# Patient Record
Sex: MALE | Race: BLACK OR AFRICAN AMERICAN | NOT HISPANIC OR LATINO
[De-identification: names, ages, dates, MRNs, and addresses within clinical notes are randomized per-mention and may not be internally consistent; named-entity substitution may affect disease eponyms.]

---

## 2017-04-06 ENCOUNTER — APPOINTMENT (OUTPATIENT)
Dept: ELECTROPHYSIOLOGY | Facility: CLINIC | Age: 47
End: 2017-04-06

## 2017-04-06 VITALS
WEIGHT: 315 LBS | DIASTOLIC BLOOD PRESSURE: 109 MMHG | HEIGHT: 67 IN | BODY MASS INDEX: 49.44 KG/M2 | HEART RATE: 62 BPM | SYSTOLIC BLOOD PRESSURE: 146 MMHG

## 2017-04-06 DIAGNOSIS — G45.9 TRANSIENT CEREBRAL ISCHEMIC ATTACK, UNSPECIFIED: ICD-10-CM

## 2017-04-06 DIAGNOSIS — I10 ESSENTIAL (PRIMARY) HYPERTENSION: ICD-10-CM

## 2017-04-06 DIAGNOSIS — G47.33 OBSTRUCTIVE SLEEP APNEA (ADULT) (PEDIATRIC): ICD-10-CM

## 2017-07-07 ENCOUNTER — APPOINTMENT (OUTPATIENT)
Dept: ELECTROPHYSIOLOGY | Facility: CLINIC | Age: 47
End: 2017-07-07

## 2017-07-07 VITALS
HEIGHT: 67 IN | BODY MASS INDEX: 49.44 KG/M2 | WEIGHT: 315 LBS | SYSTOLIC BLOOD PRESSURE: 153 MMHG | DIASTOLIC BLOOD PRESSURE: 112 MMHG | HEART RATE: 66 BPM

## 2017-07-07 DIAGNOSIS — Z86.79 PERSONAL HISTORY OF OTHER DISEASES OF THE CIRCULATORY SYSTEM: ICD-10-CM

## 2017-11-03 ENCOUNTER — APPOINTMENT (OUTPATIENT)
Dept: ELECTROPHYSIOLOGY | Facility: CLINIC | Age: 47
End: 2017-11-03
Payer: MEDICARE

## 2017-11-03 PROCEDURE — 93283 PRGRMG EVAL IMPLANTABLE DFB: CPT

## 2018-02-15 ENCOUNTER — APPOINTMENT (OUTPATIENT)
Dept: ELECTROPHYSIOLOGY | Facility: CLINIC | Age: 48
End: 2018-02-15
Payer: MEDICARE

## 2018-02-15 VITALS
BODY MASS INDEX: 49.44 KG/M2 | HEART RATE: 64 BPM | WEIGHT: 315 LBS | SYSTOLIC BLOOD PRESSURE: 112 MMHG | HEIGHT: 67 IN | DIASTOLIC BLOOD PRESSURE: 82 MMHG | OXYGEN SATURATION: 99 %

## 2018-02-15 PROCEDURE — 93283 PRGRMG EVAL IMPLANTABLE DFB: CPT

## 2018-05-22 ENCOUNTER — APPOINTMENT (OUTPATIENT)
Dept: ELECTROPHYSIOLOGY | Facility: CLINIC | Age: 48
End: 2018-05-22
Payer: MEDICARE

## 2018-05-22 PROCEDURE — 93283 PRGRMG EVAL IMPLANTABLE DFB: CPT

## 2018-07-05 ENCOUNTER — APPOINTMENT (OUTPATIENT)
Dept: ELECTROPHYSIOLOGY | Facility: CLINIC | Age: 48
End: 2018-07-05
Payer: MEDICARE

## 2018-07-05 VITALS
HEIGHT: 67 IN | HEART RATE: 70 BPM | BODY MASS INDEX: 49.44 KG/M2 | DIASTOLIC BLOOD PRESSURE: 85 MMHG | WEIGHT: 315 LBS | SYSTOLIC BLOOD PRESSURE: 125 MMHG

## 2018-07-05 PROCEDURE — 93283 PRGRMG EVAL IMPLANTABLE DFB: CPT

## 2018-07-05 PROCEDURE — 93290 INTERROG DEV EVAL ICPMS IP: CPT | Mod: 26

## 2018-07-05 RX ORDER — LOSARTAN POTASSIUM 100 MG/1
100 TABLET, FILM COATED ORAL
Refills: 0 | Status: DISCONTINUED | COMMUNITY
End: 2018-07-05

## 2018-08-16 ENCOUNTER — APPOINTMENT (OUTPATIENT)
Dept: ELECTROPHYSIOLOGY | Facility: CLINIC | Age: 48
End: 2018-08-16
Payer: MEDICARE

## 2018-08-16 VITALS
WEIGHT: 315 LBS | SYSTOLIC BLOOD PRESSURE: 124 MMHG | DIASTOLIC BLOOD PRESSURE: 78 MMHG | HEIGHT: 67 IN | BODY MASS INDEX: 49.44 KG/M2 | HEART RATE: 73 BPM

## 2018-08-16 PROCEDURE — 93290 INTERROG DEV EVAL ICPMS IP: CPT | Mod: 26

## 2018-08-16 PROCEDURE — 93283 PRGRMG EVAL IMPLANTABLE DFB: CPT

## 2018-08-20 PROBLEM — G45.9 TRANSIENT ISCHEMIC ATTACK: Status: ACTIVE | Noted: 2017-04-06

## 2018-09-27 ENCOUNTER — APPOINTMENT (OUTPATIENT)
Dept: ELECTROPHYSIOLOGY | Facility: CLINIC | Age: 48
End: 2018-09-27
Payer: MEDICARE

## 2018-09-27 VITALS — DIASTOLIC BLOOD PRESSURE: 101 MMHG | HEIGHT: 67 IN | HEART RATE: 63 BPM | SYSTOLIC BLOOD PRESSURE: 136 MMHG

## 2018-09-27 PROCEDURE — 93283 PRGRMG EVAL IMPLANTABLE DFB: CPT

## 2018-10-31 ENCOUNTER — APPOINTMENT (OUTPATIENT)
Dept: ELECTROPHYSIOLOGY | Facility: CLINIC | Age: 48
End: 2018-10-31
Payer: MEDICARE

## 2018-10-31 VITALS
WEIGHT: 315 LBS | BODY MASS INDEX: 49.44 KG/M2 | DIASTOLIC BLOOD PRESSURE: 83 MMHG | HEIGHT: 67 IN | SYSTOLIC BLOOD PRESSURE: 123 MMHG | HEART RATE: 76 BPM

## 2018-10-31 PROCEDURE — 93283 PRGRMG EVAL IMPLANTABLE DFB: CPT

## 2018-11-19 ENCOUNTER — OUTPATIENT (OUTPATIENT)
Dept: OUTPATIENT SERVICES | Facility: HOSPITAL | Age: 48
LOS: 1 days | Discharge: HOME | End: 2018-11-19
Payer: MEDICARE

## 2018-11-19 VITALS
SYSTOLIC BLOOD PRESSURE: 125 MMHG | TEMPERATURE: 98 F | HEART RATE: 57 BPM | DIASTOLIC BLOOD PRESSURE: 86 MMHG | OXYGEN SATURATION: 96 % | HEIGHT: 67 IN | WEIGHT: 315 LBS | RESPIRATION RATE: 18 BRPM

## 2018-11-19 DIAGNOSIS — Z98.890 OTHER SPECIFIED POSTPROCEDURAL STATES: Chronic | ICD-10-CM

## 2018-11-19 DIAGNOSIS — Z01.818 ENCOUNTER FOR OTHER PREPROCEDURAL EXAMINATION: ICD-10-CM

## 2018-11-19 DIAGNOSIS — I47.2 VENTRICULAR TACHYCARDIA: ICD-10-CM

## 2018-11-19 DIAGNOSIS — Z95.810 PRESENCE OF AUTOMATIC (IMPLANTABLE) CARDIAC DEFIBRILLATOR: Chronic | ICD-10-CM

## 2018-11-19 LAB
ANION GAP SERPL CALC-SCNC: 9 MMOL/L — SIGNIFICANT CHANGE UP (ref 5–17)
BASOPHILS # BLD AUTO: 0.04 K/UL — SIGNIFICANT CHANGE UP (ref 0–0.2)
BASOPHILS NFR BLD AUTO: 0.5 % — SIGNIFICANT CHANGE UP (ref 0–2)
BLD GP AB SCN SERPL QL: SIGNIFICANT CHANGE UP
BUN SERPL-MCNC: 16 MG/DL — SIGNIFICANT CHANGE UP (ref 7–23)
CALCIUM SERPL-MCNC: 8.4 MG/DL — LOW (ref 8.5–10.1)
CHLORIDE SERPL-SCNC: 106 MMOL/L — SIGNIFICANT CHANGE UP (ref 96–108)
CO2 SERPL-SCNC: 25 MMOL/L — SIGNIFICANT CHANGE UP (ref 22–31)
CREAT SERPL-MCNC: 1.09 MG/DL — SIGNIFICANT CHANGE UP (ref 0.5–1.3)
EOSINOPHIL # BLD AUTO: 0.13 K/UL — SIGNIFICANT CHANGE UP (ref 0–0.5)
EOSINOPHIL NFR BLD AUTO: 1.5 % — SIGNIFICANT CHANGE UP (ref 0–6)
GLUCOSE SERPL-MCNC: 148 MG/DL — HIGH (ref 70–99)
HCT VFR BLD CALC: 44.6 % — SIGNIFICANT CHANGE UP (ref 39–50)
HGB BLD-MCNC: 14.6 G/DL — SIGNIFICANT CHANGE UP (ref 13–17)
IMM GRANULOCYTES NFR BLD AUTO: 0.4 % — SIGNIFICANT CHANGE UP (ref 0–1.5)
LYMPHOCYTES # BLD AUTO: 3.46 K/UL — HIGH (ref 1–3.3)
LYMPHOCYTES # BLD AUTO: 40.4 % — SIGNIFICANT CHANGE UP (ref 13–44)
MCHC RBC-ENTMCNC: 27.9 PG — SIGNIFICANT CHANGE UP (ref 27–34)
MCHC RBC-ENTMCNC: 32.7 GM/DL — SIGNIFICANT CHANGE UP (ref 32–36)
MCV RBC AUTO: 85.1 FL — SIGNIFICANT CHANGE UP (ref 80–100)
MONOCYTES # BLD AUTO: 0.76 K/UL — SIGNIFICANT CHANGE UP (ref 0–0.9)
MONOCYTES NFR BLD AUTO: 8.9 % — SIGNIFICANT CHANGE UP (ref 2–14)
MRSA PCR RESULT.: SIGNIFICANT CHANGE UP
NEUTROPHILS # BLD AUTO: 4.14 K/UL — SIGNIFICANT CHANGE UP (ref 1.8–7.4)
NEUTROPHILS NFR BLD AUTO: 48.3 % — SIGNIFICANT CHANGE UP (ref 43–77)
NRBC # BLD: 0 /100 WBCS — SIGNIFICANT CHANGE UP (ref 0–0)
PLATELET # BLD AUTO: 247 K/UL — SIGNIFICANT CHANGE UP (ref 150–400)
POTASSIUM SERPL-MCNC: 3.3 MMOL/L — LOW (ref 3.5–5.3)
POTASSIUM SERPL-SCNC: 3.3 MMOL/L — LOW (ref 3.5–5.3)
RBC # BLD: 5.24 M/UL — SIGNIFICANT CHANGE UP (ref 4.2–5.8)
RBC # FLD: 14.9 % — HIGH (ref 10.3–14.5)
S AUREUS DNA NOSE QL NAA+PROBE: SIGNIFICANT CHANGE UP
SODIUM SERPL-SCNC: 140 MMOL/L — SIGNIFICANT CHANGE UP (ref 135–145)
TYPE + AB SCN PNL BLD: SIGNIFICANT CHANGE UP
WBC # BLD: 8.56 K/UL — SIGNIFICANT CHANGE UP (ref 3.8–10.5)
WBC # FLD AUTO: 8.56 K/UL — SIGNIFICANT CHANGE UP (ref 3.8–10.5)

## 2018-11-19 PROCEDURE — 93010 ELECTROCARDIOGRAM REPORT: CPT

## 2018-11-19 PROCEDURE — 71046 X-RAY EXAM CHEST 2 VIEWS: CPT | Mod: 26

## 2018-11-19 NOTE — H&P PST ADULT - PMH
AICD (automatic cardioverter/defibrillator) present    ASD (atrial septal defect)  history of . repaired as a child  Chronic systolic congestive heart failure    Hyperlipidemia, unspecified hyperlipidemia type    Hypertension, unspecified type    Morbid obesity    Obstructive sleep apnea syndrome  No sleep device  Paroxysmal atrial fibrillation    Type 2 diabetes mellitus without complication, without long-term current use of insulin    Ventricular tachycardia

## 2018-11-19 NOTE — H&P PST ADULT - ASSESSMENT
48 years old male present to PST prior to ICD generator change with Dr. Connors.  Plan   - Instructions as per procedural doctor and cardiac nurse practitioner.  - Mupirocin as directed.  - Chlorhexidine as directed.

## 2018-11-19 NOTE — H&P PST ADULT - TEACHING/LEARNING LEARNING PREFERENCES
video/written material/pictorial/skill demonstration/audio/computer/internet/group instruction/individual instruction/verbal instruction

## 2018-11-19 NOTE — H&P PST ADULT - HISTORY OF PRESENT ILLNESS
48 years old male with a history of ventricular tachycardia. He had a defibrillator placed to left anterior chest wall 5/2011. Device interrogated every 3 months. Recently interrogated every 6 weeks as "battery life ending." Devices alarms daily at 9:50 am. Planned ICD generator change.

## 2018-11-19 NOTE — H&P PST ADULT - PSH
AICD (automatic cardioverter/defibrillator) present  5/2011  H/O heart surgery  ASD repair at 3 years old

## 2018-11-21 NOTE — ASU PATIENT PROFILE, ADULT - MEDICATIONS TO HOLD
As per NP instructions, pt holding xarelto for 2 days prior to procedure and holding furosemide a.m. of procedure. As per NP instructions, pt holding xarelto for 2 days prior to procedure and holding furosemide and metformin in a.m. of procedure.

## 2018-11-27 ENCOUNTER — OUTPATIENT (OUTPATIENT)
Dept: OUTPATIENT SERVICES | Facility: HOSPITAL | Age: 48
LOS: 1 days | Discharge: HOME | End: 2018-11-27
Payer: MEDICARE

## 2018-11-27 VITALS
RESPIRATION RATE: 20 BRPM | HEART RATE: 70 BPM | DIASTOLIC BLOOD PRESSURE: 68 MMHG | SYSTOLIC BLOOD PRESSURE: 118 MMHG | OXYGEN SATURATION: 100 %

## 2018-11-27 VITALS
WEIGHT: 315 LBS | RESPIRATION RATE: 20 BRPM | SYSTOLIC BLOOD PRESSURE: 118 MMHG | DIASTOLIC BLOOD PRESSURE: 89 MMHG | OXYGEN SATURATION: 100 % | HEIGHT: 67 IN | HEART RATE: 68 BPM | TEMPERATURE: 97 F

## 2018-11-27 DIAGNOSIS — Z98.890 OTHER SPECIFIED POSTPROCEDURAL STATES: Chronic | ICD-10-CM

## 2018-11-27 DIAGNOSIS — Z95.810 PRESENCE OF AUTOMATIC (IMPLANTABLE) CARDIAC DEFIBRILLATOR: Chronic | ICD-10-CM

## 2018-11-27 PROBLEM — G47.33 OBSTRUCTIVE SLEEP APNEA (ADULT) (PEDIATRIC): Chronic | Status: ACTIVE | Noted: 2018-11-19

## 2018-11-27 PROBLEM — Q21.1 ATRIAL SEPTAL DEFECT: Chronic | Status: ACTIVE | Noted: 2018-11-19

## 2018-11-27 PROBLEM — E66.01 MORBID (SEVERE) OBESITY DUE TO EXCESS CALORIES: Chronic | Status: ACTIVE | Noted: 2018-11-19

## 2018-11-27 PROBLEM — I47.2 VENTRICULAR TACHYCARDIA: Chronic | Status: ACTIVE | Noted: 2018-11-19

## 2018-11-27 PROBLEM — I10 ESSENTIAL (PRIMARY) HYPERTENSION: Chronic | Status: ACTIVE | Noted: 2018-11-19

## 2018-11-27 PROBLEM — I48.0 PAROXYSMAL ATRIAL FIBRILLATION: Chronic | Status: ACTIVE | Noted: 2018-11-19

## 2018-11-27 PROBLEM — E78.5 HYPERLIPIDEMIA, UNSPECIFIED: Chronic | Status: ACTIVE | Noted: 2018-11-19

## 2018-11-27 PROBLEM — E11.9 TYPE 2 DIABETES MELLITUS WITHOUT COMPLICATIONS: Chronic | Status: ACTIVE | Noted: 2018-11-19

## 2018-11-27 PROBLEM — I50.22 CHRONIC SYSTOLIC (CONGESTIVE) HEART FAILURE: Chronic | Status: ACTIVE | Noted: 2018-11-19

## 2018-11-27 LAB
ANION GAP SERPL CALC-SCNC: 7 MMOL/L — SIGNIFICANT CHANGE UP (ref 5–17)
BUN SERPL-MCNC: 15 MG/DL — SIGNIFICANT CHANGE UP (ref 7–23)
CALCIUM SERPL-MCNC: 8.3 MG/DL — LOW (ref 8.5–10.1)
CHLORIDE SERPL-SCNC: 108 MMOL/L — SIGNIFICANT CHANGE UP (ref 96–108)
CO2 SERPL-SCNC: 27 MMOL/L — SIGNIFICANT CHANGE UP (ref 22–31)
CREAT SERPL-MCNC: 1.13 MG/DL — SIGNIFICANT CHANGE UP (ref 0.5–1.3)
GLUCOSE SERPL-MCNC: 139 MG/DL — HIGH (ref 70–99)
POTASSIUM SERPL-MCNC: 3.3 MMOL/L — LOW (ref 3.5–5.3)
POTASSIUM SERPL-SCNC: 3.3 MMOL/L — LOW (ref 3.5–5.3)
SODIUM SERPL-SCNC: 142 MMOL/L — SIGNIFICANT CHANGE UP (ref 135–145)

## 2018-11-27 PROCEDURE — 33263 RMVL & RPLCMT DFB GEN 2 LEAD: CPT

## 2018-11-27 PROCEDURE — 93010 ELECTROCARDIOGRAM REPORT: CPT

## 2018-11-27 RX ORDER — METFORMIN HYDROCHLORIDE 500 MG/1
500 TABLET, COATED ORAL
Refills: 0 | Status: ACTIVE | COMMUNITY

## 2018-11-27 RX ORDER — CEPHALEXIN 500 MG
500 CAPSULE ORAL
Qty: 0 | Refills: 0 | Status: DISCONTINUED | OUTPATIENT
Start: 2018-11-27 | End: 2018-12-12

## 2018-11-27 RX ORDER — CEFAZOLIN SODIUM 1 G
2000 VIAL (EA) INJECTION ONCE
Qty: 0 | Refills: 0 | Status: COMPLETED | OUTPATIENT
Start: 2018-11-27 | End: 2018-11-27

## 2018-11-27 RX ORDER — SACUBITRIL AND VALSARTAN 49; 51 MG/1; MG/1
49-51 TABLET, FILM COATED ORAL
Refills: 0 | Status: ACTIVE | COMMUNITY

## 2018-11-27 RX ADMIN — Medication 100 MILLIGRAM(S): at 11:35

## 2018-11-27 NOTE — PACU DISCHARGE NOTE - COMMENTS
verbal and written discharge instructions given to pt with pt verbalizing understanding; home monitor given to pt with instructions given to pt and device paired with monitor by [employer] rep.

## 2018-11-28 ENCOUNTER — APPOINTMENT (OUTPATIENT)
Dept: ELECTROPHYSIOLOGY | Facility: CLINIC | Age: 48
End: 2018-11-28
Payer: MEDICARE

## 2018-11-28 VITALS — BODY MASS INDEX: 49.44 KG/M2 | WEIGHT: 315 LBS | HEART RATE: 71 BPM | HEIGHT: 67 IN

## 2018-11-28 VITALS — DIASTOLIC BLOOD PRESSURE: 75 MMHG | SYSTOLIC BLOOD PRESSURE: 122 MMHG

## 2018-11-28 PROCEDURE — 99024 POSTOP FOLLOW-UP VISIT: CPT

## 2018-12-01 DIAGNOSIS — I49.5 SICK SINUS SYNDROME: ICD-10-CM

## 2018-12-01 DIAGNOSIS — Z45.02 ENCOUNTER FOR ADJUSTMENT AND MANAGEMENT OF AUTOMATIC IMPLANTABLE CARDIAC DEFIBRILLATOR: ICD-10-CM

## 2018-12-01 DIAGNOSIS — I50.22 CHRONIC SYSTOLIC (CONGESTIVE) HEART FAILURE: ICD-10-CM

## 2018-12-01 DIAGNOSIS — I47.2 VENTRICULAR TACHYCARDIA: ICD-10-CM

## 2018-12-01 DIAGNOSIS — I11.0 HYPERTENSIVE HEART DISEASE WITH HEART FAILURE: ICD-10-CM

## 2018-12-01 DIAGNOSIS — E78.5 HYPERLIPIDEMIA, UNSPECIFIED: ICD-10-CM

## 2018-12-01 DIAGNOSIS — Z87.74 PERSONAL HISTORY OF (CORRECTED) CONGENITAL MALFORMATIONS OF HEART AND CIRCULATORY SYSTEM: ICD-10-CM

## 2018-12-01 DIAGNOSIS — Z79.01 LONG TERM (CURRENT) USE OF ANTICOAGULANTS: ICD-10-CM

## 2018-12-01 DIAGNOSIS — G47.33 OBSTRUCTIVE SLEEP APNEA (ADULT) (PEDIATRIC): ICD-10-CM

## 2018-12-01 DIAGNOSIS — E11.9 TYPE 2 DIABETES MELLITUS WITHOUT COMPLICATIONS: ICD-10-CM

## 2018-12-01 DIAGNOSIS — E66.01 MORBID (SEVERE) OBESITY DUE TO EXCESS CALORIES: ICD-10-CM

## 2018-12-01 DIAGNOSIS — Z79.84 LONG TERM (CURRENT) USE OF ORAL HYPOGLYCEMIC DRUGS: ICD-10-CM

## 2018-12-01 DIAGNOSIS — I48.0 PAROXYSMAL ATRIAL FIBRILLATION: ICD-10-CM

## 2018-12-10 ENCOUNTER — APPOINTMENT (OUTPATIENT)
Dept: ELECTROPHYSIOLOGY | Facility: CLINIC | Age: 48
End: 2018-12-10
Payer: MEDICARE

## 2018-12-10 VITALS
HEART RATE: 69 BPM | HEIGHT: 67 IN | DIASTOLIC BLOOD PRESSURE: 76 MMHG | SYSTOLIC BLOOD PRESSURE: 110 MMHG | BODY MASS INDEX: 49.44 KG/M2 | WEIGHT: 315 LBS

## 2018-12-10 PROCEDURE — 99024 POSTOP FOLLOW-UP VISIT: CPT

## 2019-01-11 ENCOUNTER — APPOINTMENT (OUTPATIENT)
Dept: ELECTROPHYSIOLOGY | Facility: CLINIC | Age: 49
End: 2019-01-11
Payer: MEDICARE

## 2019-01-11 PROCEDURE — 93283 PRGRMG EVAL IMPLANTABLE DFB: CPT

## 2019-01-11 PROCEDURE — 93290 INTERROG DEV EVAL ICPMS IP: CPT | Mod: 26

## 2019-03-12 ENCOUNTER — APPOINTMENT (OUTPATIENT)
Dept: ELECTROPHYSIOLOGY | Facility: CLINIC | Age: 49
End: 2019-03-12
Payer: MEDICARE

## 2019-03-12 VITALS
HEIGHT: 67 IN | WEIGHT: 315 LBS | SYSTOLIC BLOOD PRESSURE: 128 MMHG | DIASTOLIC BLOOD PRESSURE: 87 MMHG | BODY MASS INDEX: 49.44 KG/M2 | HEART RATE: 42 BPM

## 2019-03-12 PROCEDURE — 93290 INTERROG DEV EVAL ICPMS IP: CPT | Mod: 26

## 2019-03-12 PROCEDURE — 93283 PRGRMG EVAL IMPLANTABLE DFB: CPT

## 2019-03-27 ENCOUNTER — INPATIENT (INPATIENT)
Facility: HOSPITAL | Age: 49
LOS: 1 days | Discharge: HOME | End: 2019-03-29
Attending: INTERNAL MEDICINE | Admitting: INTERNAL MEDICINE
Payer: MEDICARE

## 2019-03-27 VITALS — OXYGEN SATURATION: 87 % | HEART RATE: 66 BPM | WEIGHT: 315 LBS | HEIGHT: 67 IN | RESPIRATION RATE: 20 BRPM

## 2019-03-27 DIAGNOSIS — Z98.890 OTHER SPECIFIED POSTPROCEDURAL STATES: Chronic | ICD-10-CM

## 2019-03-27 DIAGNOSIS — Z95.810 PRESENCE OF AUTOMATIC (IMPLANTABLE) CARDIAC DEFIBRILLATOR: Chronic | ICD-10-CM

## 2019-03-27 LAB
ALBUMIN SERPL ELPH-MCNC: 3.5 G/DL — SIGNIFICANT CHANGE UP (ref 3.3–5)
ALP SERPL-CCNC: 49 U/L — SIGNIFICANT CHANGE UP (ref 40–120)
ALT FLD-CCNC: 47 U/L — SIGNIFICANT CHANGE UP (ref 12–78)
ANION GAP SERPL CALC-SCNC: 7 MMOL/L — SIGNIFICANT CHANGE UP (ref 5–17)
APPEARANCE UR: CLEAR — SIGNIFICANT CHANGE UP
APTT BLD: 44.2 SEC — HIGH (ref 27.5–36.3)
AST SERPL-CCNC: 22 U/L — SIGNIFICANT CHANGE UP (ref 15–37)
BACTERIA # UR AUTO: NEGATIVE — SIGNIFICANT CHANGE UP
BASOPHILS # BLD AUTO: 0.04 K/UL — SIGNIFICANT CHANGE UP (ref 0–0.2)
BASOPHILS NFR BLD AUTO: 0.3 % — SIGNIFICANT CHANGE UP (ref 0–2)
BILIRUB SERPL-MCNC: 0.9 MG/DL — SIGNIFICANT CHANGE UP (ref 0.2–1.2)
BILIRUB UR-MCNC: NEGATIVE — SIGNIFICANT CHANGE UP
BUN SERPL-MCNC: 17 MG/DL — SIGNIFICANT CHANGE UP (ref 7–23)
CALCIUM SERPL-MCNC: 8.2 MG/DL — LOW (ref 8.5–10.1)
CHLORIDE SERPL-SCNC: 106 MMOL/L — SIGNIFICANT CHANGE UP (ref 96–108)
CO2 SERPL-SCNC: 24 MMOL/L — SIGNIFICANT CHANGE UP (ref 22–31)
COLOR SPEC: YELLOW — SIGNIFICANT CHANGE UP
CREAT SERPL-MCNC: 1.2 MG/DL — SIGNIFICANT CHANGE UP (ref 0.5–1.3)
DIFF PNL FLD: ABNORMAL
EOSINOPHIL # BLD AUTO: 0.06 K/UL — SIGNIFICANT CHANGE UP (ref 0–0.5)
EOSINOPHIL NFR BLD AUTO: 0.5 % — SIGNIFICANT CHANGE UP (ref 0–6)
EPI CELLS # UR: NEGATIVE — SIGNIFICANT CHANGE UP
GLUCOSE BLDC GLUCOMTR-MCNC: 124 MG/DL — HIGH (ref 70–99)
GLUCOSE BLDC GLUCOMTR-MCNC: 155 MG/DL — HIGH (ref 70–99)
GLUCOSE BLDC GLUCOMTR-MCNC: 162 MG/DL — HIGH (ref 70–99)
GLUCOSE SERPL-MCNC: 153 MG/DL — HIGH (ref 70–99)
GLUCOSE UR QL: 50 MG/DL
HCT VFR BLD CALC: 41.4 % — SIGNIFICANT CHANGE UP (ref 39–50)
HGB BLD-MCNC: 13.2 G/DL — SIGNIFICANT CHANGE UP (ref 13–17)
IMM GRANULOCYTES NFR BLD AUTO: 0.3 % — SIGNIFICANT CHANGE UP (ref 0–1.5)
INR BLD: 2.39 RATIO — HIGH (ref 0.88–1.16)
KETONES UR-MCNC: NEGATIVE — SIGNIFICANT CHANGE UP
LEUKOCYTE ESTERASE UR-ACNC: ABNORMAL
LYMPHOCYTES # BLD AUTO: 2.91 K/UL — SIGNIFICANT CHANGE UP (ref 1–3.3)
LYMPHOCYTES # BLD AUTO: 24.4 % — SIGNIFICANT CHANGE UP (ref 13–44)
MAGNESIUM SERPL-MCNC: 1.8 MG/DL — SIGNIFICANT CHANGE UP (ref 1.6–2.6)
MCHC RBC-ENTMCNC: 27.6 PG — SIGNIFICANT CHANGE UP (ref 27–34)
MCHC RBC-ENTMCNC: 31.9 GM/DL — LOW (ref 32–36)
MCV RBC AUTO: 86.6 FL — SIGNIFICANT CHANGE UP (ref 80–100)
MONOCYTES # BLD AUTO: 1.12 K/UL — HIGH (ref 0–0.9)
MONOCYTES NFR BLD AUTO: 9.4 % — SIGNIFICANT CHANGE UP (ref 2–14)
NEUTROPHILS # BLD AUTO: 7.75 K/UL — HIGH (ref 1.8–7.4)
NEUTROPHILS NFR BLD AUTO: 65.1 % — SIGNIFICANT CHANGE UP (ref 43–77)
NITRITE UR-MCNC: NEGATIVE — SIGNIFICANT CHANGE UP
NRBC # BLD: 0 /100 WBCS — SIGNIFICANT CHANGE UP (ref 0–0)
NT-PROBNP SERPL-SCNC: 3787 PG/ML — HIGH (ref 0–125)
PH UR: 6 — SIGNIFICANT CHANGE UP (ref 5–8)
PLATELET # BLD AUTO: 249 K/UL — SIGNIFICANT CHANGE UP (ref 150–400)
POTASSIUM SERPL-MCNC: 3.5 MMOL/L — SIGNIFICANT CHANGE UP (ref 3.5–5.3)
POTASSIUM SERPL-SCNC: 3.5 MMOL/L — SIGNIFICANT CHANGE UP (ref 3.5–5.3)
PROT SERPL-MCNC: 7.8 GM/DL — SIGNIFICANT CHANGE UP (ref 6–8.3)
PROT UR-MCNC: 100 MG/DL
PROTHROM AB SERPL-ACNC: 27.3 SEC — HIGH (ref 10–12.9)
RAPID RVP RESULT: SIGNIFICANT CHANGE UP
RBC # BLD: 4.78 M/UL — SIGNIFICANT CHANGE UP (ref 4.2–5.8)
RBC # FLD: 15 % — HIGH (ref 10.3–14.5)
RBC CASTS # UR COMP ASSIST: NEGATIVE /HPF — SIGNIFICANT CHANGE UP (ref 0–4)
SODIUM SERPL-SCNC: 137 MMOL/L — SIGNIFICANT CHANGE UP (ref 135–145)
SP GR SPEC: 1.01 — SIGNIFICANT CHANGE UP (ref 1.01–1.02)
TROPONIN I SERPL-MCNC: 0.56 NG/ML — HIGH (ref 0.01–0.04)
TROPONIN I SERPL-MCNC: 0.57 NG/ML — HIGH (ref 0.01–0.04)
UROBILINOGEN FLD QL: 1 MG/DL
WBC # BLD: 11.91 K/UL — HIGH (ref 3.8–10.5)
WBC # FLD AUTO: 11.91 K/UL — HIGH (ref 3.8–10.5)
WBC UR QL: NEGATIVE — SIGNIFICANT CHANGE UP

## 2019-03-27 PROCEDURE — 71045 X-RAY EXAM CHEST 1 VIEW: CPT | Mod: 26

## 2019-03-27 PROCEDURE — 93010 ELECTROCARDIOGRAM REPORT: CPT

## 2019-03-27 PROCEDURE — 99285 EMERGENCY DEPT VISIT HI MDM: CPT | Mod: 25

## 2019-03-27 RX ORDER — DOCUSATE SODIUM 100 MG
100 CAPSULE ORAL THREE TIMES A DAY
Qty: 0 | Refills: 0 | Status: DISCONTINUED | OUTPATIENT
Start: 2019-03-27 | End: 2019-03-29

## 2019-03-27 RX ORDER — INSULIN LISPRO 100/ML
VIAL (ML) SUBCUTANEOUS
Qty: 0 | Refills: 0 | Status: DISCONTINUED | OUTPATIENT
Start: 2019-03-27 | End: 2019-03-29

## 2019-03-27 RX ORDER — IPRATROPIUM/ALBUTEROL SULFATE 18-103MCG
3 AEROSOL WITH ADAPTER (GRAM) INHALATION ONCE
Qty: 0 | Refills: 0 | Status: COMPLETED | OUTPATIENT
Start: 2019-03-27 | End: 2019-03-27

## 2019-03-27 RX ORDER — ONDANSETRON 8 MG/1
4 TABLET, FILM COATED ORAL EVERY 6 HOURS
Qty: 0 | Refills: 0 | Status: DISCONTINUED | OUTPATIENT
Start: 2019-03-27 | End: 2019-03-29

## 2019-03-27 RX ORDER — METOPROLOL TARTRATE 50 MG
50 TABLET ORAL
Qty: 0 | Refills: 0 | Status: DISCONTINUED | OUTPATIENT
Start: 2019-03-27 | End: 2019-03-29

## 2019-03-27 RX ORDER — DEXTROSE 50 % IN WATER 50 %
12.5 SYRINGE (ML) INTRAVENOUS ONCE
Qty: 0 | Refills: 0 | Status: DISCONTINUED | OUTPATIENT
Start: 2019-03-27 | End: 2019-03-29

## 2019-03-27 RX ORDER — SACUBITRIL AND VALSARTAN 24; 26 MG/1; MG/1
1 TABLET, FILM COATED ORAL
Qty: 0 | Refills: 0 | Status: DISCONTINUED | OUTPATIENT
Start: 2019-03-27 | End: 2019-03-29

## 2019-03-27 RX ORDER — ASPIRIN/CALCIUM CARB/MAGNESIUM 324 MG
81 TABLET ORAL DAILY
Qty: 0 | Refills: 0 | Status: DISCONTINUED | OUTPATIENT
Start: 2019-03-28 | End: 2019-03-28

## 2019-03-27 RX ORDER — ASPIRIN/CALCIUM CARB/MAGNESIUM 324 MG
325 TABLET ORAL ONCE
Qty: 0 | Refills: 0 | Status: COMPLETED | OUTPATIENT
Start: 2019-03-27 | End: 2019-03-27

## 2019-03-27 RX ORDER — ACETAMINOPHEN 500 MG
650 TABLET ORAL EVERY 6 HOURS
Qty: 0 | Refills: 0 | Status: DISCONTINUED | OUTPATIENT
Start: 2019-03-27 | End: 2019-03-29

## 2019-03-27 RX ORDER — RIVAROXABAN 15 MG-20MG
20 KIT ORAL EVERY 24 HOURS
Qty: 0 | Refills: 0 | Status: DISCONTINUED | OUTPATIENT
Start: 2019-03-27 | End: 2019-03-29

## 2019-03-27 RX ORDER — AMLODIPINE BESYLATE 2.5 MG/1
5 TABLET ORAL DAILY
Qty: 0 | Refills: 0 | Status: DISCONTINUED | OUTPATIENT
Start: 2019-03-27 | End: 2019-03-29

## 2019-03-27 RX ORDER — FUROSEMIDE 40 MG
40 TABLET ORAL
Qty: 0 | Refills: 0 | Status: DISCONTINUED | OUTPATIENT
Start: 2019-03-27 | End: 2019-03-29

## 2019-03-27 RX ORDER — INSULIN LISPRO 100/ML
VIAL (ML) SUBCUTANEOUS AT BEDTIME
Qty: 0 | Refills: 0 | Status: DISCONTINUED | OUTPATIENT
Start: 2019-03-27 | End: 2019-03-29

## 2019-03-27 RX ORDER — SPIRONOLACTONE 25 MG/1
25 TABLET, FILM COATED ORAL DAILY
Qty: 0 | Refills: 0 | Status: DISCONTINUED | OUTPATIENT
Start: 2019-03-27 | End: 2019-03-29

## 2019-03-27 RX ORDER — DEXTROSE 50 % IN WATER 50 %
25 SYRINGE (ML) INTRAVENOUS ONCE
Qty: 0 | Refills: 0 | Status: DISCONTINUED | OUTPATIENT
Start: 2019-03-27 | End: 2019-03-29

## 2019-03-27 RX ORDER — GLUCAGON INJECTION, SOLUTION 0.5 MG/.1ML
1 INJECTION, SOLUTION SUBCUTANEOUS ONCE
Qty: 0 | Refills: 0 | Status: DISCONTINUED | OUTPATIENT
Start: 2019-03-27 | End: 2019-03-29

## 2019-03-27 RX ORDER — SODIUM CHLORIDE 9 MG/ML
1000 INJECTION, SOLUTION INTRAVENOUS
Qty: 0 | Refills: 0 | Status: DISCONTINUED | OUTPATIENT
Start: 2019-03-27 | End: 2019-03-29

## 2019-03-27 RX ORDER — ATORVASTATIN CALCIUM 80 MG/1
10 TABLET, FILM COATED ORAL AT BEDTIME
Qty: 0 | Refills: 0 | Status: DISCONTINUED | OUTPATIENT
Start: 2019-03-27 | End: 2019-03-29

## 2019-03-27 RX ORDER — DEXTROSE 50 % IN WATER 50 %
15 SYRINGE (ML) INTRAVENOUS ONCE
Qty: 0 | Refills: 0 | Status: DISCONTINUED | OUTPATIENT
Start: 2019-03-27 | End: 2019-03-29

## 2019-03-27 RX ORDER — FUROSEMIDE 40 MG
40 TABLET ORAL ONCE
Qty: 0 | Refills: 0 | Status: COMPLETED | OUTPATIENT
Start: 2019-03-27 | End: 2019-03-27

## 2019-03-27 RX ADMIN — Medication 100 MILLIGRAM(S): at 21:19

## 2019-03-27 RX ADMIN — Medication 3 MILLILITER(S): at 04:00

## 2019-03-27 RX ADMIN — Medication 650 MILLIGRAM(S): at 08:45

## 2019-03-27 RX ADMIN — RIVAROXABAN 20 MILLIGRAM(S): KIT at 18:24

## 2019-03-27 RX ADMIN — Medication 650 MILLIGRAM(S): at 11:46

## 2019-03-27 RX ADMIN — Medication 40 MILLIGRAM(S): at 04:35

## 2019-03-27 RX ADMIN — Medication 40 MILLIGRAM(S): at 18:24

## 2019-03-27 RX ADMIN — Medication 325 MILLIGRAM(S): at 04:39

## 2019-03-27 RX ADMIN — SACUBITRIL AND VALSARTAN 1 TABLET(S): 24; 26 TABLET, FILM COATED ORAL at 19:57

## 2019-03-27 RX ADMIN — Medication 100 MILLIGRAM(S): at 15:32

## 2019-03-27 RX ADMIN — ATORVASTATIN CALCIUM 10 MILLIGRAM(S): 80 TABLET, FILM COATED ORAL at 21:19

## 2019-03-27 RX ADMIN — Medication 50 MILLIGRAM(S): at 18:24

## 2019-03-27 RX ADMIN — Medication 2: at 11:45

## 2019-03-27 NOTE — H&P ADULT - NSICDXFAMILYHX_GEN_ALL_CORE_FT
FAMILY HISTORY:  Father  Still living? No  Family history of heart disease, Age at diagnosis: Age Unknown

## 2019-03-27 NOTE — H&P ADULT - HISTORY OF PRESENT ILLNESS
47yo/M with PMH chr systolic CHF s/p AICD, HTN, hyperlipidemia, Afib on xarelto, morbid obesity/TRIP presented for evaluation of worsening SOB for the last few days. Patient states that now he has SOB even at rest. No chest pain. Has been having non-productive cough and fever in ED.

## 2019-03-27 NOTE — ED PROVIDER NOTE - OBJECTIVE STATEMENT
49 yo male with h/o HTN, HLD, PAF, DM, AICD c/o sob.  Patient c/o cough and mildly increasing sob x 1 week with significantly worsening SOB in the past 2 days, much worse tonight.  Patient denies chest pain, back pain or fever.  No URI symptoms.  +compliant with medications.  No recent travel.  nonsmoker, nondrinker.  PMD Edgard Sanchez Dr.

## 2019-03-27 NOTE — ED ADULT NURSE REASSESSMENT NOTE - NS ED NURSE REASSESS COMMENT FT1
pt received at 0700. pt aaox3, denies pain or discomfort. in NAD. pt voided 600cc clear urine.  plan of care reviewed with pt and wife at bedside. all agreeable to plan at this time.  all questions answered to pt satisfaction. all needs addressed. all additional needs at this time.  fall/harm risk identified and explanied, and fall prevention implemented. bed low and locked.   pt given call bell and instructed to call for assistance with needs. pt agreeable and teaches back.   will continue hourly rounding.

## 2019-03-27 NOTE — H&P ADULT - NSHPLABSRESULTS_GEN_ALL_CORE
13.2   11.91 )-----------( 249      ( 27 Mar 2019 03:08 )             41.4     27 Mar 2019 03:08    137    |  106    |  17     ----------------------------<  153    3.5     |  24     |  1.20     Ca    8.2        27 Mar 2019 03:08  Mg     1.8       27 Mar 2019 03:08    TPro  7.8    /  Alb  3.5    /  TBili  0.9    /  DBili  x      /  AST  22     /  ALT  47     /  AlkPhos  49     27 Mar 2019 03:08    LIVER FUNCTIONS - ( 27 Mar 2019 03:08 )  Alb: 3.5 g/dL / Pro: 7.8 gm/dL / ALK PHOS: 49 U/L / ALT: 47 U/L / AST: 22 U/L / GGT: x           PT/INR - ( 27 Mar 2019 03:08 )   PT: 27.3 sec;   INR: 2.39 ratio       PTT - ( 27 Mar 2019 03:08 )  PTT:44.2 sec    CARDIAC MARKERS ( 27 Mar 2019 07:01 )  0.555 ng/mL / x     / x     / x     / x      CARDIAC MARKERS ( 27 Mar 2019 03:08 )  0.571 ng/mL / x     / x     / x     / x        Urinalysis Basic - ( 27 Mar 2019 04:30 )  Color: Yellow / Appearance: Clear / S.015 / pH: x  Gluc: x / Ketone: Negative  / Bili: Negative / Urobili: 1 mg/dL   Blood: x / Protein: 100 mg/dL / Nitrite: Negative   Leuk Esterase: Trace / RBC: Negative /HPF / WBC Negative   Sq Epi: x / Non Sq Epi: Negative / Bacteria: Negative

## 2019-03-27 NOTE — ED ADULT NURSE NOTE - CHIEF COMPLAINT QUOTE
pt c/o difficulty breathing, cough for past couple of days, pain in chest when coughing. 02 87%, HR 66

## 2019-03-27 NOTE — H&P ADULT - ASSESSMENT
#Acute on chronic systolic CHF  #Borderline trops likely 2 to demand ischemia  Admit to tele  Cardio eval DR Huerta  Aspirin  IV Lasix, monitor lytes while on diuresis  ECHO if not done recently in the office    #Fever, cough likely 2 to viral illness  No evidence of pneumonia on CXR  RVP stat  Will hold off antibiotics for now    #Afib chronic on xarelto  Cont AC by xarelto    #Diabetes- BGM    #HTN/hyperlipidemia- cont home meds    #Dispo- inpatient admit. Full code. D/w pt

## 2019-03-27 NOTE — ED ADULT NURSE NOTE - NSIMPLEMENTINTERV_GEN_ALL_ED
Implemented All Universal Safety Interventions:  [place] to call system. Call bell, personal items and telephone within reach. Instruct patient to call for assistance. Room bathroom lighting operational. Non-slip footwear when patient is off stretcher. Physically safe environment: no spills, clutter or unnecessary equipment. Stretcher in lowest position, wheels locked, appropriate side rails in place.

## 2019-03-27 NOTE — CONSULT NOTE ADULT - SUBJECTIVE AND OBJECTIVE BOX
Patient is a 48y old  Male who presents with a chief complaint of Worsening SOB, cough, fever      ________________________________  MCarla ROWELL is a 48y year old Male with a past medical history of nonischemic cardiomyopathy, chronic systolic heart,, status post coronary angiogram several years ago, history of ICD implantation, with recent generator change, hypertension, history of paroxysmal atrial flutter and atrial fibrillation on anticoagulation, diabetes, morbid obesity and hyperlipidemia.    Patient presents for evaluation of worsening shortness of breath, even at rest. He denies any chest pain. No palpitations. No dizziness or lightheadedness. Denies any syncope. No ICD discharge.    He does admit to a minimally productive cough. No fevers.  ________________________________  Review of systems: A 10 point review of system has been performed, and is negative except for what has been mentioned in the above history of present illness.     PAST MEDICAL & SURGICAL HISTORY:  Chronic systolic heart failure  History of congestive heart failure and his family  AICD   Nonischemic cardiomyopathy  ASD (atrial septal defect): spontaneously closes a child  Type 2 diabetes mellitus without complication, without long-term current use of insulin  Morbid obesity  Obstructive sleep apnea syndrome  Paroxysmal atrial fibrillation/flutter on anticoagulation  Ventricular tachycardia  Hyperlipidemia  Hypertension    FAMILY HISTORY:  Family history of heart failure (Father)   The patient denies any history of premature CAD or sudden cardiac death.    SOCIAL HISTORY: The patient denies any history of tobacco abuse, alcohol abuse or illicit drug use.    MEDICATIONS  (STANDING):  amLODIPine   Tablet 5 milliGRAM(s) Oral daily  atorvastatin 10 milliGRAM(s) Oral at bedtime  dextrose 5%. 1000 milliLiter(s) (50 mL/Hr) IV Continuous <Continuous>  dextrose 50% Injectable 12.5 Gram(s) IV Push once  dextrose 50% Injectable 25 Gram(s) IV Push once  dextrose 50% Injectable 25 Gram(s) IV Push once  docusate sodium 100 milliGRAM(s) Oral three times a day  furosemide   Injectable 40 milliGRAM(s) IV Push two times a day  insulin lispro (HumaLOG) corrective regimen sliding scale   SubCutaneous three times a day before meals  insulin lispro (HumaLOG) corrective regimen sliding scale   SubCutaneous at bedtime  metoprolol tartrate 50 milliGRAM(s) Oral two times a day  rivaroxaban 20 milliGRAM(s) Oral every 24 hours  sacubitril 49 mG/valsartan 51 mG 1 Tablet(s) Oral two times a day  spironolactone 25 milliGRAM(s) Oral daily    MEDICATIONS  (PRN):  acetaminophen   Tablet .. 650 milliGRAM(s) Oral every 6 hours PRN Temp greater or equal to 38C (100.4F), Mild Pain (1 - 3)  aluminum hydroxide/magnesium hydroxide/simethicone Suspension 30 milliLiter(s) Oral every 4 hours PRN Dyspepsia  dextrose 40% Gel 15 Gram(s) Oral once PRN Blood Glucose LESS THAN 70 milliGRAM(s)/deciliter  glucagon  Injectable 1 milliGRAM(s) IntraMuscular once PRN Glucose LESS THAN 70 milligrams/deciliter  ondansetron Injectable 4 milliGRAM(s) IV Push every 6 hours PRN Nausea      Vital Signs Last 24 Hrs  T(C): 37.1 (27 Mar 2019 11:44), Max: 38.1 (27 Mar 2019 07:26)  T(F): 98.8 (27 Mar 2019 11:44), Max: 100.6 (27 Mar 2019 07:26)  HR: 71 (27 Mar 2019 11:44) (66 - 84)  BP: 101/48 (27 Mar 2019 11:44) (101/48 - 144/72)  BP(mean): --  RR: 16 (27 Mar 2019 11:44) (16 - 22)  SpO2: 98% (27 Mar 2019 11:44) (87% - 100%)  I&O's Summary    26 Mar 2019 07:01  -  27 Mar 2019 07:00  --------------------------------------------------------  IN: 0 mL / OUT: 400 mL / NET: -400 mL      ________________________________  PHYSICAL EXAM:  GENERAL APPEARANCE:  No acute distress, obese  HEAD: normocephalic, atraumatic  NECK: supple, no jugular venous distention, no carotid bruit but obese  HEART: regular rate and rhythm, S1, S2 normal, no significant murmur  CHEST:  No anterior chest wall tenderness  LUNGS:  crackles  ABDOMEN soft, nontender, nondistended, with positive bowel sounds appreciated  EXTREMITIES: no clubbing, cyanosis, with mild edema.   NEURO:  Alert and oriented x3  PSYC:  Normal affect  SKIN:  Dry     Vital Signs Last 24 Hrs  T(C): 37.1 (27 Mar 2019 11:44), Max: 38.1 (27 Mar 2019 07:26)  T(F): 98.8 (27 Mar 2019 11:44), Max: 100.6 (27 Mar 2019 07:26)  HR: 71 (27 Mar 2019 11:44) (66 - 84)  BP: 101/48 (27 Mar 2019 11:44) (101/48 - 144/72)  BP(mean): --  RR: 16 (27 Mar 2019 11:44) (16 - 22)  SpO2: 98% (27 Mar 2019 11:44) (87% - 100%)  I&O's Summary    26 Mar 2019 07:01  -  27 Mar 2019 07:00  --------------------------------------------------------  IN: 0 mL / OUT: 400 mL / NET: -400 mL      ________________________________  TELEMETRY: Aflutter/Fib    ECG: A flutter with demand V paced and LBBB    LABS:                        13.2   11.91 )-----------( 249      ( 27 Mar 2019 03:08 )             41.4             -    137  |  106  |  17  ----------------------------<  153<H>  3.5   |  24  |  1.20    Ca    8.2<L>      27 Mar 2019 03:08  Mg     1.8         TPro  7.8  /  Alb  3.5  /  TBili  0.9  /  DBili  x   /  AST  22  /  ALT  47  /  AlkPhos  49   @ 07:01  Trop-I  0.555  CK      --  CK-MB   --     @ 03:08  Trop-I  0.571  CK      --  CK-MB   --    Pro BNP  3787  @ 03:08  D Dimer  --  @ 03:08    PT/INR - ( 27 Mar 2019 03:08 )   PT: 27.3 sec;   INR: 2.39 ratio         PTT - ( 27 Mar 2019 03:08 )  PTT:44.2 sec  Urinalysis Basic - ( 27 Mar 2019 04:30 )    Color: Yellow / Appearance: Clear / S.015 / pH: x  Gluc: x / Ketone: Negative  / Bili: Negative / Urobili: 1 mg/dL   Blood: x / Protein: 100 mg/dL / Nitrite: Negative   Leuk Esterase: Trace / RBC: Negative /HPF / WBC Negative   Sq Epi: x / Non Sq Epi: Negative / Bacteria: Negative        ________________________________    RADIOLOGY & ADDITIONAL STUDIES: ICD, with pulmonary vascular congestion and cardiomegaly  ________________________________

## 2019-03-27 NOTE — H&P ADULT - NSICDXPASTSURGICALHX_GEN_ALL_CORE_FT
PAST SURGICAL HISTORY:  AICD (automatic cardioverter/defibrillator) present 5/2011    H/O heart surgery ASD repair at 3 years old

## 2019-03-27 NOTE — ED PROVIDER NOTE - CONSTITUTIONAL, MLM
normal... Well appearing, obese male, awake, alert, oriented to person, place, time/situation and in no apparent distress.

## 2019-03-27 NOTE — PATIENT PROFILE ADULT - RESOURCE/ENVIRONMENTAL CONCERNS
NEPHROLOGY/RENAL follow up PROGRESS NOTE     Patient: Nohemi Nunes MRN # [id number]   : 1946 Attending: Rain Rosales MD   70 year old female Date of admission 2017    Chief complaint: ESRD on HD (TRS)  Subjective/Interval History: Laying in bed, no new complaints.  Denies CP, SOB, fever, chills.      Previous Paracentesis  -6.1L,  -3.2L    Review of Systems: As above  Reviewed: Allergies, Medical History and Surgical History & all data below with other service notes.     Vital Last Value 24Hour Range   Temperature 97.5 °F (36.4 °C) Temp  Min: 96.8 °F (36 °C)  Max: 98.2 °F (36.8 °C)   Pulse 60 Pulse  Min: 54  Max: 94   Respiratory 18 Resp  Min: 18  Max: 18   Blood Pressure 102/60 BP  Min: 96/45  Max: 150/75   ArtBP   No Data Recorded   Pulse Oximetry 98 % SpO2  Min: 98 %  Max: 100 %     Vital Today Admitted   Weight 63.3 kg Weight: 68 kg   Height N/A Height: 5' 3\" (160 cm)   BMI N/A BMI (Calculated): 26.63   Weight over the past 48 Hours:  Intake/Output: No intake/output data recorded.   I/O last 3 completed shifts:  In: 1565 [P.O.:1320; I.V.:45; IV Piggyback:200]  Out: 1000 [Other:1000]    Intake/Output Summary (Last 24 hours) at 17 0952  Last data filed at 17 0600   Gross per 24 hour   Intake             1565 ml   Output             1000 ml   Net              565 ml        Temp  Min: 96.8 °F (36 °C)  Max: 98.2 °F (36.8 °C)  Patient Vitals for the past 48 hrs:   Weight   17 0500 62.4 kg   17 1117 60 kg   17 0500 63.3 kg     Visit Vitals  /60 (BP Location: [facility] [place], Patient Position: [facility])   Pulse 60   Temp 97.5 °F (36.4 °C) (Oral)   Resp 18   Ht 5' 2\" (1.575 m)   Wt 63.3 kg   SpO2 98%   BMI 25.51 kg/m²     Medications/Infusions:   Scheduled  • aztreonam  1,000 mg Intravenous 2 times per day   • epoetin roxy  20,000 Units Subcutaneous Once per day on    • metoPROLOL  12.5 mg Oral BID   • lactulose  20 g Oral TID   • mirtazapine  7.5 mg Oral Nightly    • venlafaxine  37.5 mg Oral Daily   • sodium chloride (PF)  2 mL Injection 2 times per day   • aspirin  81 mg Oral Daily   • cholecalciferol  2,000 Units Oral Daily   • gabapentin  200 mg Oral 2 times per day   • pantoprazole  40 mg Oral QAM AC   • zinc sulfate  220 mg Oral Daily     Continuous Infusions:       Physical Exam:  Visit Vitals  /60 ([place] [facility] [place]: [facility]   Pulse 60   Temp 97.5 °F (36.4 °C) (Oral)   Resp 18   Ht 5' 2\" (1.575 m)   Wt 63.3 kg   SpO2 98%   BMI 25.51 kg/m²     GENERAL: NAD, at rest, working with therapy  HEENT: Mucosa moist  NECK: No JVD, R IJ PC-CDI  PULM: CTA, RA  CVS: RRR  GI: Abdomen soft, ND, NT  EXT: no lower extremity edema.   SKIN: Turgor good  NEURO: A & O x3, moves all 4 extremities   HD access:  Right chest dialysis catheter in situ, site clean    Laboratory Results:    Recent Labs  Lab 06/25/17  0636 06/24/17  1743 06/24/17  0700 06/23/17  0655 06/22/17  0551 06/21/17  0648 06/20/17  0744 06/19/17  1255 06/19/17  0615 06/18/17  1647   GLUCOSE 96 65 91 102* 83 80 78  --  101*  --    SODIUM 138 138 136 139 134* 138 137  --  139  --    POTASSIUM 4.2 3.4 4.4 4.7 4.3 4.2 3.3*  --  3.7 3.0*   CHLORIDE 100 101 100 101 99 101 100  --  102  --    CO2 27 24 23 28 22 27 22  --  22  --    BUN 18 11 27* 16 29* 19 30*  --  24*  --    CREATININE 3.09* 2.49* 4.13* 2.98* 4.44* 3.40* 4.86*  --  3.88*  --    GFRA 17 22 12 17 11 15 10  --  13  --    GFRNA 14 19 10 15 9 13 8  --  11  --    ANIONGAP 15 16 17 15 17 14 18  --  19  --    BILIRUBIN 1.3* 1.7* 1.5* 1.4* 1.3* 1.7* 2.0*  --  2.4*  --    AST 68* 65* 73* 89* 61* 57* 66*  --  41*  --    GPT 41 36 41 42 32 29 35  --  25  --    ALKPT 395* 359* 379* 382* 300* 290* 288*  --  233*  --    ALBUMIN 2.9* 3.1* 3.1* 3.4* 3.1* 3.0* 3.4*  --  3.5*  --    MG  --  1.8  --   --   --   --   --   --   --   --    CALCIUM 9.1 8.7 9.2 9.3 8.9 9.0 9.5  --  9.3  --    PHOS  --   --   --   --   --   --   --  3.8  --   --          Recent Labs  Lab 06/25/17  0636 06/24/17  1743 06/24/17  0700 06/23/17  0655 06/22/17  0551 06/21/17  0648 06/20/17  0744 06/19/17  1255 06/19/17  0615   CALCIUM 9.1 8.7 9.2 9.3 8.9 9.0 9.5  --  9.3   INTAC  --   --   --   --   --   --   --  305*  --        Recent Labs  Lab 06/25/17  0636 06/24/17  0700 06/23/17  0655 06/22/17  0551 06/21/17  0648 06/20/17  0744 06/19/17  0615   INR 1.2 1.2 1.3 1.3 1.3 1.3 1.4   WBC 15.8* 16.4* 15.4* 13.0* 13.2* 12.5* 12.9*   HGB 10.7* 10.3* 10.7* 10.2* 10.1* 10.4* 10.1*   HCT 34.6* 32.7* 34.4* 32.3* 31.4* 32.6* 33.5*   * 134* 126* 95* 95* 116* 108*     Microbiology:  Results for orders placed or performed during the hospital encounter of 06/12/17   Blood Culture   Result Value Ref Range    Specimen Description BLOOD, PERIPHERAL     CULTURE NO GROWTH 4 DAYS.     REPORT STATUS PENDING        Radiology imaging  IR Paracentesis   Final Result   IMPRESSION:   Uncomplicated ultrasound-guided paracentesis, resulting in 3200 milliliters   of fluid evacuation.      DISPOSITION: The patient was transferred to their hospital room in stable   condition.      I have reviewed the images and agree with the resident's interpretation.         XR CHEST PA AND LATERAL   Final Result   Impression:      Multiple minor vascular congestion with no focal consolidation.         CT Head Brain   Final Result   IMPRESSION:    No acute findings. Small vessel ischemic change.                  IR Paracentesis   Final Result   IMPRESSION:       Paracentesis under ultrasound guidance with removal of 6100 mL of abdominal   fluid for diagnostic and therapeutic purposes.       I have reviewed the images and agree with the Resident's interpretation. I   have personally provided oversight/supervision of the resident during the   key components of the above procedure as appropriate and agree with the   Resident's dictation.         CT Head Brain   Final Result   IMPRESSION:     1.  No acute intracranial  abnormalities.   2.  Generalized volume loss and probable chronic microvascular ischemic   changes.        I have reviewed the images and agree with the resident's interpretation.         XR Chest AP or PA   Final Result   IMPRESSION:        Stable mild pulmonary vascular congestion and probable left basilar   atelectasis.      I have reviewed the images and agree with the Resident's interpretation.           Ejection Fraction   Date Value Ref Range Status   05/25/2017 60.0 % Final     Diagnosis/Plan:  # ESRD on HD (TRS), missed HD on 6/10  PTA EDW 73k - needs to be lowered upon d/c     # Volume Status   Near eu volemic on assessment     # H/o Hypertension with intermittent hypotension  Well controlled, on meds as above.      #HypoKalemia (r/t lactulose induced diarrhea); h/o hyperkalemia    Potassium level appears to be stable, likely manage potassium with diet and dialysis     # Anemia, of ESRD  On EPO PTA. Continue as Inpatient.  Hgb stable. Iron replete as of 6/12.    #Secondary Hyperparathyroidism  iPTH 305 and phos 3.8 on 6/19, Calcium and corrected calcium within normal limits  Continue cholecalciferol daily    Recs:  Next HD per [facility] schedule    Dr. Moore to resume care in AM    HARI Wallace  6/25/2017  Nephrology    APNP available by pager 8:00am to 4:00pm at [phone]  Otherwise please call answering service [phone]     none

## 2019-03-27 NOTE — H&P ADULT - NSHPPHYSICALEXAM_GEN_ALL_CORE
Vital Signs Last 24 Hrs  T(C): 38.1 (27 Mar 2019 07:26), Max: 38.1 (27 Mar 2019 07:26)  T(F): 100.6 (27 Mar 2019 07:26), Max: 100.6 (27 Mar 2019 07:26)  HR: 77 (27 Mar 2019 07:26) (66 - 84)  BP: 115/69 (27 Mar 2019 07:26) (115/69 - 144/72)  BP(mean): --  RR: 18 (27 Mar 2019 07:26) (18 - 22)  SpO2: 100% (27 Mar 2019 07:26) (87% - 100%)

## 2019-03-27 NOTE — H&P ADULT - NSICDXPASTMEDICALHX_GEN_ALL_CORE_FT
PAST MEDICAL HISTORY:  AICD (automatic cardioverter/defibrillator) present     ASD (atrial septal defect) history of . repaired as a child    Chronic systolic congestive heart failure     Hyperlipidemia, unspecified hyperlipidemia type     Hypertension, unspecified type     Morbid obesity     Obstructive sleep apnea syndrome No sleep device    Paroxysmal atrial fibrillation     Type 2 diabetes mellitus without complication, without long-term current use of insulin     Ventricular tachycardia

## 2019-03-27 NOTE — CONSULT NOTE ADULT - ASSESSMENT
ASSESSMENT:  Chronic systolic heart failure with acute exacerbation  AICD   Nonischemic cardiomyopathy  ASD (atrial septal defect): spontaneously closes a child  Type 2 diabetes mellitus without complication, without long-term current use of insulin  Morbid obesity  History of congestive heart failure in his family  HTN  HLD  TRIP    PLAN:  Recommended continuation of IV diuretics. Follow-up a.m. chemistries. Add spironolactone.  Continue with Entresto  Continue with beta blocker, may switch to carvedilol as an outpatient  Continue with anticoagulation. Given risk of increased bleeding and no established history of CAD, discontinue aspirin.  DVT and GI prophylaxis  Discuss her primary care physician   Plan of care discussed with patient    __________________________________________________________________________  Thank you for allowing me to participate in the care of your patient. Please contact me should any questions arise.    MELVA Nascimento DO, FACC  [phone]

## 2019-03-27 NOTE — ED ADULT NURSE REASSESSMENT NOTE - NS ED NURSE REASSESS COMMENT FT1
pt received from previous RN. pt sleeping comfortably. vss. no s/s of acute distress noted. pending admission orders. will continue to monitor

## 2019-03-28 LAB
ADD ON TEST-SPECIMEN IN LAB: SIGNIFICANT CHANGE UP
ANION GAP SERPL CALC-SCNC: 6 MMOL/L — SIGNIFICANT CHANGE UP (ref 5–17)
BUN SERPL-MCNC: 21 MG/DL — SIGNIFICANT CHANGE UP (ref 7–23)
CALCIUM SERPL-MCNC: 8.1 MG/DL — LOW (ref 8.5–10.1)
CHLORIDE SERPL-SCNC: 109 MMOL/L — HIGH (ref 96–108)
CO2 SERPL-SCNC: 29 MMOL/L — SIGNIFICANT CHANGE UP (ref 22–31)
CREAT SERPL-MCNC: 1.16 MG/DL — SIGNIFICANT CHANGE UP (ref 0.5–1.3)
GLUCOSE BLDC GLUCOMTR-MCNC: 115 MG/DL — HIGH (ref 70–99)
GLUCOSE BLDC GLUCOMTR-MCNC: 126 MG/DL — HIGH (ref 70–99)
GLUCOSE BLDC GLUCOMTR-MCNC: 130 MG/DL — HIGH (ref 70–99)
GLUCOSE BLDC GLUCOMTR-MCNC: 139 MG/DL — HIGH (ref 70–99)
GLUCOSE SERPL-MCNC: 115 MG/DL — HIGH (ref 70–99)
HBA1C BLD-MCNC: 7 % — HIGH (ref 4–5.6)
HCT VFR BLD CALC: 38.7 % — LOW (ref 39–50)
HGB BLD-MCNC: 12.6 G/DL — LOW (ref 13–17)
MCHC RBC-ENTMCNC: 28.1 PG — SIGNIFICANT CHANGE UP (ref 27–34)
MCHC RBC-ENTMCNC: 32.6 GM/DL — SIGNIFICANT CHANGE UP (ref 32–36)
MCV RBC AUTO: 86.2 FL — SIGNIFICANT CHANGE UP (ref 80–100)
NRBC # BLD: 0 /100 WBCS — SIGNIFICANT CHANGE UP (ref 0–0)
NT-PROBNP SERPL-SCNC: 2248 PG/ML — HIGH (ref 0–125)
PLATELET # BLD AUTO: 211 K/UL — SIGNIFICANT CHANGE UP (ref 150–400)
POTASSIUM SERPL-MCNC: 3.7 MMOL/L — SIGNIFICANT CHANGE UP (ref 3.5–5.3)
POTASSIUM SERPL-SCNC: 3.7 MMOL/L — SIGNIFICANT CHANGE UP (ref 3.5–5.3)
RBC # BLD: 4.49 M/UL — SIGNIFICANT CHANGE UP (ref 4.2–5.8)
RBC # FLD: 15.2 % — HIGH (ref 10.3–14.5)
SODIUM SERPL-SCNC: 144 MMOL/L — SIGNIFICANT CHANGE UP (ref 135–145)
WBC # BLD: 9.55 K/UL — SIGNIFICANT CHANGE UP (ref 3.8–10.5)
WBC # FLD AUTO: 9.55 K/UL — SIGNIFICANT CHANGE UP (ref 3.8–10.5)

## 2019-03-28 RX ADMIN — Medication 50 MILLIGRAM(S): at 17:51

## 2019-03-28 RX ADMIN — SACUBITRIL AND VALSARTAN 1 TABLET(S): 24; 26 TABLET, FILM COATED ORAL at 05:06

## 2019-03-28 RX ADMIN — Medication 100 MILLIGRAM(S): at 05:48

## 2019-03-28 RX ADMIN — SPIRONOLACTONE 25 MILLIGRAM(S): 25 TABLET, FILM COATED ORAL at 05:07

## 2019-03-28 RX ADMIN — Medication 40 MILLIGRAM(S): at 05:07

## 2019-03-28 RX ADMIN — Medication 40 MILLIGRAM(S): at 17:51

## 2019-03-28 RX ADMIN — Medication 50 MILLIGRAM(S): at 05:07

## 2019-03-28 RX ADMIN — RIVAROXABAN 20 MILLIGRAM(S): KIT at 17:51

## 2019-03-28 RX ADMIN — Medication 100 MILLIGRAM(S): at 17:50

## 2019-03-28 RX ADMIN — SACUBITRIL AND VALSARTAN 1 TABLET(S): 24; 26 TABLET, FILM COATED ORAL at 17:51

## 2019-03-28 RX ADMIN — AMLODIPINE BESYLATE 5 MILLIGRAM(S): 2.5 TABLET ORAL at 05:06

## 2019-03-28 RX ADMIN — ATORVASTATIN CALCIUM 10 MILLIGRAM(S): 80 TABLET, FILM COATED ORAL at 21:24

## 2019-03-28 NOTE — PROGRESS NOTE ADULT - ASSESSMENT
ASSESSMENT:  Chronic systolic heart failure with acute exacerbation  AICD   Nonischemic cardiomyopathy  ASD (atrial septal defect): spontaneously closes a child  Type II MI  Type 2 diabetes mellitus without complication, without long-term current use of insulin  Morbid obesity  History of congestive heart failure in his family  HTN  HLD  TRIP    PLAN:  Recommended continuation of IV diuretics and spironolactone.  Continue with Entresto  Continue with beta blocker   Continue with anticoagulation.   Poss DC in AM on oral lasix  DVT and GI prophylaxis  Out pt sleep study  Plan of care discussed with patient    __________________________________________________________________________  Thank you for allowing me to participate in the care of your patient. Please contact me should any questions arise.    MELVA Nascimento DO, FACC

## 2019-03-28 NOTE — PROGRESS NOTE ADULT - SUBJECTIVE AND OBJECTIVE BOX
History of Present Illness:   47yo/M with PMH chr systolic CHF s/p AICD, HTN, hyperlipidemia, Afib on xarelto, morbid obesity/TRIP presented for evaluation of worsening SOB for the last few days. Patient states that now he has SOB even at rest. No chest pain. Has been having non-productive cough and fever in ED.  -improved with IV Lasix    3.28: still has residual coughing         REVIEW OF SYSTEMS:    CONSTITUTIONAL: No weakness, No fevers or chills, obese  ENT: No ear ache, No sorethroat  NECK: No pain, No stiffness  RESPIRATORY: +cough, No wheezing, No hemoptysis; No dyspnea  CARDIOVASCULAR: No chest pain, No palpitations  GASTROINTESTINAL: No abd pain, No nausea, No vomiting, No hematemesis, No diarrhea or constipation. No melena, No hematochezia.  GENITOURINARY: No dysuria, No  hematuria  NEUROLOGICAL: No diplopia, No paresthesia, No motor dysfunction  MUSCULOSKELETAL: No arthralgia, No myalgia  SKIN: No rashes, or lesions   PSYCH: no anxiety, no suicidal ideation    All other review of systems is negative unless indicated above    Vital Signs Last 24 Hrs  T(C): 36.4 (29 Mar 2019 10:47), Max: 36.8 (28 Mar 2019 17:01)  T(F): 97.6 (29 Mar 2019 10:47), Max: 98.3 (28 Mar 2019 17:01)  HR: 69 (29 Mar 2019 10:47) (67 - 71)  BP: 96/73 (29 Mar 2019 10:47) (96/73 - 113/74)  RR: 18 (29 Mar 2019 10:47) (18 - 18)  SpO2: 94% (29 Mar 2019 10:47) (94% - 96%)    PHYSICAL EXAM:    GENERAL: NAD, Well nourished  HEENT:  NC/AT, EOMI, PERRLA, No scleral icterus, Moist mucous membranes  NECK: Supple, No JVD  CNS:  Alert & Oriented X3, Motor Strength 5/5 B/L upper and lower extremities; DTRs 2+ intact   LUNG: Normal Breath sounds, Clear to auscultation bilaterally, No rales, No rhonchi, No wheezing  HEART: RRR; No murmurs, No rubs  ABDOMEN: +BS, ST/ND/NT  GENITOURINARY: Voiding, Bladder not distended  EXTREMITIES:  2+ Peripheral Pulses, No clubbing, No cyanosis, No tibial edema  MUSCULOSKELTAL: Joints normal ROM, No TTP, No effusion  SKIN: no rashes  RECTAL: deferred, not indicated  BREAST: deferred                   Assessment and Plan:     1. Acute respiratory failure:     2. Acute on chronic systolic CHF  resolving, cw IV Lasix x 24hrs     3. Borderline trops likely 2 to demand ischemia    4. Fever, cough likely 2 to viral illness  No evidence of pneumonia on CXR  mucinex     5. Afib chronic on xarelto  Cont AC by xarelto    6. DM type II:  cw oral hypoglycemiants

## 2019-03-28 NOTE — PROGRESS NOTE ADULT - SUBJECTIVE AND OBJECTIVE BOX
The patient was seen and examined. No acute events overnight.  No chest pain.  SOB improved  No palpitations.    Review of systems: A 10 point review of system has been performed, and is negative except for what has been mentioned in the above history of present illness.     PAST MEDICAL & SURGICAL HISTORY:  Chronic systolic heart failure  History of congestive heart failure and his family  AICD   Nonischemic cardiomyopathy  ASD (atrial septal defect): spontaneously closes a child  Type 2 diabetes mellitus without complication, without long-term current use of insulin  Morbid obesity  Obstructive sleep apnea syndrome  Paroxysmal atrial fibrillation/flutter on anticoagulation  Ventricular tachycardia  Hyperlipidemia  Hypertension    FAMILY HISTORY:  Family history of heart failure (Father)   The patient denies any history of premature CAD or sudden cardiac death.    SOCIAL HISTORY: The patient denies any history of tobacco abuse, alcohol abuse or illicit drug use.  MEDICATIONS  (STANDING):  amLODIPine   Tablet 5 milliGRAM(s) Oral daily  atorvastatin 10 milliGRAM(s) Oral at bedtime  dextrose 5%. 1000 milliLiter(s) (50 mL/Hr) IV Continuous <Continuous>  dextrose 50% Injectable 12.5 Gram(s) IV Push once  dextrose 50% Injectable 25 Gram(s) IV Push once  dextrose 50% Injectable 25 Gram(s) IV Push once  docusate sodium 100 milliGRAM(s) Oral three times a day  furosemide   Injectable 40 milliGRAM(s) IV Push two times a day  insulin lispro (HumaLOG) corrective regimen sliding scale   SubCutaneous three times a day before meals  insulin lispro (HumaLOG) corrective regimen sliding scale   SubCutaneous at bedtime  metoprolol tartrate 50 milliGRAM(s) Oral two times a day  rivaroxaban 20 milliGRAM(s) Oral every 24 hours  sacubitril 49 mG/valsartan 51 mG 1 Tablet(s) Oral two times a day  spironolactone 25 milliGRAM(s) Oral daily    MEDICATIONS  (PRN):  acetaminophen   Tablet .. 650 milliGRAM(s) Oral every 6 hours PRN Temp greater or equal to 38C (100.4F), Mild Pain (1 - 3)  aluminum hydroxide/magnesium hydroxide/simethicone Suspension 30 milliLiter(s) Oral every 4 hours PRN Dyspepsia  dextrose 40% Gel 15 Gram(s) Oral once PRN Blood Glucose LESS THAN 70 milliGRAM(s)/deciliter  glucagon  Injectable 1 milliGRAM(s) IntraMuscular once PRN Glucose LESS THAN 70 milligrams/deciliter  guaiFENesin   Syrup  (Sugar-Free) 100 milliGRAM(s) Oral every 6 hours PRN Cough  ondansetron Injectable 4 milliGRAM(s) IV Push every 6 hours PRN Nausea      Vital Signs Last 24 Hrs  T(C): 37.1 (28 Mar 2019 04:14), Max: 37.1 (27 Mar 2019 11:44)  T(F): 98.8 (28 Mar 2019 04:14), Max: 98.8 (27 Mar 2019 11:44)  HR: 67 (28 Mar 2019 04:14) (67 - 71)  BP: 120/70 (28 Mar 2019 04:14) (101/48 - 120/70)  BP(mean): --  RR: 18 (28 Mar 2019 04:14) (16 - 18)  SpO2: 94% (28 Mar 2019 04:14) (94% - 98%)  I&O's Summary    27 Mar 2019 07:01  -  28 Mar 2019 07:00  --------------------------------------------------------  IN: 0 mL / OUT: 800 mL / NET: -800 mL     ________________________________  PHYSICAL EXAM:  GENERAL APPEARANCE:  No acute distress, obese  HEAD: normocephalic, atraumatic  NECK: supple, no jugular venous distention, no carotid bruit but obese  HEART: regular rate and rhythm, S1, S2 normal, no significant murmur  CHEST:  No anterior chest wall tenderness  LUNGS:  crackles  ABDOMEN soft, nontender, nondistended, with positive bowel sounds appreciated  EXTREMITIES: no clubbing, cyanosis, with mild edema.   NEURO:  Alert and oriented x3  PSYC:  Normal affect  SKIN:  Dry     Vital Signs Last 24 Hrs  T(C): 37.1 (27 Mar 2019 11:44), Max: 38.1 (27 Mar 2019 07:26)  T(F): 98.8 (27 Mar 2019 11:44), Max: 100.6 (27 Mar 2019 07:26)  HR: 71 (27 Mar 2019 11:44) (66 - 84)  BP: 101/48 (27 Mar 2019 11:44) (101/48 - 144/72)  BP(mean): --  RR: 16 (27 Mar 2019 11:44) (16 - 22)  SpO2: 98% (27 Mar 2019 11:44) (87% - 100%)  I&O's Summary    26 Mar 2019 07:01  -  27 Mar 2019 07:00  --------------------------------------------------------  IN: 0 mL / OUT: 400 mL / NET: -400 mL      ________________________________  TELEMETRY: Aflutter/Fib    ECG: A flutter with demand V paced and LBBB    LABS:                                     12.6   9.55  )-----------( 211      ( 28 Mar 2019 05:19 )             38.7          03    144  |  109<H>  |  21  ----------------------------<  115<H>  3.7   |  29  |  1.16    Ca    8.1<L>      28 Mar 2019 05:19  Mg     1.8         TPro  7.8  /  Alb  3.5  /  TBili  0.9  /  DBili  x   /  AST  22  /  ALT  47  /  AlkPhos  49      CARDIAC MARKERS ( 27 Mar 2019 07:01 )  0.555 ng/mL / x     / x     / x     / x      CARDIAC MARKERS ( 27 Mar 2019 03:08 )  0.571 ng/mL / x     / x     / x     / x          PT/INR - ( 27 Mar 2019 03:08 )   PT: 27.3 sec;   INR: 2.39 ratio         PTT - ( 27 Mar 2019 03:08 )  PTT:44.2 sec  Urinalysis Basic - ( 27 Mar 2019 04:30 )    Color: Yellow / Appearance: Clear / S.015 / pH: x  Gluc: x / Ketone: Negative  / Bili: Negative / Urobili: 1 mg/dL   Blood: x / Protein: 100 mg/dL / Nitrite: Negative   Leuk Esterase: Trace / RBC: Negative /HPF / WBC Negative   Sq Epi: x / Non Sq Epi: Negative / Bacteria: Negative

## 2019-03-29 ENCOUNTER — TRANSCRIPTION ENCOUNTER (OUTPATIENT)
Age: 49
End: 2019-03-29

## 2019-03-29 VITALS
DIASTOLIC BLOOD PRESSURE: 73 MMHG | TEMPERATURE: 98 F | SYSTOLIC BLOOD PRESSURE: 96 MMHG | HEART RATE: 69 BPM | RESPIRATION RATE: 18 BRPM | OXYGEN SATURATION: 94 %

## 2019-03-29 LAB
GLUCOSE BLDC GLUCOMTR-MCNC: 128 MG/DL — HIGH (ref 70–99)
GLUCOSE BLDC GLUCOMTR-MCNC: 139 MG/DL — HIGH (ref 70–99)

## 2019-03-29 RX ORDER — SPIRONOLACTONE 25 MG/1
1 TABLET, FILM COATED ORAL
Qty: 30 | Refills: 0
Start: 2019-03-29

## 2019-03-29 RX ORDER — SPIRONOLACTONE 25 MG/1
1 TABLET, FILM COATED ORAL
Qty: 30 | Refills: 0 | DISCHARGE
Start: 2019-03-29

## 2019-03-29 RX ORDER — FUROSEMIDE 40 MG
40 TABLET ORAL DAILY
Qty: 0 | Refills: 0 | Status: DISCONTINUED | OUTPATIENT
Start: 2019-03-29 | End: 2019-03-29

## 2019-03-29 RX ADMIN — Medication 40 MILLIGRAM(S): at 05:19

## 2019-03-29 RX ADMIN — AMLODIPINE BESYLATE 5 MILLIGRAM(S): 2.5 TABLET ORAL at 05:19

## 2019-03-29 RX ADMIN — SPIRONOLACTONE 25 MILLIGRAM(S): 25 TABLET, FILM COATED ORAL at 05:19

## 2019-03-29 RX ADMIN — SACUBITRIL AND VALSARTAN 1 TABLET(S): 24; 26 TABLET, FILM COATED ORAL at 05:19

## 2019-03-29 RX ADMIN — Medication 50 MILLIGRAM(S): at 05:19

## 2019-03-29 NOTE — PROGRESS NOTE ADULT - SUBJECTIVE AND OBJECTIVE BOX
The patient was seen and examined. No acute events overnight.  No chest pain.  SOB improved - seen ambulating  No palpitations.    Review of systems: A 10 point review of system has been performed, and is negative except for what has been mentioned in the above history of present illness.     PAST MEDICAL & SURGICAL HISTORY:  Chronic systolic heart failure  History of congestive heart failure and his family  AICD   Nonischemic cardiomyopathy  ASD (atrial septal defect) s/p surg as a child  Type 2 diabetes mellitus without complication, without long-term current use of insulin  Morbid obesity  Obstructive sleep apnea syndrome  Paroxysmal atrial fibrillation/flutter on anticoagulation  Ventricular tachycardia  Hyperlipidemia  Hypertension    FAMILY HISTORY:  Family history of heart failure (Father)   The patient denies any history of premature CAD or sudden cardiac death.    SOCIAL HISTORY: The patient denies any history of tobacco abuse, alcohol abuse or illicit drug use.      MEDICATIONS  (STANDING):  amLODIPine   Tablet 5 milliGRAM(s) Oral daily  atorvastatin 10 milliGRAM(s) Oral at bedtime  dextrose 5%. 1000 milliLiter(s) (50 mL/Hr) IV Continuous <Continuous>  dextrose 50% Injectable 12.5 Gram(s) IV Push once  dextrose 50% Injectable 25 Gram(s) IV Push once  dextrose 50% Injectable 25 Gram(s) IV Push once  docusate sodium 100 milliGRAM(s) Oral three times a day  furosemide   Injectable 40 milliGRAM(s) IV Push two times a day  insulin lispro (HumaLOG) corrective regimen sliding scale   SubCutaneous three times a day before meals  insulin lispro (HumaLOG) corrective regimen sliding scale   SubCutaneous at bedtime  metoprolol tartrate 50 milliGRAM(s) Oral two times a day  rivaroxaban 20 milliGRAM(s) Oral every 24 hours  sacubitril 49 mG/valsartan 51 mG 1 Tablet(s) Oral two times a day  spironolactone 25 milliGRAM(s) Oral daily    MEDICATIONS  (PRN):  acetaminophen   Tablet .. 650 milliGRAM(s) Oral every 6 hours PRN Temp greater or equal to 38C (100.4F), Mild Pain (1 - 3)  aluminum hydroxide/magnesium hydroxide/simethicone Suspension 30 milliLiter(s) Oral every 4 hours PRN Dyspepsia  dextrose 40% Gel 15 Gram(s) Oral once PRN Blood Glucose LESS THAN 70 milliGRAM(s)/deciliter  glucagon  Injectable 1 milliGRAM(s) IntraMuscular once PRN Glucose LESS THAN 70 milligrams/deciliter  guaiFENesin   Syrup  (Sugar-Free) 100 milliGRAM(s) Oral every 6 hours PRN Cough  ondansetron Injectable 4 milliGRAM(s) IV Push every 6 hours PRN Nausea      Vital Signs Last 24 Hrs  T(C): 36.4 (29 Mar 2019 10:47), Max: 37 (28 Mar 2019 11:00)  T(F): 97.6 (29 Mar 2019 10:47), Max: 98.6 (28 Mar 2019 11:00)  HR: 69 (29 Mar 2019 10:47) (67 - 71)  BP: 96/73 (29 Mar 2019 10:47) (96/73 - 113/74)  BP(mean): --  RR: 18 (29 Mar 2019 10:47) (18 - 18)  SpO2: 94% (29 Mar 2019 10:47) (94% - 96%)  I&O's Summary    ________________________________  PHYSICAL EXAM:  GENERAL APPEARANCE:  No acute distress, obese  HEAD: normocephalic, atraumatic  NECK: supple, no jugular venous distention, no carotid bruit but obese  HEART: regular rate and rhythm, S1, S2 normal, no significant murmur  CHEST:  ant chest wall scar  LUNGS:  crackles  ABDOMEN soft, nontender, nondistended, with positive bowel sounds appreciated  EXTREMITIES: no clubbing, cyanosis, with mild edema.   NEURO:  Alert and oriented x3  PSYC:  Normal affect  SKIN:  Dry     Vital Signs Last 24 Hrs  T(C): 37.1 (27 Mar 2019 11:44), Max: 38.1 (27 Mar 2019 07:26)  T(F): 98.8 (27 Mar 2019 11:44), Max: 100.6 (27 Mar 2019 07:26)  HR: 71 (27 Mar 2019 11:44) (66 - 84)  BP: 101/48 (27 Mar 2019 11:44) (101/48 - 144/72)  BP(mean): --  RR: 16 (27 Mar 2019 11:44) (16 - 22)  SpO2: 98% (27 Mar 2019 11:44) (87% - 100%)  I&O's Summary    26 Mar 2019 07:01  -  27 Mar 2019 07:00  --------------------------------------------------------  IN: 0 mL / OUT: 400 mL / NET: -400 mL      ________________________________  TELEMETRY: Aflutter/Fib    ECG: A flutter with demand V paced and LBBB    LABS:                             12.6   9.55  )-----------( 211      ( 28 Mar 2019 05:19 )             38.7          03-28    144  |  109<H>  |  21  ----------------------------<  115<H>  3.7   |  29  |  1.16    Ca    8.1<L>      28 Mar 2019 05:19

## 2019-03-29 NOTE — DISCHARGE NOTE PROVIDER - NSDCCPCAREPLAN_GEN_ALL_CORE_FT
PRINCIPAL DISCHARGE DIAGNOSIS  Diagnosis: CHF (congestive heart failure)  Assessment and Plan of Treatment: take Lasix and Aldactone; low salt diet

## 2019-03-29 NOTE — DISCHARGE NOTE NURSING/CASE MANAGEMENT/SOCIAL WORK - NSDCDPATPORTLINK_GEN_ALL_CORE
You can access the [facility], offered by [facility], by registering with the following website: [email or web address]

## 2019-03-29 NOTE — CDI QUERY NOTE - NSCDIOTHERTXTBX_GEN_ALL_CORE_HH
Patient admitted with Worsening SOB  Patient states that now he has SOB even at rest.     Noted to be in acute on chronic systolic CHF    In ER o2 sat= 87% room air  O2 2 liters applied- O2 sat 100%  Now O2 sats are 94-96%% on room air    Are the above clinical indicators indicative of a diagnosis  a) Acute hypoxic respiratory failure , now resolved  b) Acute on chronic hypoxic respiratory failure  c) Acute respiratory failure unspecified  d) Acute hypercapnic respiratory failure, now resolved  e) No clinical evidence of respiratory failure  f) Other, please clarify

## 2019-03-29 NOTE — DISCHARGE NOTE PROVIDER - HOSPITAL COURSE
History of Present Illness:     47yo/M with PMH chr systolic CHF s/p AICD, HTN, hyperlipidemia, Afib on xarelto, morbid obesity/TRIP presented for evaluation of worsening SOB for the last few days. Patient states that now he has SOB even at rest. No chest pain. Has been having non-productive cough and fever in ED.    -improved with IV Lasix        3.29: still has residual coughing                 REVIEW OF SYSTEMS:        CONSTITUTIONAL: No weakness, No fevers or chills, obese    ENT: No ear ache, No sorethroat    NECK: No pain, No stiffness    RESPIRATORY: +cough, No wheezing, No hemoptysis; No dyspnea    CARDIOVASCULAR: No chest pain, No palpitations    GASTROINTESTINAL: No abd pain, No nausea, No vomiting, No hematemesis, No diarrhea or constipation. No melena, No hematochezia.    GENITOURINARY: No dysuria, No  hematuria    NEUROLOGICAL: No diplopia, No paresthesia, No motor dysfunction    MUSCULOSKELETAL: No arthralgia, No myalgia    SKIN: No rashes, or lesions     PSYCH: no anxiety, no suicidal ideation        All other review of systems is negative unless indicated above        Vital Signs Last 24 Hrs    T(C): 36.4 (29 Mar 2019 10:47), Max: 36.8 (28 Mar 2019 17:01)    T(F): 97.6 (29 Mar 2019 10:47), Max: 98.3 (28 Mar 2019 17:01)    HR: 69 (29 Mar 2019 10:47) (67 - 71)    BP: 96/73 (29 Mar 2019 10:47) (96/73 - 113/74)    RR: 18 (29 Mar 2019 10:47) (18 - 18)    SpO2: 94% (29 Mar 2019 10:47) (94% - 96%)        PHYSICAL EXAM:        GENERAL: NAD, Well nourished    HEENT:  NC/AT, EOMI, PERRLA, No scleral icterus, Moist mucous membranes    NECK: Supple, No JVD    CNS:  Alert & Oriented X3, Motor Strength 5/5 B/L upper and lower extremities; DTRs 2+ intact     LUNG: Normal Breath sounds, Clear to auscultation bilaterally, No rales, No rhonchi, No wheezing    HEART: RRR; No murmurs, No rubs    ABDOMEN: +BS, ST/ND/NT    GENITOURINARY: Voiding, Bladder not distended    EXTREMITIES:  2+ Peripheral Pulses, No clubbing, No cyanosis, No tibial edema    MUSCULOSKELTAL: Joints normal ROM, No TTP, No effusion    SKIN: no rashes    RECTAL: deferred, not indicated    BREAST: deferred                             Assessment and Plan:         1. Acute respiratory failure:         2. Acute on chronic systolic CHF    resolving        3. Borderline trops likely 2 to demand ischemia        4. Fever, cough likely 2 to viral illness    No evidence of pneumonia on CXR    mucinex         5. Afib chronic on xarelto    Cont AC by xarelto        6. DM type II:    cw oral hypoglycemiants         dc home, time 37min

## 2019-03-29 NOTE — PROGRESS NOTE ADULT - ASSESSMENT
ASSESSMENT:  Chronic systolic heart failure with acute exacerbation  AICD   Nonischemic cardiomyopathy  ASD (atrial septal defect): spontaneously closes a child  Type II MI  Type 2 diabetes mellitus without complication, without long-term current use of insulin  Morbid obesity  History of congestive heart failure in his family  HTN  HLD  TRIP    PLAN:  Switch to oral diurectics  Continue with Entresto  Continue with beta blocker  and aldactone  Continue with anticoagulation.    DVT and GI prophylaxis  Out pt sleep study - pt to follow up with pulm  Plan of care discussed with patient    OK for DC    __________________________________________________________________________  Thank you for allowing me to participate in the care of your patient. Please contact me should any questions arise.    MELVA Nascimento DO, FACC

## 2019-03-29 NOTE — DISCHARGE NOTE PROVIDER - CARE PROVIDER_API CALL
Yonatan Murrieta (MD)  Cardiovascular Disease; Internal Medicine  [address] [place] [address]  Phone: [phone]  Fax: [phone]  Follow Up Time:

## 2019-04-01 ENCOUNTER — TRANSCRIPTION ENCOUNTER (OUTPATIENT)
Age: 49
End: 2019-04-01

## 2019-04-03 DIAGNOSIS — I42.8 OTHER CARDIOMYOPATHIES: ICD-10-CM

## 2019-04-03 DIAGNOSIS — B34.9 VIRAL INFECTION, UNSPECIFIED: ICD-10-CM

## 2019-04-03 DIAGNOSIS — I48.0 PAROXYSMAL ATRIAL FIBRILLATION: ICD-10-CM

## 2019-04-03 DIAGNOSIS — I48.2 CHRONIC ATRIAL FIBRILLATION: ICD-10-CM

## 2019-04-03 DIAGNOSIS — R06.02 SHORTNESS OF BREATH: ICD-10-CM

## 2019-04-03 DIAGNOSIS — I48.92 UNSPECIFIED ATRIAL FLUTTER: ICD-10-CM

## 2019-04-03 DIAGNOSIS — E66.01 MORBID (SEVERE) OBESITY DUE TO EXCESS CALORIES: ICD-10-CM

## 2019-04-03 DIAGNOSIS — Z79.01 LONG TERM (CURRENT) USE OF ANTICOAGULANTS: ICD-10-CM

## 2019-04-03 DIAGNOSIS — I50.23 ACUTE ON CHRONIC SYSTOLIC (CONGESTIVE) HEART FAILURE: ICD-10-CM

## 2019-04-03 DIAGNOSIS — Z95.810 PRESENCE OF AUTOMATIC (IMPLANTABLE) CARDIAC DEFIBRILLATOR: ICD-10-CM

## 2019-04-03 DIAGNOSIS — G47.33 OBSTRUCTIVE SLEEP APNEA (ADULT) (PEDIATRIC): ICD-10-CM

## 2019-04-03 DIAGNOSIS — E78.5 HYPERLIPIDEMIA, UNSPECIFIED: ICD-10-CM

## 2019-04-03 DIAGNOSIS — I44.7 LEFT BUNDLE-BRANCH BLOCK, UNSPECIFIED: ICD-10-CM

## 2019-04-03 DIAGNOSIS — E11.9 TYPE 2 DIABETES MELLITUS WITHOUT COMPLICATIONS: ICD-10-CM

## 2019-04-03 DIAGNOSIS — I24.8 OTHER FORMS OF ACUTE ISCHEMIC HEART DISEASE: ICD-10-CM

## 2019-04-03 DIAGNOSIS — I11.0 HYPERTENSIVE HEART DISEASE WITH HEART FAILURE: ICD-10-CM

## 2019-04-03 DIAGNOSIS — J96.00 ACUTE RESPIRATORY FAILURE, UNSPECIFIED WHETHER WITH HYPOXIA OR HYPERCAPNIA: ICD-10-CM

## 2019-04-03 DIAGNOSIS — Z79.84 LONG TERM (CURRENT) USE OF ORAL HYPOGLYCEMIC DRUGS: ICD-10-CM

## 2019-06-18 ENCOUNTER — APPOINTMENT (OUTPATIENT)
Dept: ELECTROPHYSIOLOGY | Facility: CLINIC | Age: 49
End: 2019-06-18
Payer: MEDICARE

## 2019-06-18 VITALS — WEIGHT: 315 LBS | BODY MASS INDEX: 49.44 KG/M2 | HEIGHT: 67 IN

## 2019-06-18 VITALS — SYSTOLIC BLOOD PRESSURE: 133 MMHG | DIASTOLIC BLOOD PRESSURE: 99 MMHG | HEART RATE: 64 BPM

## 2019-06-18 PROCEDURE — 93283 PRGRMG EVAL IMPLANTABLE DFB: CPT

## 2019-06-18 PROCEDURE — 93290 INTERROG DEV EVAL ICPMS IP: CPT | Mod: 26

## 2019-09-24 ENCOUNTER — APPOINTMENT (OUTPATIENT)
Dept: ELECTROPHYSIOLOGY | Facility: CLINIC | Age: 49
End: 2019-09-24
Payer: MEDICARE

## 2019-09-24 VITALS
TEMPERATURE: 98.6 F | HEART RATE: 61 BPM | BODY MASS INDEX: 49.44 KG/M2 | WEIGHT: 315 LBS | HEIGHT: 67 IN | DIASTOLIC BLOOD PRESSURE: 80 MMHG | SYSTOLIC BLOOD PRESSURE: 122 MMHG | OXYGEN SATURATION: 96 %

## 2019-09-24 PROCEDURE — 93283 PRGRMG EVAL IMPLANTABLE DFB: CPT

## 2019-12-27 RX ORDER — ATORVASTATIN CALCIUM 10 MG/1
10 TABLET, FILM COATED ORAL
Qty: 30 | Refills: 3 | Status: ACTIVE | COMMUNITY

## 2019-12-27 RX ORDER — RIVAROXABAN 20 MG/1
20 TABLET, FILM COATED ORAL
Qty: 14 | Refills: 0 | Status: ACTIVE | COMMUNITY

## 2019-12-27 RX ORDER — FUROSEMIDE 40 MG/1
40 TABLET ORAL DAILY
Qty: 30 | Refills: 0 | Status: ACTIVE | COMMUNITY

## 2019-12-27 RX ORDER — AMLODIPINE BESYLATE 5 MG/1
5 TABLET ORAL DAILY
Qty: 90 | Refills: 1 | Status: ACTIVE | COMMUNITY

## 2019-12-27 RX ORDER — SPIRONOLACTONE 25 MG/1
25 TABLET ORAL DAILY
Qty: 30 | Refills: 0 | Status: ACTIVE | COMMUNITY

## 2020-01-30 ENCOUNTER — APPOINTMENT (OUTPATIENT)
Dept: ELECTROPHYSIOLOGY | Facility: CLINIC | Age: 50
End: 2020-01-30
Payer: MEDICARE

## 2020-01-30 VITALS
DIASTOLIC BLOOD PRESSURE: 73 MMHG | WEIGHT: 315 LBS | BODY MASS INDEX: 49.44 KG/M2 | HEART RATE: 73 BPM | OXYGEN SATURATION: 100 % | SYSTOLIC BLOOD PRESSURE: 128 MMHG | HEIGHT: 67 IN

## 2020-01-30 PROCEDURE — 93283 PRGRMG EVAL IMPLANTABLE DFB: CPT

## 2020-01-30 PROCEDURE — 93290 INTERROG DEV EVAL ICPMS IP: CPT

## 2020-06-16 ENCOUNTER — APPOINTMENT (OUTPATIENT)
Dept: ELECTROPHYSIOLOGY | Facility: CLINIC | Age: 50
End: 2020-06-16
Payer: MEDICARE

## 2020-06-16 VITALS
DIASTOLIC BLOOD PRESSURE: 86 MMHG | OXYGEN SATURATION: 99 % | BODY MASS INDEX: 49.44 KG/M2 | WEIGHT: 315 LBS | TEMPERATURE: 98 F | HEIGHT: 67 IN | SYSTOLIC BLOOD PRESSURE: 133 MMHG | HEART RATE: 66 BPM

## 2020-06-16 PROCEDURE — 93283 PRGRMG EVAL IMPLANTABLE DFB: CPT

## 2020-09-16 ENCOUNTER — APPOINTMENT (OUTPATIENT)
Dept: ELECTROPHYSIOLOGY | Facility: CLINIC | Age: 50
End: 2020-09-16
Payer: MEDICARE

## 2020-09-16 VITALS
OXYGEN SATURATION: 100 % | SYSTOLIC BLOOD PRESSURE: 104 MMHG | DIASTOLIC BLOOD PRESSURE: 69 MMHG | HEIGHT: 67 IN | WEIGHT: 315 LBS | HEART RATE: 66 BPM | BODY MASS INDEX: 49.44 KG/M2

## 2020-09-16 PROCEDURE — 93280 PM DEVICE PROGR EVAL DUAL: CPT

## 2020-11-05 NOTE — ED ADULT TRIAGE NOTE - CHIEF COMPLAINT QUOTE
pt c/o difficulty breathing, cough for past couple of days, pain in chest when coughing. 02 87%, HR 66
General

## 2020-12-16 ENCOUNTER — APPOINTMENT (OUTPATIENT)
Dept: ELECTROPHYSIOLOGY | Facility: CLINIC | Age: 50
End: 2020-12-16
Payer: MEDICARE

## 2020-12-16 VITALS — DIASTOLIC BLOOD PRESSURE: 76 MMHG | SYSTOLIC BLOOD PRESSURE: 109 MMHG | HEART RATE: 67 BPM | OXYGEN SATURATION: 97 %

## 2020-12-16 PROCEDURE — 93290 INTERROG DEV EVAL ICPMS IP: CPT | Mod: 26

## 2020-12-16 PROCEDURE — 93283 PRGRMG EVAL IMPLANTABLE DFB: CPT

## 2021-03-17 ENCOUNTER — NON-APPOINTMENT (OUTPATIENT)
Age: 51
End: 2021-03-17

## 2021-03-17 ENCOUNTER — APPOINTMENT (OUTPATIENT)
Dept: ELECTROPHYSIOLOGY | Facility: CLINIC | Age: 51
End: 2021-03-17
Payer: MEDICARE

## 2021-03-17 VITALS
HEIGHT: 67 IN | SYSTOLIC BLOOD PRESSURE: 110 MMHG | DIASTOLIC BLOOD PRESSURE: 78 MMHG | WEIGHT: 315 LBS | RESPIRATION RATE: 18 BRPM | OXYGEN SATURATION: 100 % | HEART RATE: 70 BPM | BODY MASS INDEX: 49.44 KG/M2

## 2021-03-17 PROCEDURE — 93290 INTERROG DEV EVAL ICPMS IP: CPT

## 2021-03-17 PROCEDURE — 93283 PRGRMG EVAL IMPLANTABLE DFB: CPT

## 2021-04-27 ENCOUNTER — NON-APPOINTMENT (OUTPATIENT)
Age: 51
End: 2021-04-27

## 2021-04-27 ENCOUNTER — APPOINTMENT (OUTPATIENT)
Dept: ELECTROPHYSIOLOGY | Facility: CLINIC | Age: 51
End: 2021-04-27
Payer: MEDICARE

## 2021-04-27 VITALS
OXYGEN SATURATION: 99 % | BODY MASS INDEX: 49.44 KG/M2 | WEIGHT: 315 LBS | DIASTOLIC BLOOD PRESSURE: 58 MMHG | SYSTOLIC BLOOD PRESSURE: 116 MMHG | RESPIRATION RATE: 16 BRPM | HEIGHT: 67 IN | HEART RATE: 61 BPM

## 2021-04-27 PROCEDURE — 93283 PRGRMG EVAL IMPLANTABLE DFB: CPT

## 2021-04-27 PROCEDURE — 93290 INTERROG DEV EVAL ICPMS IP: CPT | Mod: 26

## 2021-07-27 ENCOUNTER — APPOINTMENT (OUTPATIENT)
Dept: ELECTROPHYSIOLOGY | Facility: CLINIC | Age: 51
End: 2021-07-27
Payer: MEDICARE

## 2021-07-27 VITALS
BODY MASS INDEX: 49.44 KG/M2 | HEART RATE: 107 BPM | SYSTOLIC BLOOD PRESSURE: 106 MMHG | DIASTOLIC BLOOD PRESSURE: 74 MMHG | OXYGEN SATURATION: 94 % | WEIGHT: 315 LBS | HEIGHT: 67 IN

## 2021-07-27 DIAGNOSIS — Z78.9 OTHER SPECIFIED HEALTH STATUS: ICD-10-CM

## 2021-07-27 PROCEDURE — 93283 PRGRMG EVAL IMPLANTABLE DFB: CPT

## 2021-07-27 PROCEDURE — 93290 INTERROG DEV EVAL ICPMS IP: CPT | Mod: 26

## 2021-10-26 ENCOUNTER — APPOINTMENT (OUTPATIENT)
Dept: ELECTROPHYSIOLOGY | Facility: CLINIC | Age: 51
End: 2021-10-26
Payer: MEDICARE

## 2021-10-26 VITALS
DIASTOLIC BLOOD PRESSURE: 72 MMHG | SYSTOLIC BLOOD PRESSURE: 110 MMHG | HEIGHT: 67 IN | WEIGHT: 315 LBS | BODY MASS INDEX: 49.44 KG/M2 | OXYGEN SATURATION: 99 % | HEART RATE: 72 BPM | RESPIRATION RATE: 16 BRPM

## 2021-10-26 PROCEDURE — 93283 PRGRMG EVAL IMPLANTABLE DFB: CPT

## 2021-10-26 RX ORDER — METOPROLOL TARTRATE 50 MG/1
50 TABLET, FILM COATED ORAL
Qty: 180 | Refills: 2 | Status: ACTIVE | COMMUNITY
Start: 2021-10-26 | End: 1900-01-01

## 2021-10-26 RX ORDER — METOPROLOL TARTRATE 50 MG/1
50 TABLET, FILM COATED ORAL
Refills: 0 | Status: DISCONTINUED | COMMUNITY
End: 2021-10-26

## 2022-01-27 ENCOUNTER — APPOINTMENT (OUTPATIENT)
Dept: ELECTROPHYSIOLOGY | Facility: CLINIC | Age: 52
End: 2022-01-27
Payer: MEDICARE

## 2022-01-27 ENCOUNTER — NON-APPOINTMENT (OUTPATIENT)
Age: 52
End: 2022-01-27

## 2022-01-27 VITALS
DIASTOLIC BLOOD PRESSURE: 78 MMHG | WEIGHT: 315 LBS | BODY MASS INDEX: 49.44 KG/M2 | HEIGHT: 67 IN | OXYGEN SATURATION: 100 % | SYSTOLIC BLOOD PRESSURE: 120 MMHG | HEART RATE: 70 BPM

## 2022-01-27 PROCEDURE — 93290 INTERROG DEV EVAL ICPMS IP: CPT | Mod: 26

## 2022-01-27 PROCEDURE — 93283 PRGRMG EVAL IMPLANTABLE DFB: CPT

## 2022-04-27 ENCOUNTER — APPOINTMENT (OUTPATIENT)
Dept: ELECTROPHYSIOLOGY | Facility: CLINIC | Age: 52
End: 2022-04-27
Payer: MEDICARE

## 2022-04-27 VITALS
DIASTOLIC BLOOD PRESSURE: 75 MMHG | WEIGHT: 315 LBS | HEART RATE: 69 BPM | RESPIRATION RATE: 16 BRPM | SYSTOLIC BLOOD PRESSURE: 110 MMHG | HEIGHT: 67 IN | OXYGEN SATURATION: 100 % | BODY MASS INDEX: 49.44 KG/M2

## 2022-04-27 PROCEDURE — 93290 INTERROG DEV EVAL ICPMS IP: CPT | Mod: 26

## 2022-04-27 PROCEDURE — 93283 PRGRMG EVAL IMPLANTABLE DFB: CPT

## 2022-07-27 ENCOUNTER — APPOINTMENT (OUTPATIENT)
Dept: ELECTROPHYSIOLOGY | Facility: CLINIC | Age: 52
End: 2022-07-27

## 2022-07-27 ENCOUNTER — NON-APPOINTMENT (OUTPATIENT)
Age: 52
End: 2022-07-27

## 2022-07-27 VITALS
DIASTOLIC BLOOD PRESSURE: 72 MMHG | WEIGHT: 315 LBS | OXYGEN SATURATION: 100 % | BODY MASS INDEX: 49.44 KG/M2 | RESPIRATION RATE: 16 BRPM | HEIGHT: 67 IN | HEART RATE: 65 BPM | SYSTOLIC BLOOD PRESSURE: 101 MMHG

## 2022-07-27 PROCEDURE — 93290 INTERROG DEV EVAL ICPMS IP: CPT | Mod: 26

## 2022-07-27 PROCEDURE — 93283 PRGRMG EVAL IMPLANTABLE DFB: CPT

## 2022-10-27 ENCOUNTER — APPOINTMENT (OUTPATIENT)
Dept: ELECTROPHYSIOLOGY | Facility: CLINIC | Age: 52
End: 2022-10-27
Payer: MEDICARE

## 2022-10-27 ENCOUNTER — NON-APPOINTMENT (OUTPATIENT)
Age: 52
End: 2022-10-27

## 2022-10-27 VITALS
SYSTOLIC BLOOD PRESSURE: 109 MMHG | WEIGHT: 315 LBS | BODY MASS INDEX: 49.44 KG/M2 | OXYGEN SATURATION: 100 % | RESPIRATION RATE: 16 BRPM | HEIGHT: 67 IN | DIASTOLIC BLOOD PRESSURE: 75 MMHG | HEART RATE: 65 BPM

## 2022-10-27 PROCEDURE — 93290 INTERROG DEV EVAL ICPMS IP: CPT | Mod: 26

## 2022-10-27 PROCEDURE — 93283 PRGRMG EVAL IMPLANTABLE DFB: CPT

## 2022-11-15 ENCOUNTER — NON-APPOINTMENT (OUTPATIENT)
Age: 52
End: 2022-11-15

## 2022-11-16 ENCOUNTER — TRANSCRIPTION ENCOUNTER (OUTPATIENT)
Age: 52
End: 2022-11-16

## 2022-11-18 ENCOUNTER — OUTPATIENT (OUTPATIENT)
Dept: OUTPATIENT SERVICES | Facility: HOSPITAL | Age: 52
LOS: 1 days | End: 2022-11-18

## 2022-11-18 ENCOUNTER — APPOINTMENT (OUTPATIENT)
Dept: DISASTER EMERGENCY | Facility: HOSPITAL | Age: 52
End: 2022-11-18

## 2022-11-18 VITALS
TEMPERATURE: 99 F | SYSTOLIC BLOOD PRESSURE: 101 MMHG | RESPIRATION RATE: 28 BRPM | OXYGEN SATURATION: 96 % | DIASTOLIC BLOOD PRESSURE: 63 MMHG | HEART RATE: 53 BPM

## 2022-11-18 VITALS
TEMPERATURE: 99 F | OXYGEN SATURATION: 97 % | HEART RATE: 68 BPM | SYSTOLIC BLOOD PRESSURE: 130 MMHG | RESPIRATION RATE: 18 BRPM | DIASTOLIC BLOOD PRESSURE: 83 MMHG

## 2022-11-18 DIAGNOSIS — Z95.810 PRESENCE OF AUTOMATIC (IMPLANTABLE) CARDIAC DEFIBRILLATOR: Chronic | ICD-10-CM

## 2022-11-18 DIAGNOSIS — U07.1 COVID-19: ICD-10-CM

## 2022-11-18 DIAGNOSIS — Z98.890 OTHER SPECIFIED POSTPROCEDURAL STATES: Chronic | ICD-10-CM

## 2022-11-18 RX ORDER — BEBTELOVIMAB 87.5 MG/ML
175 INJECTION, SOLUTION INTRAVENOUS ONCE
Refills: 0 | Status: COMPLETED | OUTPATIENT
Start: 2022-11-18 | End: 2022-11-18

## 2022-11-18 RX ADMIN — BEBTELOVIMAB 175 MILLIGRAM(S): 87.5 INJECTION, SOLUTION INTRAVENOUS at 07:50

## 2022-11-18 NOTE — MONOCLONAL ANTIBODY INFUSION - ASSESSMENT AND PLAN
CC: Monoclonal Antibody Infusion/COVID 19 Positive  52y Male with PAF, systolic CHF, AICD, HTN, HLD, DM, ASD repair as an infant, and recent dx of COVID 19+ who presents today for elective Bebtelovimab. Patient has been screened and was deemed to be a candidate.    Symptoms/ Criteria  Date of Symptom Onset: 11/15/22  Symptoms: cough, bodyache, sore throat, congestion, fever, diarrhea   Date of Positive COVID PCR: 11/16/22  Risk Profile includes: BMI       Vaccination Status: Moderna x 2    PMHx:  Infection due to severe acute respiratory syndrome coronavirus 2 (SARS-CoV-2)    ASSESSMENT:  Pt is COVID positive and symptomatic who was referred for Bebtelovimab monoclonal antibody treatment.    PLAN:  - MAB treatment explained to patient. I have reviewed the Bebtelovimab Emergency Use Authorization (EUA).   - Consent for MAB obtained.   - Risk & benefits discussed. Patient verbalized understanding of plan and agrees to treatment. All questions answered.  - 175mg of Bebtelovimab administered as a single intravenous injection over at least 30 seconds.   - Observe patient for one hour post medication administration and then if stable, discharge home with outpatient follow up as planned by PCP.    POST ASSESSMENT:   Patient completed MAB, and monitored x 1 hour post-infusion with no adverse reactions noted, remained hemodynamically stable.  - Patient tolerated injection well; denies complaints of chest pain/SOB/dizziness/palpitations.   - VSS for discharge home.  - D/C instructions given/ fact sheet included.  - Patient was instructed to self-isolate and use infection control measures (e.g wear mask, isolate, social distance, avoid sharing personal items, clean and disinfect "high touch" surfaces, and frequent handwashing according to the CDC guidelines.   - The patient was informed on what symptoms to be aware of for the next couple of days, and if there are any issues to call the 24/7 clinical call center. Patient was instructed to follow up with primary care provider as needed.    DISCHARGE at 9AM.

## 2022-11-18 NOTE — MONOCLONAL ANTIBODY INFUSION - HOME MEDICATIONS
Mucinex Max Strength 1200 mg oral tablet, extended release , 1 tab(s) orally 2 times a day   spironolactone 25 mg oral tablet , 1 tab(s) orally once a day  Entresto 49 mg-51 mg oral tablet , 1 tab(s) orally 2 times a day  metoprolol tartrate 50 mg oral tablet , 1 tab(s) orally 2 times a day  metFORMIN 500 mg oral tablet , 1 tab(s) orally 2 times a day  amLODIPine 5 mg oral tablet , 1 tab(s) orally once a day  atorvastatin 10 mg oral tablet , 1 tab(s) orally once a day  furosemide 40 mg oral tablet , 1 tab(s) orally once a day  Xarelto 20 mg oral tablet , 1 tab(s) orally once a day (in the evening)

## 2022-11-18 NOTE — MONOCLONAL ANTIBODY INFUSION - EXAM
Exam/findings:  T(C): 37.2 (11-18-22 @ 07:47), Max: 37.2 (11-18-22 @ 07:47)  HR: 68 (11-18-22 @ 07:47) (68 - 68)  BP: 130/83 (11-18-22 @ 07:47) (130/83 - 130/83)  RR: 18 (11-18-22 @ 07:47) (18 - 18)  SpO2: 97% (11-18-22 @ 07:47) (97% - 97%)    PE:   Appearance: NAD	  HEENT:  NC/AT  Cardiovascular:  No edema  Respiratory: no use of accessory muscles  Gastrointestinal:  non-distended   Skin: warm and dry  Neurologic: Non-focal  Extremities: Normal range of motion

## 2023-01-26 ENCOUNTER — NON-APPOINTMENT (OUTPATIENT)
Age: 53
End: 2023-01-26

## 2023-01-26 ENCOUNTER — APPOINTMENT (OUTPATIENT)
Dept: ELECTROPHYSIOLOGY | Facility: CLINIC | Age: 53
End: 2023-01-26
Payer: MEDICARE

## 2023-01-26 VITALS
BODY MASS INDEX: 49.44 KG/M2 | DIASTOLIC BLOOD PRESSURE: 76 MMHG | HEART RATE: 74 BPM | WEIGHT: 315 LBS | SYSTOLIC BLOOD PRESSURE: 120 MMHG | HEIGHT: 67 IN | OXYGEN SATURATION: 100 %

## 2023-01-26 PROCEDURE — 93283 PRGRMG EVAL IMPLANTABLE DFB: CPT

## 2023-01-26 PROCEDURE — 93290 INTERROG DEV EVAL ICPMS IP: CPT | Mod: 26

## 2023-04-27 ENCOUNTER — NON-APPOINTMENT (OUTPATIENT)
Age: 53
End: 2023-04-27

## 2023-04-27 ENCOUNTER — APPOINTMENT (OUTPATIENT)
Dept: ELECTROPHYSIOLOGY | Facility: CLINIC | Age: 53
End: 2023-04-27
Payer: MEDICARE

## 2023-04-27 VITALS
SYSTOLIC BLOOD PRESSURE: 114 MMHG | HEIGHT: 67 IN | WEIGHT: 315 LBS | DIASTOLIC BLOOD PRESSURE: 70 MMHG | BODY MASS INDEX: 49.44 KG/M2 | HEART RATE: 80 BPM | OXYGEN SATURATION: 100 %

## 2023-04-27 PROCEDURE — 93290 INTERROG DEV EVAL ICPMS IP: CPT | Mod: 26

## 2023-04-27 PROCEDURE — 93283 PRGRMG EVAL IMPLANTABLE DFB: CPT

## 2023-06-07 ENCOUNTER — NON-APPOINTMENT (OUTPATIENT)
Age: 53
End: 2023-06-07

## 2023-06-12 ENCOUNTER — APPOINTMENT (OUTPATIENT)
Dept: ELECTROPHYSIOLOGY | Facility: CLINIC | Age: 53
End: 2023-06-12
Payer: MEDICARE

## 2023-06-12 ENCOUNTER — NON-APPOINTMENT (OUTPATIENT)
Age: 53
End: 2023-06-12

## 2023-06-12 VITALS
OXYGEN SATURATION: 100 % | HEART RATE: 78 BPM | SYSTOLIC BLOOD PRESSURE: 102 MMHG | RESPIRATION RATE: 16 BRPM | DIASTOLIC BLOOD PRESSURE: 73 MMHG | HEIGHT: 67 IN

## 2023-06-12 DIAGNOSIS — I48.0 PAROXYSMAL ATRIAL FIBRILLATION: ICD-10-CM

## 2023-06-12 PROCEDURE — 93283 PRGRMG EVAL IMPLANTABLE DFB: CPT

## 2023-06-27 ENCOUNTER — OUTPATIENT (OUTPATIENT)
Dept: OUTPATIENT SERVICES | Facility: HOSPITAL | Age: 53
LOS: 1 days | End: 2023-06-27
Payer: MEDICARE

## 2023-06-27 ENCOUNTER — NON-APPOINTMENT (OUTPATIENT)
Age: 53
End: 2023-06-27

## 2023-06-27 DIAGNOSIS — Z95.810 PRESENCE OF AUTOMATIC (IMPLANTABLE) CARDIAC DEFIBRILLATOR: Chronic | ICD-10-CM

## 2023-06-27 DIAGNOSIS — Z98.890 OTHER SPECIFIED POSTPROCEDURAL STATES: Chronic | ICD-10-CM

## 2023-06-27 PROCEDURE — 92960 CARDIOVERSION ELECTRIC EXT: CPT

## 2023-06-27 PROCEDURE — 93005 ELECTROCARDIOGRAM TRACING: CPT | Mod: XU

## 2023-06-27 PROCEDURE — 85027 COMPLETE CBC AUTOMATED: CPT

## 2023-06-27 PROCEDURE — 80048 BASIC METABOLIC PNL TOTAL CA: CPT

## 2023-06-27 PROCEDURE — 36415 COLL VENOUS BLD VENIPUNCTURE: CPT

## 2023-06-28 DIAGNOSIS — I48.0 PAROXYSMAL ATRIAL FIBRILLATION: ICD-10-CM

## 2023-06-29 DIAGNOSIS — I48.0 PAROXYSMAL ATRIAL FIBRILLATION: ICD-10-CM

## 2023-07-26 ENCOUNTER — APPOINTMENT (OUTPATIENT)
Dept: ELECTROPHYSIOLOGY | Facility: CLINIC | Age: 53
End: 2023-07-26
Payer: MEDICARE

## 2023-07-26 ENCOUNTER — NON-APPOINTMENT (OUTPATIENT)
Age: 53
End: 2023-07-26

## 2023-07-26 VITALS
WEIGHT: 315 LBS | HEART RATE: 76 BPM | HEIGHT: 67 IN | BODY MASS INDEX: 49.44 KG/M2 | SYSTOLIC BLOOD PRESSURE: 118 MMHG | DIASTOLIC BLOOD PRESSURE: 79 MMHG | OXYGEN SATURATION: 98 %

## 2023-07-26 PROCEDURE — 93283 PRGRMG EVAL IMPLANTABLE DFB: CPT

## 2023-10-24 ENCOUNTER — APPOINTMENT (OUTPATIENT)
Dept: ELECTROPHYSIOLOGY | Facility: CLINIC | Age: 53
End: 2023-10-24
Payer: MEDICARE

## 2023-10-25 ENCOUNTER — NON-APPOINTMENT (OUTPATIENT)
Age: 53
End: 2023-10-25

## 2023-10-25 PROCEDURE — 93295 DEV INTERROG REMOTE 1/2/MLT: CPT

## 2023-10-25 PROCEDURE — 93296 REM INTERROG EVL PM/IDS: CPT

## 2024-01-23 ENCOUNTER — APPOINTMENT (OUTPATIENT)
Dept: ELECTROPHYSIOLOGY | Facility: CLINIC | Age: 54
End: 2024-01-23
Payer: MEDICARE

## 2024-01-24 ENCOUNTER — NON-APPOINTMENT (OUTPATIENT)
Age: 54
End: 2024-01-24

## 2024-01-24 PROCEDURE — 93295 DEV INTERROG REMOTE 1/2/MLT: CPT

## 2024-01-24 PROCEDURE — 93296 REM INTERROG EVL PM/IDS: CPT

## 2024-02-13 ENCOUNTER — NON-APPOINTMENT (OUTPATIENT)
Age: 54
End: 2024-02-13

## 2024-04-29 ENCOUNTER — APPOINTMENT (OUTPATIENT)
Dept: ELECTROPHYSIOLOGY | Facility: CLINIC | Age: 54
End: 2024-04-29
Payer: MEDICARE

## 2024-04-29 ENCOUNTER — NON-APPOINTMENT (OUTPATIENT)
Age: 54
End: 2024-04-29

## 2024-04-29 ENCOUNTER — EMERGENCY (EMERGENCY)
Facility: HOSPITAL | Age: 54
LOS: 0 days | Discharge: HOME | End: 2024-04-29
Attending: EMERGENCY MEDICINE
Payer: MEDICARE

## 2024-04-29 VITALS — TEMPERATURE: 98 F | DIASTOLIC BLOOD PRESSURE: 63 MMHG | HEART RATE: 83 BPM | SYSTOLIC BLOOD PRESSURE: 100 MMHG

## 2024-04-29 VITALS
DIASTOLIC BLOOD PRESSURE: 60 MMHG | HEART RATE: 89 BPM | OXYGEN SATURATION: 96 % | SYSTOLIC BLOOD PRESSURE: 98 MMHG | HEIGHT: 67 IN | WEIGHT: 315 LBS | BODY MASS INDEX: 49.44 KG/M2

## 2024-04-29 VITALS
HEIGHT: 66 IN | OXYGEN SATURATION: 97 % | HEART RATE: 75 BPM | RESPIRATION RATE: 19 BRPM | SYSTOLIC BLOOD PRESSURE: 101 MMHG | DIASTOLIC BLOOD PRESSURE: 66 MMHG | TEMPERATURE: 99 F

## 2024-04-29 DIAGNOSIS — Z98.890 OTHER SPECIFIED POSTPROCEDURAL STATES: Chronic | ICD-10-CM

## 2024-04-29 DIAGNOSIS — Z95.810 PRESENCE OF AUTOMATIC (IMPLANTABLE) CARDIAC DEFIBRILLATOR: Chronic | ICD-10-CM

## 2024-04-29 DIAGNOSIS — Z95.810 PRESENCE OF AUTOMATIC (IMPLANTABLE) CARDIAC DEFIBRILLATOR: ICD-10-CM

## 2024-04-29 DIAGNOSIS — I50.22 CHRONIC SYSTOLIC (CONGESTIVE) HEART FAILURE: ICD-10-CM

## 2024-04-29 LAB
ALBUMIN SERPL ELPH-MCNC: 3.9 G/DL — SIGNIFICANT CHANGE UP (ref 3.3–5)
ALP SERPL-CCNC: 40 U/L — SIGNIFICANT CHANGE UP (ref 40–120)
ALT FLD-CCNC: 14 U/L — SIGNIFICANT CHANGE UP (ref 12–78)
ANION GAP SERPL CALC-SCNC: 4 MMOL/L — LOW (ref 5–17)
APTT BLD: 35.9 SEC — HIGH (ref 24.5–35.6)
AST SERPL-CCNC: 11 U/L — LOW (ref 15–37)
BASOPHILS # BLD AUTO: 0.04 K/UL — SIGNIFICANT CHANGE UP (ref 0–0.2)
BASOPHILS NFR BLD AUTO: 0.5 % — SIGNIFICANT CHANGE UP (ref 0–2)
BILIRUB SERPL-MCNC: 0.8 MG/DL — SIGNIFICANT CHANGE UP (ref 0.2–1.2)
BUN SERPL-MCNC: 22 MG/DL — SIGNIFICANT CHANGE UP (ref 7–23)
CALCIUM SERPL-MCNC: 9.3 MG/DL — SIGNIFICANT CHANGE UP (ref 8.5–10.1)
CHLORIDE SERPL-SCNC: 109 MMOL/L — HIGH (ref 96–108)
CO2 SERPL-SCNC: 27 MMOL/L — SIGNIFICANT CHANGE UP (ref 22–31)
CREAT SERPL-MCNC: 1.47 MG/DL — HIGH (ref 0.5–1.3)
EGFR: 56 ML/MIN/1.73M2 — LOW
EOSINOPHIL # BLD AUTO: 0.07 K/UL — SIGNIFICANT CHANGE UP (ref 0–0.5)
EOSINOPHIL NFR BLD AUTO: 0.9 % — SIGNIFICANT CHANGE UP (ref 0–6)
GLUCOSE SERPL-MCNC: 114 MG/DL — HIGH (ref 70–99)
HCT VFR BLD CALC: 43.1 % — SIGNIFICANT CHANGE UP (ref 39–50)
HGB BLD-MCNC: 13.7 G/DL — SIGNIFICANT CHANGE UP (ref 13–17)
IMM GRANULOCYTES NFR BLD AUTO: 0.3 % — SIGNIFICANT CHANGE UP (ref 0–0.9)
INR BLD: 1.44 RATIO — HIGH (ref 0.85–1.18)
LYMPHOCYTES # BLD AUTO: 2.67 K/UL — SIGNIFICANT CHANGE UP (ref 1–3.3)
LYMPHOCYTES # BLD AUTO: 35.7 % — SIGNIFICANT CHANGE UP (ref 13–44)
MCHC RBC-ENTMCNC: 28.6 PG — SIGNIFICANT CHANGE UP (ref 27–34)
MCHC RBC-ENTMCNC: 31.8 GM/DL — LOW (ref 32–36)
MCV RBC AUTO: 90 FL — SIGNIFICANT CHANGE UP (ref 80–100)
MONOCYTES # BLD AUTO: 0.73 K/UL — SIGNIFICANT CHANGE UP (ref 0–0.9)
MONOCYTES NFR BLD AUTO: 9.8 % — SIGNIFICANT CHANGE UP (ref 2–14)
NEUTROPHILS # BLD AUTO: 3.95 K/UL — SIGNIFICANT CHANGE UP (ref 1.8–7.4)
NEUTROPHILS NFR BLD AUTO: 52.8 % — SIGNIFICANT CHANGE UP (ref 43–77)
PLATELET # BLD AUTO: 238 K/UL — SIGNIFICANT CHANGE UP (ref 150–400)
POTASSIUM SERPL-MCNC: 4.6 MMOL/L — SIGNIFICANT CHANGE UP (ref 3.5–5.3)
POTASSIUM SERPL-SCNC: 4.6 MMOL/L — SIGNIFICANT CHANGE UP (ref 3.5–5.3)
PROT SERPL-MCNC: 7.9 GM/DL — SIGNIFICANT CHANGE UP (ref 6–8.3)
PROTHROM AB SERPL-ACNC: 16.1 SEC — HIGH (ref 9.5–13)
RBC # BLD: 4.79 M/UL — SIGNIFICANT CHANGE UP (ref 4.2–5.8)
RBC # FLD: 14.6 % — HIGH (ref 10.3–14.5)
SODIUM SERPL-SCNC: 140 MMOL/L — SIGNIFICANT CHANGE UP (ref 135–145)
WBC # BLD: 7.48 K/UL — SIGNIFICANT CHANGE UP (ref 3.8–10.5)
WBC # FLD AUTO: 7.48 K/UL — SIGNIFICANT CHANGE UP (ref 3.8–10.5)

## 2024-04-29 PROCEDURE — 70450 CT HEAD/BRAIN W/O DYE: CPT | Mod: 26,MC

## 2024-04-29 PROCEDURE — 85025 COMPLETE CBC W/AUTO DIFF WBC: CPT

## 2024-04-29 PROCEDURE — 93010 ELECTROCARDIOGRAM REPORT: CPT

## 2024-04-29 PROCEDURE — 36415 COLL VENOUS BLD VENIPUNCTURE: CPT

## 2024-04-29 PROCEDURE — 93005 ELECTROCARDIOGRAM TRACING: CPT

## 2024-04-29 PROCEDURE — 85730 THROMBOPLASTIN TIME PARTIAL: CPT

## 2024-04-29 PROCEDURE — 99285 EMERGENCY DEPT VISIT HI MDM: CPT | Mod: GC

## 2024-04-29 PROCEDURE — 80053 COMPREHEN METABOLIC PANEL: CPT

## 2024-04-29 PROCEDURE — 70450 CT HEAD/BRAIN W/O DYE: CPT | Mod: MC

## 2024-04-29 PROCEDURE — 99285 EMERGENCY DEPT VISIT HI MDM: CPT | Mod: 25

## 2024-04-29 PROCEDURE — 93282 PRGRMG EVAL IMPLANTABLE DFB: CPT

## 2024-04-29 PROCEDURE — 85610 PROTHROMBIN TIME: CPT

## 2024-04-29 NOTE — ED ADULT TRIAGE NOTE - CHIEF COMPLAINT QUOTE
Pt coming into the ED with c/o see chief complaint. Pt has hx of HTN, Afib on Xarelto, TIAs. Pt endorses last Wednesday while driving his experience left eye blurriness for a few minutes. Pt reports his last TIA was in 2006. Pt was told by MD he was in AFIB. Pt BEFAST -. Pt denies palpitations and CP. NKDA.

## 2024-04-29 NOTE — ED ADULT NURSE NOTE - SUICIDE SCREENING QUESTION 2
ASSESSMENT:  51M w/ PMHX pre diabetes, not on home medication presented for fever and abdominal pain. U/S revealing of Gallstones, but no wall thickening or pericholecystic fluid. MRCP performed, no filling defect but some gallbladder wall edema now s/p GI ERCP for  sphincterotomy, 5F X 4cm PD stent placement with sludge removal and s/p laparoscopic cholecystectomy    PLAN:  - Pain Management  - Strict Ins and Out  - Continue Current Diet Orders  - K>4, MG>2, Phos>3  - Encourage ambulation  - GI/DVT prophylaxis    Lines/Tubes: PIV    TRAUMA SPECTRA: [id number]   Patient unable to complete

## 2024-04-29 NOTE — ED STATDOCS - OBJECTIVE STATEMENT
54 y/o male with a PMHx of Afib on Xarelto, AICD, ASD, chronic systolic CHF, DM2, HTN, HLD, morbid obesity TRIP, PAF, TIA in 2006, Saritha presents to the ED for evaluation. Pt reports last week he was driving and had an episode of blurry vision lasting a few seconds then self resolved. Denies HA. No other complaints at this time.

## 2024-04-29 NOTE — ED ADULT NURSE NOTE - NSFALLHARMRISKINTERV_ED_ALL_ED

## 2024-04-29 NOTE — ED STATDOCS - PROGRESS NOTE DETAILS
Gorge Galan, DO PGY-3: Patient's labs nonactionable aside from mild elevation of creatinine which patient was made aware of the need to follow-up with PMD and stay well-hydrated.  CT head showing chronic changes.  Will discharge patient to follow-up with neurology to obtain likely outpatient MRI.

## 2024-04-29 NOTE — ED STATDOCS - CLINICAL SUMMARY MEDICAL DECISION MAKING FREE TEXT BOX
In summary this is a 53-year-old male who presents with chief complaint of transient left eye blurriness last week, resolved.  Vital signs within normal limits on arrival.  No focal neurologic findings on exam.  CBC within normal limits, CMP with mild creatinine elevation 1.47, was 1.34 in June 2023.  CT head of the head was negative for acute findings.  Possible TIA.  Patient is already on Xarelto, and symptoms a week out.  Does not require admission at this time, recommend close follow-up with neurology, continue anticoagulation, return to ED for further evaluation management if any worsening..

## 2024-04-29 NOTE — ED STATDOCS - PATIENT PORTAL LINK FT
You can access the FollowMyHealth Patient Portal offered by [facility] by registering at the following website: [email or web address] By joining [employer]’s FollowMyHealth portal, you will also be able to view your health information using other applications (apps) compatible with our system.

## 2024-04-29 NOTE — ED STATDOCS - EYES, MLM
clear bilaterally.  Pupils equal, round, and reactive to light. clear bilaterally.  Pupils equal, round, and reactive to light. EOMI

## 2024-04-29 NOTE — ED STATDOCS - NSFOLLOWUPINSTRUCTIONS_ED_ALL_ED_FT
1.  Please follow-up with neurology, the hospital should be calling you with an appointment   2.  Please follow-up with your primary care doctor with next week regarding your elevation of your kidney function called creatinine.  3.  Please return to the ER if develop any new vision changes, confusion, weakness, numbness or tingling, or anything of concern

## 2024-04-29 NOTE — ASSESSMENT
[FreeTextEntry1] : 53-year-old who presented for ICD check and now in persistent at [facility].  He stated last week while driving he lost vision for a few seconds in his left eye.  He stated that the vision returned to normal.  He has a hx of TIA from over 10 years ago.

## 2024-04-30 DIAGNOSIS — Z79.01 LONG TERM (CURRENT) USE OF ANTICOAGULANTS: ICD-10-CM

## 2024-04-30 DIAGNOSIS — I48.0 PAROXYSMAL ATRIAL FIBRILLATION: ICD-10-CM

## 2024-04-30 DIAGNOSIS — I50.22 CHRONIC SYSTOLIC (CONGESTIVE) HEART FAILURE: ICD-10-CM

## 2024-04-30 DIAGNOSIS — E78.5 HYPERLIPIDEMIA, UNSPECIFIED: ICD-10-CM

## 2024-04-30 DIAGNOSIS — H53.8 OTHER VISUAL DISTURBANCES: ICD-10-CM

## 2024-04-30 DIAGNOSIS — R79.89 OTHER SPECIFIED ABNORMAL FINDINGS OF BLOOD CHEMISTRY: ICD-10-CM

## 2024-04-30 DIAGNOSIS — Z95.810 PRESENCE OF AUTOMATIC (IMPLANTABLE) CARDIAC DEFIBRILLATOR: ICD-10-CM

## 2024-04-30 DIAGNOSIS — G47.33 OBSTRUCTIVE SLEEP APNEA (ADULT) (PEDIATRIC): ICD-10-CM

## 2024-04-30 DIAGNOSIS — I11.0 HYPERTENSIVE HEART DISEASE WITH HEART FAILURE: ICD-10-CM

## 2024-04-30 DIAGNOSIS — Q21.10 ATRIAL SEPTAL DEFECT, UNSPECIFIED: ICD-10-CM

## 2024-04-30 DIAGNOSIS — E66.01 MORBID (SEVERE) OBESITY DUE TO EXCESS CALORIES: ICD-10-CM

## 2024-04-30 DIAGNOSIS — Z86.73 PERSONAL HISTORY OF TRANSIENT ISCHEMIC ATTACK (TIA), AND CEREBRAL INFARCTION WITHOUT RESIDUAL DEFICITS: ICD-10-CM

## 2024-04-30 DIAGNOSIS — E11.9 TYPE 2 DIABETES MELLITUS WITHOUT COMPLICATIONS: ICD-10-CM

## 2024-05-02 ENCOUNTER — INPATIENT (INPATIENT)
Facility: HOSPITAL | Age: 54
LOS: 2 days | Discharge: HOME | DRG: 149 | End: 2024-05-05
Attending: HOSPITALIST | Admitting: INTERNAL MEDICINE
Payer: MEDICARE

## 2024-05-02 VITALS
OXYGEN SATURATION: 99 % | DIASTOLIC BLOOD PRESSURE: 66 MMHG | RESPIRATION RATE: 18 BRPM | HEIGHT: 66 IN | HEART RATE: 79 BPM | SYSTOLIC BLOOD PRESSURE: 103 MMHG | TEMPERATURE: 98 F | WEIGHT: 315 LBS

## 2024-05-02 DIAGNOSIS — Z95.810 PRESENCE OF AUTOMATIC (IMPLANTABLE) CARDIAC DEFIBRILLATOR: Chronic | ICD-10-CM

## 2024-05-02 DIAGNOSIS — Z98.890 OTHER SPECIFIED POSTPROCEDURAL STATES: Chronic | ICD-10-CM

## 2024-05-02 LAB
ALBUMIN SERPL ELPH-MCNC: 3.7 G/DL — SIGNIFICANT CHANGE UP (ref 3.3–5)
ALP SERPL-CCNC: 39 U/L — LOW (ref 40–120)
ALT FLD-CCNC: 15 U/L — SIGNIFICANT CHANGE UP (ref 12–78)
ANION GAP SERPL CALC-SCNC: 4 MMOL/L — LOW (ref 5–17)
APTT BLD: 34.6 SEC — SIGNIFICANT CHANGE UP (ref 24.5–35.6)
AST SERPL-CCNC: 10 U/L — LOW (ref 15–37)
BASOPHILS # BLD AUTO: 0.03 K/UL — SIGNIFICANT CHANGE UP (ref 0–0.2)
BASOPHILS NFR BLD AUTO: 0.4 % — SIGNIFICANT CHANGE UP (ref 0–2)
BILIRUB SERPL-MCNC: 0.7 MG/DL — SIGNIFICANT CHANGE UP (ref 0.2–1.2)
BLD GP AB SCN SERPL QL: SIGNIFICANT CHANGE UP
BUN SERPL-MCNC: 17 MG/DL — SIGNIFICANT CHANGE UP (ref 7–23)
CALCIUM SERPL-MCNC: 8.9 MG/DL — SIGNIFICANT CHANGE UP (ref 8.5–10.1)
CHLORIDE SERPL-SCNC: 108 MMOL/L — SIGNIFICANT CHANGE UP (ref 96–108)
CO2 SERPL-SCNC: 27 MMOL/L — SIGNIFICANT CHANGE UP (ref 22–31)
CREAT SERPL-MCNC: 1.29 MG/DL — SIGNIFICANT CHANGE UP (ref 0.5–1.3)
EGFR: 65 ML/MIN/1.73M2 — SIGNIFICANT CHANGE UP
EOSINOPHIL # BLD AUTO: 0.05 K/UL — SIGNIFICANT CHANGE UP (ref 0–0.5)
EOSINOPHIL NFR BLD AUTO: 0.6 % — SIGNIFICANT CHANGE UP (ref 0–6)
GLUCOSE SERPL-MCNC: 135 MG/DL — HIGH (ref 70–99)
HCT VFR BLD CALC: 43.3 % — SIGNIFICANT CHANGE UP (ref 39–50)
HGB BLD-MCNC: 13.8 G/DL — SIGNIFICANT CHANGE UP (ref 13–17)
IMM GRANULOCYTES NFR BLD AUTO: 0.2 % — SIGNIFICANT CHANGE UP (ref 0–0.9)
INR BLD: 1.44 RATIO — HIGH (ref 0.85–1.18)
LYMPHOCYTES # BLD AUTO: 1.96 K/UL — SIGNIFICANT CHANGE UP (ref 1–3.3)
LYMPHOCYTES # BLD AUTO: 23.5 % — SIGNIFICANT CHANGE UP (ref 13–44)
MAGNESIUM SERPL-MCNC: 2.1 MG/DL — SIGNIFICANT CHANGE UP (ref 1.6–2.6)
MCHC RBC-ENTMCNC: 28.7 PG — SIGNIFICANT CHANGE UP (ref 27–34)
MCHC RBC-ENTMCNC: 31.9 GM/DL — LOW (ref 32–36)
MCV RBC AUTO: 90 FL — SIGNIFICANT CHANGE UP (ref 80–100)
MONOCYTES # BLD AUTO: 0.69 K/UL — SIGNIFICANT CHANGE UP (ref 0–0.9)
MONOCYTES NFR BLD AUTO: 8.3 % — SIGNIFICANT CHANGE UP (ref 2–14)
NEUTROPHILS # BLD AUTO: 5.59 K/UL — SIGNIFICANT CHANGE UP (ref 1.8–7.4)
NEUTROPHILS NFR BLD AUTO: 67 % — SIGNIFICANT CHANGE UP (ref 43–77)
PLATELET # BLD AUTO: 240 K/UL — SIGNIFICANT CHANGE UP (ref 150–400)
POTASSIUM SERPL-MCNC: 4.8 MMOL/L — SIGNIFICANT CHANGE UP (ref 3.5–5.3)
POTASSIUM SERPL-SCNC: 4.8 MMOL/L — SIGNIFICANT CHANGE UP (ref 3.5–5.3)
PROT SERPL-MCNC: 7.6 GM/DL — SIGNIFICANT CHANGE UP (ref 6–8.3)
PROTHROM AB SERPL-ACNC: 16.1 SEC — HIGH (ref 9.5–13)
RBC # BLD: 4.81 M/UL — SIGNIFICANT CHANGE UP (ref 4.2–5.8)
RBC # FLD: 14.7 % — HIGH (ref 10.3–14.5)
SODIUM SERPL-SCNC: 139 MMOL/L — SIGNIFICANT CHANGE UP (ref 135–145)
TROPONIN I, HIGH SENSITIVITY RESULT: 257.61 NG/L — HIGH
TROPONIN I, HIGH SENSITIVITY RESULT: 269.59 NG/L — HIGH
WBC # BLD: 8.34 K/UL — SIGNIFICANT CHANGE UP (ref 3.8–10.5)
WBC # FLD AUTO: 8.34 K/UL — SIGNIFICANT CHANGE UP (ref 3.8–10.5)

## 2024-05-02 PROCEDURE — 99285 EMERGENCY DEPT VISIT HI MDM: CPT | Mod: FS

## 2024-05-02 PROCEDURE — 70496 CT ANGIOGRAPHY HEAD: CPT | Mod: 26,MC

## 2024-05-02 PROCEDURE — 70498 CT ANGIOGRAPHY NECK: CPT | Mod: 26,MC

## 2024-05-02 PROCEDURE — 71045 X-RAY EXAM CHEST 1 VIEW: CPT | Mod: 26

## 2024-05-02 RX ORDER — METOPROLOL TARTRATE 50 MG
1 TABLET ORAL
Qty: 0 | Refills: 0 | DISCHARGE

## 2024-05-02 RX ORDER — FUROSEMIDE 40 MG
1 TABLET ORAL
Refills: 0 | DISCHARGE

## 2024-05-02 RX ORDER — METFORMIN HYDROCHLORIDE 850 MG/1
1 TABLET ORAL
Qty: 0 | Refills: 0 | DISCHARGE

## 2024-05-02 RX ORDER — AMLODIPINE BESYLATE 2.5 MG/1
1 TABLET ORAL
Qty: 0 | Refills: 0 | DISCHARGE

## 2024-05-02 RX ORDER — MECLIZINE HCL 12.5 MG
25 TABLET ORAL ONCE
Refills: 0 | Status: COMPLETED | OUTPATIENT
Start: 2024-05-02 | End: 2024-05-02

## 2024-05-02 RX ORDER — RIVAROXABAN 15 MG-20MG
1 KIT ORAL
Qty: 0 | Refills: 0 | DISCHARGE

## 2024-05-02 RX ORDER — SACUBITRIL AND VALSARTAN 24; 26 MG/1; MG/1
1 TABLET, FILM COATED ORAL
Qty: 0 | Refills: 0 | DISCHARGE

## 2024-05-02 RX ORDER — ONDANSETRON 8 MG/1
4 TABLET, FILM COATED ORAL ONCE
Refills: 0 | Status: COMPLETED | OUTPATIENT
Start: 2024-05-02 | End: 2024-05-02

## 2024-05-02 RX ADMIN — Medication 25 MILLIGRAM(S): at 19:41

## 2024-05-02 RX ADMIN — ONDANSETRON 4 MILLIGRAM(S): 8 TABLET, FILM COATED ORAL at 19:42

## 2024-05-02 NOTE — PHARMACOTHERAPY INTERVENTION NOTE - COMMENTS
Medication reconciliation completed.  Reviewed Medication list and confirmed med allergies with patient; confirmed with Dr. First Medbogdan.

## 2024-05-02 NOTE — ED PROVIDER NOTE - OBJECTIVE STATEMENT
53M was at home today when he developed sudden onset dizziness/spinning and generalized HA at4:30. Pt felt nauseous and sweaty. No CP/SOB/vision changes/AMS. Pt on Xarelto for Afib. PMD Colleen. Edgard Toth. BEBO Lerner 53M was at home today when he developed sudden onset dizziness/spinning and generalized HA at4:30. Pt felt nauseous and sweaty. No CP/SOB/vision changes/AMS. Pt on Xarelto for Afib. PMD Colleen. Edgard Toth. EPS Felecia Donnelly MD, ED Attmasood MD:  Case d/w ED PA, pt seen/evaluated in ED. 53M BIBA from home regarding sudden onset dizziness/spinning and generalized HA at 4:30 pm. Associated nauseous and sweaty. No CP/SOB/vision changes/AMS. Pt on Xarelto for Afib. PMD Strogacrosey. Edgard Toth. EPS Felecia Donnelly MD, ED Attdg MD:  Case d/w ED PA, pt seen/evaluated in ED.  54 yo WM, PMH incl. HTN, HLD, vertigo, chronic CHF, TRIP, PAF on Xarelto, + AICD, s/p ASD repair age 3, BIBA from home c/o'ing acute onset ~ 4:30 PM of dizziness described as spinning sensation, w/ associated N, diaphoresis, & HA.  No cp, SOB, F/C, focal vision/speech/swallow changes, extremity/face weak/numb/tingling, AMS.  Dizziness aggravated by head/neck movement.  Pt initially screened at EMS Triage: BEFAST = 0, symptoms appear peripheral-related & not central, not Code Stroke candidate.  Pt reports symptomatic improvement s/p initial ED meds.

## 2024-05-02 NOTE — ED PROVIDER NOTE - IV ALTEPLASE DOOR HIDDEN
Home Health forms were faxed on 5/10/18 after the patient's OV. Forms were faxed again now to [facility] @ [phone]. Detailed message informing patient of this.    show

## 2024-05-02 NOTE — ED PROVIDER NOTE - CONSTITUTIONAL, MLM
Well appearing, awake, alert, oriented to person, place, time/situation. Appears uncomfortable though not in extremis. normal...

## 2024-05-02 NOTE — ED PROVIDER NOTE - CLINICAL SUMMARY MEDICAL DECISION MAKING FREE TEXT BOX
Plan: labs, CTA head/neck, EKG, CXR, IV Zofran, po Meclizine.    20:15, C MD Andrzej:  CXr wet read personally by me: globular heart, MANISHA.  EKG + vent paced.  labs notable for high Troponin.  CT reports pending.  Pt reports symptomatic improvement of the vertigo s/p meds, aware & agreeable with Tele admit for serial Troponin testing, inpt Cardio consult tomorrow. Plan: labs, CTA head/neck, EKG, CXR, IV Zofran, po Meclizine.    20:15, C MD Andrzej:  CXr wet read personally by me: globular heart, MANISHA.  EKG + vent paced.  labs notable for high Troponin.  CT reports pending.  Pt reports symptomatic improvement of the vertigo s/p meds, aware & agreeable with Tele admit for serial Troponin testing, inpt Cardio consult tomorrow.    20:45, C MD Andrzej:  Admit hospitalist called, phone message left.    22:45, C MD Andrzej:  Case d/w & accepted by Dr. TIMOTHY Garber for Tele Observation. 52 yo WM, PMH incl. HTN, HLD, vertigo, chronic CHF, TRIP, PAF on Xarelto, + AICD, s/p ASD repair age 3, BIBA from home c/o'ing acute onset ~ 4:30 PM of dizziness described as spinning sensation, w/ associated N, diaphoresis, & HA.  Plan: labs, CTA head/neck, EKG, CXR, IV Zofran, po Meclizine.    20:15, KAM Donnelly MD:  CXR wet read personally by me: globular heart, MANISHA.  EKG + ventricular paced.  Labs notable for high Troponin.  CT reports pending.  Pt reports symptomatic improvement of the vertigo s/p meds, aware & agreeable with Tele admit for serial Troponin testing, inpt Cardio consult tomorrow.    20:45, KAM Donnelly MD:  Admit hospitalist called, phone message left.    22:45, KAM Donnelly MD:  Case d/w & accepted by Dr. TIMOTHY Garber for Tele Observation.

## 2024-05-02 NOTE — ED ADULT NURSE NOTE - NSFALLHARMRISKINTERV_ED_ALL_ED

## 2024-05-02 NOTE — ED ADULT NURSE NOTE - OBJECTIVE STATEMENT
pt presents to ED with c/o headache, nausea, sweats, and dizziness that started 30 minutes pta approximtely 16:30 today. Patient was in hospital 3 day ago. Denies viison changes. Pt has hx of T2DM takes metformin and BG was elevated this morning. Pt lying comfortable in bed and no acute distress noted.

## 2024-05-02 NOTE — ED PROVIDER NOTE - NEUROLOGICAL, MLM
GCS 15. normocephalic atraumatic. no spinal tenderness. CN II-XII grossly intact. neg pronator drift pupils PERRL 4mm bilat, EOMI. no gross visual field deficit. no dysmetria, neg finger to nose, heel to shin.

## 2024-05-02 NOTE — ED PROVIDER NOTE - ATTENDING APP SHARED VISIT CONTRIBUTION OF CARE
JOSE DAVID Donnelly MD, ED Attending physician:  This was a shared visit with DARA.  I reviewed and verified the documentation and independently performed the documented history/exam/mdm.

## 2024-05-02 NOTE — ED ADULT TRIAGE NOTE - NS ED NURSE BANDS TYPE
/60   Ht 5' 9.5\" (1.765 m)   Wt 72 kg   LMP 02/06/2019   History reviewed. No pertinent past medical history.  Past Surgical History:   Procedure Laterality Date   • Pap smear,thin prep(inc 00648)  1/16/2009    Negative   • Freeman tooth extraction       Current Outpatient Medications   Medication   • norethindrone-ethinyl estradiol (MICROGESTIN 1/20) 1-20 MG-MCG per tablet   • Biotin 1 MG Cap   • vitamin - therapeutic multivitamins w/minerals (CENTRUM SILVER,THERA-M) TABS   • norgestimate-ethinyl estradiol, triphasic, (TRINESSA, 28,) 0.18/0.215/0.25 MG-35 MCG tablet     No current facility-administered medications for this visit.        Chief Complaint   Patient presents with   • Gyn Exam       History of Present Illness:    The patient presents to the office today for an annual GYN exam.  The patient denies history of abnormal periods, pelvic pain, abnormal vaginal discharge, breast masses or nipple discharge, depression, UTI symptoms, urinary incontinence, back pain, abdominal pain, headaches and bowel problems or fecal incontinence but reports increased stress and occasional chest pressure  The patient does note regular exercise, healthy diet and contraception.  Concerns noted and addressed today include contraception issues and stress and chest pain.  Since her last visit, the patient has not had  new medications, ED visits, hospitalizations, surgery and consults.  Last colonoscopy:  0 years ago.      Review of Systems   The patient denies weight loss, hot flashes, night sweats, difficulty with concentration, insomnia, incontinence, dysuria, hematuria, frequency, nocturia, urgency, vaginal discharge, pruritus, vaginal odor, amenorrhea, menorrhagia, oligomenorrhea, dysmenorrhea, pelvic pain, genital lesion, decreased libido, nausea, vomiting, diarrhea, constipation, change in bowel habits, abdominal pain, left breast lump, right breast lump, nipple discharge, bloody nipple discharge, breast pain,  depression, and anxiety.   Pt and her  both lost their jobs this summer and has been under increased stress.  She has ome chest pressure that lasts for about 5 minutes and starts with feeling anxious.  She denies SOB, diaphoresis, arm pain, nausea or vomiting or headache associated with the pain.        Physical Exam:  General:  Well developed, well nourished, in no acute distress  Head:  Normocephalic and atraumatic   Neck:  No masses, thyromegaly, or abnormal cervical nodes  Breasts:  Symmetric, no masses or nipple discharge bilaterally  Lungs:  Clear bilaterally to A and P  Heart:  Regular rate and rhythm.  S1 and S2 without murmurs, rubs, gallops or clicks  Abdomen:  Bowel sounds positive.  Abdomen soft and nontender without masses, no hepatosplenomegaly or hernias noted  Musculoskeletal:  No deformity or scoliosis noted with normal posture and gait  Pulses:  Pulses normal in all four extremities  Extremities:  No clubbing, cyanosis, edema or deformity noted with normal full range of motion of upper and lower extremities  Neurologic:  No focal deficits  Skin:  Intact without lesions or rashes  Cervical Nodes:  No significant adenopathy  Axillary Nodes:  No significant adenopathy  Inguinal nodes:  No significant adenopathy   Psych:  Alert and cooperative; normal mood and affect; normal attention span and concentration    Pelvic Exam:  External Genitalia:  Normal appearance, no lesions or discharge  Urethra:  No discharge  Bladder:  No cystocele  Vagina:  Normal appearing without lesions or discharge  Cervix:  Normal appearance, no lesions or discharge, no cervical motion tenderness  Uterus:  Mobile, nontender, no masses  Uterine size:  Normal  Adnexa:  No masses, nontender  Rectal:  Non-confirmatory      Impression:    1-normal gyn exam, pap not indicated  2-no contraindication to continued OCP use  3-ocassional chest pressure    Plan:    1-reviewed general health guidelines with patient including diet,  exercise and calcium.  Encouraged monthly SBE's.  2-microgestin 1/20 x 1 year  3-referred to primary care MD for evaluation and behavorial health referral for possible anxiety     Name band;

## 2024-05-02 NOTE — ED ADULT TRIAGE NOTE - CHIEF COMPLAINT QUOTE
pt presents to Ed with complaints of dizziness x approximately 30 minutes PTA. pt enodrses dizziness is sudden onset. endorses hx of TIA with dizziness as a symptom. pt enodrses symptoms are worse than previous TIA. seen in [facility] monday for vision changes. EMS also endorses unsteady gait with them PTA. MD Donnelly in triage to eval pt. BEFAST - in triage. no code stroke activated. pt sent to room for further eval by MD.

## 2024-05-02 NOTE — ED PROVIDER NOTE - NSICDXPASTSURGICALHX_GEN_ALL_CORE_FT
PAST SURGICAL HISTORY:  AICD (automatic cardioverter/defibrillator) present 5/2011    H/O heart surgery ASD repair at 3 years old     negative...

## 2024-05-02 NOTE — ED PROVIDER NOTE - PHYSICAL EXAMINATION
KAM Donnelly MD,. ED Attdg MD: + Acutely ill-appearing upon ED arrival.    Gen'l: obese WM, alert, no respiratory distress, currently no acute distress s/p ED meds administered.  Eyes: PERRL, EOMI, no obvious nystagmus  CV: RRR, normal radial pulse  Lungs: CTA, normal respirations  GI: soft, NT, BS+  MSK: Neck: NT & supple w/o pain.  ROSS x 4, no focal extremity swelling nor tenderness  Skin: no tactile warmth, no rash  Neuro: A+O x 3, CNs 2 - 12 intact, normal speech, no focal motor/sensory deficits, FTN & HTS normal.  NIHSS = 0.

## 2024-05-03 ENCOUNTER — RESULT REVIEW (OUTPATIENT)
Age: 54
End: 2024-05-03

## 2024-05-03 DIAGNOSIS — R42 DIZZINESS AND GIDDINESS: ICD-10-CM

## 2024-05-03 LAB
A1C WITH ESTIMATED AVERAGE GLUCOSE RESULT: 6.6 % — HIGH (ref 4–5.6)
ADD ON TEST-SPECIMEN IN LAB: SIGNIFICANT CHANGE UP
ALBUMIN SERPL ELPH-MCNC: 3.7 G/DL — SIGNIFICANT CHANGE UP (ref 3.3–5)
ALP SERPL-CCNC: 40 U/L — SIGNIFICANT CHANGE UP (ref 40–120)
ALT FLD-CCNC: 17 U/L — SIGNIFICANT CHANGE UP (ref 12–78)
ANION GAP SERPL CALC-SCNC: 6 MMOL/L — SIGNIFICANT CHANGE UP (ref 5–17)
AST SERPL-CCNC: 9 U/L — LOW (ref 15–37)
BASOPHILS # BLD AUTO: 0.03 K/UL — SIGNIFICANT CHANGE UP (ref 0–0.2)
BASOPHILS NFR BLD AUTO: 0.3 % — SIGNIFICANT CHANGE UP (ref 0–2)
BILIRUB SERPL-MCNC: 0.5 MG/DL — SIGNIFICANT CHANGE UP (ref 0.2–1.2)
BUN SERPL-MCNC: 27 MG/DL — HIGH (ref 7–23)
CALCIUM SERPL-MCNC: 9.1 MG/DL — SIGNIFICANT CHANGE UP (ref 8.5–10.1)
CHLORIDE SERPL-SCNC: 107 MMOL/L — SIGNIFICANT CHANGE UP (ref 96–108)
CO2 SERPL-SCNC: 25 MMOL/L — SIGNIFICANT CHANGE UP (ref 22–31)
CREAT SERPL-MCNC: 1.31 MG/DL — HIGH (ref 0.5–1.3)
EGFR: 64 ML/MIN/1.73M2 — SIGNIFICANT CHANGE UP
EOSINOPHIL # BLD AUTO: 0.1 K/UL — SIGNIFICANT CHANGE UP (ref 0–0.5)
EOSINOPHIL NFR BLD AUTO: 1 % — SIGNIFICANT CHANGE UP (ref 0–6)
ESTIMATED AVERAGE GLUCOSE: 143 MG/DL — HIGH (ref 68–114)
GLUCOSE BLDC GLUCOMTR-MCNC: 132 MG/DL — HIGH (ref 70–99)
GLUCOSE BLDC GLUCOMTR-MCNC: 78 MG/DL — SIGNIFICANT CHANGE UP (ref 70–99)
GLUCOSE BLDC GLUCOMTR-MCNC: 89 MG/DL — SIGNIFICANT CHANGE UP (ref 70–99)
GLUCOSE BLDC GLUCOMTR-MCNC: 98 MG/DL — SIGNIFICANT CHANGE UP (ref 70–99)
GLUCOSE BLDC GLUCOMTR-MCNC: 98 MG/DL — SIGNIFICANT CHANGE UP (ref 70–99)
GLUCOSE SERPL-MCNC: 110 MG/DL — HIGH (ref 70–99)
HCT VFR BLD CALC: 43.6 % — SIGNIFICANT CHANGE UP (ref 39–50)
HGB BLD-MCNC: 13.8 G/DL — SIGNIFICANT CHANGE UP (ref 13–17)
IMM GRANULOCYTES NFR BLD AUTO: 0.3 % — SIGNIFICANT CHANGE UP (ref 0–0.9)
LACTATE SERPL-SCNC: 2 MMOL/L — SIGNIFICANT CHANGE UP (ref 0.7–2)
LYMPHOCYTES # BLD AUTO: 3.13 K/UL — SIGNIFICANT CHANGE UP (ref 1–3.3)
LYMPHOCYTES # BLD AUTO: 31.8 % — SIGNIFICANT CHANGE UP (ref 13–44)
MAGNESIUM SERPL-MCNC: 2.2 MG/DL — SIGNIFICANT CHANGE UP (ref 1.6–2.6)
MCHC RBC-ENTMCNC: 28.5 PG — SIGNIFICANT CHANGE UP (ref 27–34)
MCHC RBC-ENTMCNC: 31.7 GM/DL — LOW (ref 32–36)
MCV RBC AUTO: 90.1 FL — SIGNIFICANT CHANGE UP (ref 80–100)
MONOCYTES # BLD AUTO: 0.87 K/UL — SIGNIFICANT CHANGE UP (ref 0–0.9)
MONOCYTES NFR BLD AUTO: 8.9 % — SIGNIFICANT CHANGE UP (ref 2–14)
NEUTROPHILS # BLD AUTO: 5.67 K/UL — SIGNIFICANT CHANGE UP (ref 1.8–7.4)
NEUTROPHILS NFR BLD AUTO: 57.7 % — SIGNIFICANT CHANGE UP (ref 43–77)
NT-PROBNP SERPL-SCNC: 1736 PG/ML — HIGH (ref 0–125)
PHOSPHATE SERPL-MCNC: 3.9 MG/DL — SIGNIFICANT CHANGE UP (ref 2.5–4.5)
PLATELET # BLD AUTO: 225 K/UL — SIGNIFICANT CHANGE UP (ref 150–400)
POTASSIUM SERPL-MCNC: 3.7 MMOL/L — SIGNIFICANT CHANGE UP (ref 3.5–5.3)
POTASSIUM SERPL-SCNC: 3.7 MMOL/L — SIGNIFICANT CHANGE UP (ref 3.5–5.3)
PROT SERPL-MCNC: 7.8 GM/DL — SIGNIFICANT CHANGE UP (ref 6–8.3)
RBC # BLD: 4.84 M/UL — SIGNIFICANT CHANGE UP (ref 4.2–5.8)
RBC # FLD: 14.8 % — HIGH (ref 10.3–14.5)
SODIUM SERPL-SCNC: 138 MMOL/L — SIGNIFICANT CHANGE UP (ref 135–145)
WBC # BLD: 9.83 K/UL — SIGNIFICANT CHANGE UP (ref 3.8–10.5)
WBC # FLD AUTO: 9.83 K/UL — SIGNIFICANT CHANGE UP (ref 3.8–10.5)

## 2024-05-03 PROCEDURE — 83735 ASSAY OF MAGNESIUM: CPT

## 2024-05-03 PROCEDURE — 93306 TTE W/DOPPLER COMPLETE: CPT | Mod: 26

## 2024-05-03 PROCEDURE — 70450 CT HEAD/BRAIN W/O DYE: CPT | Mod: MC

## 2024-05-03 PROCEDURE — 80053 COMPREHEN METABOLIC PANEL: CPT

## 2024-05-03 PROCEDURE — 82962 GLUCOSE BLOOD TEST: CPT

## 2024-05-03 PROCEDURE — 97162 PT EVAL MOD COMPLEX 30 MIN: CPT | Mod: GP

## 2024-05-03 PROCEDURE — 36415 COLL VENOUS BLD VENIPUNCTURE: CPT

## 2024-05-03 PROCEDURE — 93005 ELECTROCARDIOGRAM TRACING: CPT

## 2024-05-03 PROCEDURE — 85027 COMPLETE CBC AUTOMATED: CPT

## 2024-05-03 PROCEDURE — 84100 ASSAY OF PHOSPHORUS: CPT

## 2024-05-03 PROCEDURE — 97116 GAIT TRAINING THERAPY: CPT | Mod: GP

## 2024-05-03 RX ORDER — SPIRONOLACTONE 25 MG/1
25 TABLET, FILM COATED ORAL DAILY
Refills: 0 | Status: DISCONTINUED | OUTPATIENT
Start: 2024-05-03 | End: 2024-05-05

## 2024-05-03 RX ORDER — ATORVASTATIN CALCIUM 80 MG/1
10 TABLET, FILM COATED ORAL AT BEDTIME
Refills: 0 | Status: DISCONTINUED | OUTPATIENT
Start: 2024-05-03 | End: 2024-05-03

## 2024-05-03 RX ORDER — ACETAMINOPHEN 500 MG
650 TABLET ORAL EVERY 6 HOURS
Refills: 0 | Status: DISCONTINUED | OUTPATIENT
Start: 2024-05-03 | End: 2024-05-05

## 2024-05-03 RX ORDER — ATORVASTATIN CALCIUM 80 MG/1
10 TABLET, FILM COATED ORAL AT BEDTIME
Refills: 0 | Status: DISCONTINUED | OUTPATIENT
Start: 2024-05-03 | End: 2024-05-05

## 2024-05-03 RX ORDER — MECLIZINE HCL 12.5 MG
25 TABLET ORAL EVERY 6 HOURS
Refills: 0 | Status: DISCONTINUED | OUTPATIENT
Start: 2024-05-03 | End: 2024-05-05

## 2024-05-03 RX ORDER — INSULIN LISPRO 100/ML
VIAL (ML) SUBCUTANEOUS AT BEDTIME
Refills: 0 | Status: DISCONTINUED | OUTPATIENT
Start: 2024-05-03 | End: 2024-05-05

## 2024-05-03 RX ORDER — ATORVASTATIN CALCIUM 80 MG/1
80 TABLET, FILM COATED ORAL AT BEDTIME
Refills: 0 | Status: DISCONTINUED | OUTPATIENT
Start: 2024-05-03 | End: 2024-05-03

## 2024-05-03 RX ORDER — SODIUM CHLORIDE 9 MG/ML
1000 INJECTION, SOLUTION INTRAVENOUS
Refills: 0 | Status: DISCONTINUED | OUTPATIENT
Start: 2024-05-03 | End: 2024-05-05

## 2024-05-03 RX ORDER — LANOLIN ALCOHOL/MO/W.PET/CERES
3 CREAM (GRAM) TOPICAL AT BEDTIME
Refills: 0 | Status: DISCONTINUED | OUTPATIENT
Start: 2024-05-03 | End: 2024-05-05

## 2024-05-03 RX ORDER — DEXTROSE 50 % IN WATER 50 %
25 SYRINGE (ML) INTRAVENOUS ONCE
Refills: 0 | Status: DISCONTINUED | OUTPATIENT
Start: 2024-05-03 | End: 2024-05-05

## 2024-05-03 RX ORDER — GLUCAGON INJECTION, SOLUTION 0.5 MG/.1ML
1 INJECTION, SOLUTION SUBCUTANEOUS ONCE
Refills: 0 | Status: DISCONTINUED | OUTPATIENT
Start: 2024-05-03 | End: 2024-05-05

## 2024-05-03 RX ORDER — SACUBITRIL AND VALSARTAN 24; 26 MG/1; MG/1
1 TABLET, FILM COATED ORAL
Refills: 0 | Status: DISCONTINUED | OUTPATIENT
Start: 2024-05-03 | End: 2024-05-05

## 2024-05-03 RX ORDER — FUROSEMIDE 40 MG
40 TABLET ORAL AT BEDTIME
Refills: 0 | Status: DISCONTINUED | OUTPATIENT
Start: 2024-05-03 | End: 2024-05-05

## 2024-05-03 RX ORDER — DEXTROSE 50 % IN WATER 50 %
12.5 SYRINGE (ML) INTRAVENOUS ONCE
Refills: 0 | Status: DISCONTINUED | OUTPATIENT
Start: 2024-05-03 | End: 2024-05-05

## 2024-05-03 RX ORDER — SODIUM CHLORIDE 9 MG/ML
250 INJECTION INTRAMUSCULAR; INTRAVENOUS; SUBCUTANEOUS ONCE
Refills: 0 | Status: COMPLETED | OUTPATIENT
Start: 2024-05-03 | End: 2024-05-03

## 2024-05-03 RX ORDER — SODIUM CHLORIDE 9 MG/ML
500 INJECTION INTRAMUSCULAR; INTRAVENOUS; SUBCUTANEOUS ONCE
Refills: 0 | Status: COMPLETED | OUTPATIENT
Start: 2024-05-03 | End: 2024-05-03

## 2024-05-03 RX ORDER — ASPIRIN/CALCIUM CARB/MAGNESIUM 324 MG
81 TABLET ORAL DAILY
Refills: 0 | Status: DISCONTINUED | OUTPATIENT
Start: 2024-05-03 | End: 2024-05-05

## 2024-05-03 RX ORDER — DEXTROSE 50 % IN WATER 50 %
15 SYRINGE (ML) INTRAVENOUS ONCE
Refills: 0 | Status: DISCONTINUED | OUTPATIENT
Start: 2024-05-03 | End: 2024-05-05

## 2024-05-03 RX ORDER — AMLODIPINE BESYLATE 2.5 MG/1
5 TABLET ORAL DAILY
Refills: 0 | Status: DISCONTINUED | OUTPATIENT
Start: 2024-05-03 | End: 2024-05-05

## 2024-05-03 RX ORDER — INSULIN LISPRO 100/ML
VIAL (ML) SUBCUTANEOUS
Refills: 0 | Status: DISCONTINUED | OUTPATIENT
Start: 2024-05-03 | End: 2024-05-05

## 2024-05-03 RX ORDER — ONDANSETRON 8 MG/1
4 TABLET, FILM COATED ORAL EVERY 8 HOURS
Refills: 0 | Status: DISCONTINUED | OUTPATIENT
Start: 2024-05-03 | End: 2024-05-05

## 2024-05-03 RX ORDER — RIVAROXABAN 15 MG-20MG
20 KIT ORAL
Refills: 0 | Status: DISCONTINUED | OUTPATIENT
Start: 2024-05-03 | End: 2024-05-03

## 2024-05-03 RX ORDER — METOPROLOL TARTRATE 50 MG
50 TABLET ORAL
Refills: 0 | Status: DISCONTINUED | OUTPATIENT
Start: 2024-05-03 | End: 2024-05-05

## 2024-05-03 RX ORDER — DEXTROSE 10 % IN WATER 10 %
125 INTRAVENOUS SOLUTION INTRAVENOUS ONCE
Refills: 0 | Status: DISCONTINUED | OUTPATIENT
Start: 2024-05-03 | End: 2024-05-05

## 2024-05-03 RX ORDER — SODIUM CHLORIDE 9 MG/ML
1000 INJECTION INTRAMUSCULAR; INTRAVENOUS; SUBCUTANEOUS
Refills: 0 | Status: DISCONTINUED | OUTPATIENT
Start: 2024-05-03 | End: 2024-05-04

## 2024-05-03 RX ADMIN — Medication 40 MILLIGRAM(S): at 22:52

## 2024-05-03 RX ADMIN — Medication 81 MILLIGRAM(S): at 13:26

## 2024-05-03 RX ADMIN — ATORVASTATIN CALCIUM 10 MILLIGRAM(S): 80 TABLET, FILM COATED ORAL at 22:52

## 2024-05-03 RX ADMIN — Medication 25 MILLIGRAM(S): at 06:48

## 2024-05-03 RX ADMIN — Medication 25 MILLIGRAM(S): at 13:26

## 2024-05-03 RX ADMIN — SODIUM CHLORIDE 250 MILLILITER(S): 9 INJECTION INTRAMUSCULAR; INTRAVENOUS; SUBCUTANEOUS at 11:00

## 2024-05-03 RX ADMIN — Medication 25 MILLIGRAM(S): at 20:25

## 2024-05-03 NOTE — CONSULT NOTE ADULT - SUBJECTIVE AND OBJECTIVE BOX
Patient is a 53y old  Male who presents with a chief complaint of   ________________________________  MCarla ROWELL is a 53y year old Male with a past medical history of nonobstructive coronary disease, nonischemic cardiomyopathy with an ejection fraction of 25%, history of dual-chamber ICD status post generator change in 2011, hypertension, hyperlipidemia, paroxysmal atrial fibrillation on anticoagulation with Xarelto, functional mitral regurgitation, persistent left superior vena cava and obesity.  He states that on Wednesday of this week, he had loss of vision in his left eye which lasted seconds, when any chest discomfort or shortness of breath.  Now he presents for dizziness, described as vertigo, with nausea and diaphoresis.  He denies any chest discomfort, shortness of breath or palpitations.  He denies any syncope.  Cardiac enzymes mildly elevated.    Initial CT showed no acute abnormalities.  CTA neck showed possible fibromuscular dysplasia with mild beading appearance.    PREVIOUS CARDIAC WORKUP:    Echocardiogram 2/28/24  --There is mild left atrial dilatation (LA volume index 37 ml/m²).  --LV global wall motion is severely hypokinetic.  --LV ejection fraction (29 %) is severely decreased.  --The aortic root is mildly dilated (4.20 cm). Ascending aorta is normal in size; its diameter   is 3.70 cm (1.5 cm/m²).  --There is trace aortic regurgitation.  --There is mild to moderate mitral regurgitation.  --There is mild to moderate tricuspid regurgitation.  --There is trace pulmonic regurgitation.  --The right atrial pressure is normal (0 - 5 mm Hg). There is no pulmonary hypertension.    March 2024 Nuclear Stress test findings;  •  Several medium to large moderate to severe/severe perfusion defects noted in multiple areas, mostly worse during rest.  Findings suggestive of dilated cardiomyopathy.  •  Overall impression: Abnormal.  Findings suggestive of dilated cardiomyopathy.  •  Left ventricular perfusion is abnormal.       ________________________________  Review of systems: A 10 point review of system has been performed, and is negative except for what has been mentioned in the above history of present illness.     PAST MEDICAL & SURGICAL HISTORY:  Hypertension, unspecified type      Hyperlipidemia, unspecified hyperlipidemia type      Ventricular tachycardia      Chronic systolic congestive heart failure      Paroxysmal atrial fibrillation      Obstructive sleep apnea syndrome  No sleep device      Morbid obesity      Type 2 diabetes mellitus without complication, without long-term current use of insulin      ASD (atrial septal defect)  history of . repaired as a child      AICD (automatic cardioverter/defibrillator) present      AICD (automatic cardioverter/defibrillator) present  5/2011      H/O heart surgery  ASD repair at 3 years old        FAMILY HISTORY:  Family history of heart disease (Father)      SOCIAL HISTORY: Noncontributory    ALLERGIES:  No Known Allergies    Home Medications:  amLODIPine 5 mg oral tablet: 1 tab(s) orally once a day (02 May 2024 22:54)  atorvastatin 10 mg oral tablet: 1 tab(s) orally once a day before dinner (02 May 2024 22:52)  Entresto 49 mg-51 mg oral tablet: 1 tab(s) orally 2 times a day (02 May 2024 22:54)  furosemide 40 mg oral tablet: 1 tab(s) orally once a day before dinner (02 May 2024 22:53)  metFORMIN 500 mg oral tablet: 1 tab(s) orally 2 times a day (02 May 2024 22:54)  metoprolol tartrate 50 mg oral tablet: 1 tab(s) orally 2 times a day (02 May 2024 22:54)  spironolactone 25 mg oral tablet: 1 tab(s) orally once a day before dinner (02 May 2024 22:53)  Xarelto 20 mg oral tablet: 1 tab(s) orally once a day (in the evening) (02 May 2024 22:54)    MEDICATIONS  (STANDING):  amLODIPine   Tablet 5 milliGRAM(s) Oral daily  aspirin enteric coated 81 milliGRAM(s) Oral daily  atorvastatin 10 milliGRAM(s) Oral at bedtime  dextrose 10% Bolus 125 milliLiter(s) IV Bolus once  dextrose 5%. 1000 milliLiter(s) (50 mL/Hr) IV Continuous <Continuous>  dextrose 5%. 1000 milliLiter(s) (100 mL/Hr) IV Continuous <Continuous>  dextrose 50% Injectable 25 Gram(s) IV Push once  dextrose 50% Injectable 12.5 Gram(s) IV Push once  furosemide    Tablet 40 milliGRAM(s) Oral at bedtime  glucagon  Injectable 1 milliGRAM(s) IntraMuscular once  insulin lispro (ADMELOG) corrective regimen sliding scale   SubCutaneous at bedtime  insulin lispro (ADMELOG) corrective regimen sliding scale   SubCutaneous three times a day before meals  meclizine 25 milliGRAM(s) Oral every 6 hours  metoprolol tartrate 50 milliGRAM(s) Oral two times a day  sacubitril 49 mG/valsartan 51 mG 1 Tablet(s) Oral two times a day  sodium chloride 0.9%. 1000 milliLiter(s) (50 mL/Hr) IV Continuous <Continuous>  spironolactone 25 milliGRAM(s) Oral daily    MEDICATIONS  (PRN):  acetaminophen     Tablet .. 650 milliGRAM(s) Oral every 6 hours PRN Temp greater or equal to 38C (100.4F), Mild Pain (1 - 3)  aluminum hydroxide/magnesium hydroxide/simethicone Suspension 30 milliLiter(s) Oral every 4 hours PRN Dyspepsia  dextrose Oral Gel 15 Gram(s) Oral once PRN Blood Glucose LESS THAN 70 milliGRAM(s)/deciliter  melatonin 3 milliGRAM(s) Oral at bedtime PRN Insomnia  ondansetron Injectable 4 milliGRAM(s) IV Push every 8 hours PRN Nausea and/or Vomiting    Vital Signs Last 24 Hrs  T(C): 37.1 (03 May 2024 14:22), Max: 37.1 (03 May 2024 14:22)  T(F): 98.8 (03 May 2024 14:22), Max: 98.8 (03 May 2024 14:22)  HR: 64 (03 May 2024 14:22) (60 - 87)  BP: 121/78 (03 May 2024 14:22) (84/65 - 121/78)  BP(mean): 87 (03 May 2024 14:22) (66 - 87)  RR: 17 (03 May 2024 14:22) (16 - 18)  SpO2: 99% (03 May 2024 14:22) (95% - 99%)    Parameters below as of 03 May 2024 14:22  Patient On (Oxygen Delivery Method): room air      I&O's Summary    ________________________________  GENERAL APPEARANCE:  No acute distress, obese  HEAD: normocephalic, atraumatic  NECK: supple, no jugular venous distention, no carotid bruit    HEART: Regular rate and rhythm, S1, S2 normal, 1/6 murmur    CHEST:  No anterior chest wall tenderness    LUNGS:  Clear to auscultation, without any wheezing, rhonchi or rales    ABDOMEN: soft, nontender, nondistended, with positive bowel sounds appreciated  EXTREMITIES: no edema.   NEURO: Alert and oriented x3  PSYC:  Normal affect  SKIN:  Dry  ________________________________   TELEMETRY:   ECG:Underlying atrial fibrillation with ventricular pacing at 67 bpm.      LABS:                        13.8   9.83  )-----------( 225      ( 03 May 2024 07:05 )             43.6             05-03    138  |  107  |  27<H>  ----------------------------<  110<H>  3.7   |  25  |  1.31<H>    Ca    9.1      03 May 2024 07:05  Phos  3.9     05-03  Mg     2.2     05-03    TPro  7.8  /  Alb  3.7  /  TBili  0.5  /  DBili  x   /  AST  9<L>  /  ALT  17  /  AlkPhos  40  05-03      LIVER FUNCTIONS - ( 03 May 2024 07:05 )  Alb: 3.7 g/dL / Pro: 7.8 gm/dL / ALK PHOS: 40 U/L / ALT: 17 U/L / AST: 9 U/L / GGT: x         PT/INR - ( 02 May 2024 18:50 )   PT: 16.1 sec;   INR: 1.44 ratio         PTT - ( 02 May 2024 18:50 )  PTT:34.6 sec  Urinalysis Basic - ( 03 May 2024 07:05 )    Color: x / Appearance: x / SG: x / pH: x  Gluc: 110 mg/dL / Ketone: x  / Bili: x / Urobili: x   Blood: x / Protein: x / Nitrite: x   Leuk Esterase: x / RBC: x / WBC x   Sq Epi: x / Non Sq Epi: x / Bacteria: x        PT/INR - ( 02 May 2024 18:50 )   PT: 16.1 sec;   INR: 1.44 ratio         PTT - ( 02 May 2024 18:50 )  PTT:34.6 sec  Urinalysis Basic - ( 03 May 2024 07:05 )    Color: x / Appearance: x / SG: x / pH: x  Gluc: 110 mg/dL / Ketone: x  / Bili: x / Urobili: x   Blood: x / Protein: x / Nitrite: x   Leuk Esterase: x / RBC: x / WBC x   Sq Epi: x / Non Sq Epi: x / Bacteria: x       ________________________________    RADIOLOGY & ADDITIONAL STUDIES:   IMPRESSION:    No acute intracranial intracranial hemorrhage, midline shift or mass   effect.    Mild white matter small vessel ischemic disease.      IMPRESSION: Clear lungs presently seen. Again noted is unusual course of   left-sided pacemaker wires.  IMPRESSION:    NONCONTRAST HEAD CT:  1.  No acute intracranial abnormality.    CTA HEAD AND NECK:  1.   CTA HEAD: No large vessel occlusion, aneurysm, or hemodynamically   significant stenosis. Sinusdisease.  2.   CTA NECK: No occlusion, dissection, aneurysm, or hemodynamically   significant stenosis. Findings suggestive of fibromuscular dysplasia of   the cervical ICA bilaterally.  Right Carotid: The right common carotid artery is patent without   stenosis. The right internal carotid artery is patent without stenosis.   Mild beaded appearance of the high right cervical ICA.    Left Carotid: The left common carotid arteryis patent without stenosis.   The left internal carotid artery is patent without stenosis. Mild beaded   appearance of the high left cervical ICA    ________________________________    ASSESSMENT:  Nonischemic cardiomyopathy status post dual-chamber ICD  Chronic systolic heart failure  Dizziness rule out TIA  Possible fibromuscular dysplasia cervical internal carotid arteries  Hypertension  Hyperlipidemia  Obesity  Sleep apnea  Demand ischemia    PLAN:  Elevated cardiac enzymes likely due to demand ischemia.  No cardiac contraindication to resuming anticoagulation.  If infarct on MRI, switch to Eliquis.  Continue aspirin.  Continue statin.  Appears compensated from a heart failure standpoint.  Continue Lasix.  Continue Aldactone.  Continue metoprolol.  Continue Entresto.        ____________________________________________  (Dragon Dictation software used). Thank you for allowing me to participate in the care of your patient. Please contact me should any questions arise.    MELVA Nascimento DO, FACC  Office: [phone]      Patient is a 53y old  Male who presents with a chief complaint of   ________________________________  MCarla ROWELL is a 53y year old Male with a past medical history of nonobstructive coronary disease, nonischemic cardiomyopathy with an ejection fraction of 25%, history of dual-chamber ICD status post generator change in 2011, hypertension, hyperlipidemia, paroxysmal atrial fibrillation on anticoagulation with Xarelto, functional mitral regurgitation, persistent left superior vena cava and obesity.  He states that on Wednesday of this week, he had loss of vision in his left eye which lasted seconds, when any chest discomfort or shortness of breath.  Now he presents for dizziness, described as vertigo, with nausea and diaphoresis.  He denies any chest discomfort, shortness of breath or palpitations.  He denies any syncope.  Cardiac enzymes mildly elevated.    Initial CT showed no acute abnormalities.  CTA neck showed possible fibromuscular dysplasia with mild beading appearance.    PREVIOUS CARDIAC WORKUP:    Echocardiogram 2/28/24  --There is mild left atrial dilatation (LA volume index 37 ml/m²).  --LV global wall motion is severely hypokinetic.  --LV ejection fraction (29 %) is severely decreased.  --The aortic root is mildly dilated (4.20 cm). Ascending aorta is normal in size; its diameter   is 3.70 cm (1.5 cm/m²).  --There is trace aortic regurgitation.  --There is mild to moderate mitral regurgitation.  --There is mild to moderate tricuspid regurgitation.  --There is trace pulmonic regurgitation.  --The right atrial pressure is normal (0 - 5 mm Hg). There is no pulmonary hypertension.    March 2024 Nuclear Stress test findings;  •  Several medium to large moderate to severe/severe perfusion defects noted in multiple areas, mostly worse during rest.  Findings suggestive of dilated cardiomyopathy.  •  Overall impression: Abnormal.  Findings suggestive of dilated cardiomyopathy.  •  Left ventricular perfusion is abnormal.       ________________________________  Review of systems: A 10 point review of system has been performed, and is negative except for what has been mentioned in the above history of present illness.     PAST MEDICAL & SURGICAL HISTORY:  Hypertension, unspecified type      Hyperlipidemia, unspecified hyperlipidemia type      Ventricular tachycardia      Chronic systolic congestive heart failure      Paroxysmal atrial fibrillation      Obstructive sleep apnea syndrome  No sleep device      Morbid obesity      Type 2 diabetes mellitus without complication, without long-term current use of insulin      ASD (atrial septal defect)  history of . repaired as a child      AICD (automatic cardioverter/defibrillator) present      AICD (automatic cardioverter/defibrillator) present  5/2011      H/O heart surgery  ASD repair at 3 years old        FAMILY HISTORY:  Family history of heart disease (Father)      SOCIAL HISTORY: Noncontributory    ALLERGIES:  No Known Allergies    Home Medications:  amLODIPine 5 mg oral tablet: 1 tab(s) orally once a day (02 May 2024 22:54)  atorvastatin 10 mg oral tablet: 1 tab(s) orally once a day before dinner (02 May 2024 22:52)  Entresto 49 mg-51 mg oral tablet: 1 tab(s) orally 2 times a day (02 May 2024 22:54)  furosemide 40 mg oral tablet: 1 tab(s) orally once a day before dinner (02 May 2024 22:53)  metFORMIN 500 mg oral tablet: 1 tab(s) orally 2 times a day (02 May 2024 22:54)  metoprolol tartrate 50 mg oral tablet: 1 tab(s) orally 2 times a day (02 May 2024 22:54)  spironolactone 25 mg oral tablet: 1 tab(s) orally once a day before dinner (02 May 2024 22:53)  Xarelto 20 mg oral tablet: 1 tab(s) orally once a day (in the evening) (02 May 2024 22:54)    MEDICATIONS  (STANDING):  amLODIPine   Tablet 5 milliGRAM(s) Oral daily  aspirin enteric coated 81 milliGRAM(s) Oral daily  atorvastatin 10 milliGRAM(s) Oral at bedtime  dextrose 10% Bolus 125 milliLiter(s) IV Bolus once  dextrose 5%. 1000 milliLiter(s) (50 mL/Hr) IV Continuous <Continuous>  dextrose 5%. 1000 milliLiter(s) (100 mL/Hr) IV Continuous <Continuous>  dextrose 50% Injectable 25 Gram(s) IV Push once  dextrose 50% Injectable 12.5 Gram(s) IV Push once  furosemide    Tablet 40 milliGRAM(s) Oral at bedtime  glucagon  Injectable 1 milliGRAM(s) IntraMuscular once  insulin lispro (ADMELOG) corrective regimen sliding scale   SubCutaneous at bedtime  insulin lispro (ADMELOG) corrective regimen sliding scale   SubCutaneous three times a day before meals  meclizine 25 milliGRAM(s) Oral every 6 hours  metoprolol tartrate 50 milliGRAM(s) Oral two times a day  sacubitril 49 mG/valsartan 51 mG 1 Tablet(s) Oral two times a day  sodium chloride 0.9%. 1000 milliLiter(s) (50 mL/Hr) IV Continuous <Continuous>  spironolactone 25 milliGRAM(s) Oral daily    MEDICATIONS  (PRN):  acetaminophen     Tablet .. 650 milliGRAM(s) Oral every 6 hours PRN Temp greater or equal to 38C (100.4F), Mild Pain (1 - 3)  aluminum hydroxide/magnesium hydroxide/simethicone Suspension 30 milliLiter(s) Oral every 4 hours PRN Dyspepsia  dextrose Oral Gel 15 Gram(s) Oral once PRN Blood Glucose LESS THAN 70 milliGRAM(s)/deciliter  melatonin 3 milliGRAM(s) Oral at bedtime PRN Insomnia  ondansetron Injectable 4 milliGRAM(s) IV Push every 8 hours PRN Nausea and/or Vomiting    Vital Signs Last 24 Hrs  T(C): 37.1 (03 May 2024 14:22), Max: 37.1 (03 May 2024 14:22)  T(F): 98.8 (03 May 2024 14:22), Max: 98.8 (03 May 2024 14:22)  HR: 64 (03 May 2024 14:22) (60 - 87)  BP: 121/78 (03 May 2024 14:22) (84/65 - 121/78)  BP(mean): 87 (03 May 2024 14:22) (66 - 87)  RR: 17 (03 May 2024 14:22) (16 - 18)  SpO2: 99% (03 May 2024 14:22) (95% - 99%)    Parameters below as of 03 May 2024 14:22  Patient On (Oxygen Delivery Method): room air      I&O's Summary    ________________________________  GENERAL APPEARANCE:  No acute distress, obese  HEAD: normocephalic, atraumatic  NECK: supple, no jugular venous distention, no carotid bruit    HEART: Regular rate and rhythm, S1, S2 normal, 1/6 murmur    CHEST:  No anterior chest wall tenderness    LUNGS:  Clear to auscultation, without any wheezing, rhonchi or rales    ABDOMEN: soft, nontender, nondistended, with positive bowel sounds appreciated  EXTREMITIES: no edema.   NEURO: Alert and oriented x3  PSYC:  Normal affect  SKIN:  Dry  ________________________________   TELEMETRY:   ECG:Underlying atrial fibrillation with ventricular pacing at 67 bpm.      LABS:                        13.8   9.83  )-----------( 225      ( 03 May 2024 07:05 )             43.6             05-03    138  |  107  |  27<H>  ----------------------------<  110<H>  3.7   |  25  |  1.31<H>    Ca    9.1      03 May 2024 07:05  Phos  3.9     05-03  Mg     2.2     05-03    TPro  7.8  /  Alb  3.7  /  TBili  0.5  /  DBili  x   /  AST  9<L>  /  ALT  17  /  AlkPhos  40  05-03      LIVER FUNCTIONS - ( 03 May 2024 07:05 )  Alb: 3.7 g/dL / Pro: 7.8 gm/dL / ALK PHOS: 40 U/L / ALT: 17 U/L / AST: 9 U/L / GGT: x         PT/INR - ( 02 May 2024 18:50 )   PT: 16.1 sec;   INR: 1.44 ratio         PTT - ( 02 May 2024 18:50 )  PTT:34.6 sec  Urinalysis Basic - ( 03 May 2024 07:05 )    Color: x / Appearance: x / SG: x / pH: x  Gluc: 110 mg/dL / Ketone: x  / Bili: x / Urobili: x   Blood: x / Protein: x / Nitrite: x   Leuk Esterase: x / RBC: x / WBC x   Sq Epi: x / Non Sq Epi: x / Bacteria: x        PT/INR - ( 02 May 2024 18:50 )   PT: 16.1 sec;   INR: 1.44 ratio         PTT - ( 02 May 2024 18:50 )  PTT:34.6 sec  Urinalysis Basic - ( 03 May 2024 07:05 )    Color: x / Appearance: x / SG: x / pH: x  Gluc: 110 mg/dL / Ketone: x  / Bili: x / Urobili: x   Blood: x / Protein: x / Nitrite: x   Leuk Esterase: x / RBC: x / WBC x   Sq Epi: x / Non Sq Epi: x / Bacteria: x       ________________________________    RADIOLOGY & ADDITIONAL STUDIES:   IMPRESSION:    No acute intracranial intracranial hemorrhage, midline shift or mass   effect.    Mild white matter small vessel ischemic disease.      IMPRESSION: Clear lungs presently seen. Again noted is unusual course of   left-sided pacemaker wires.  IMPRESSION:    NONCONTRAST HEAD CT:  1.  No acute intracranial abnormality.    CTA HEAD AND NECK:  1.   CTA HEAD: No large vessel occlusion, aneurysm, or hemodynamically   significant stenosis. Sinusdisease.  2.   CTA NECK: No occlusion, dissection, aneurysm, or hemodynamically   significant stenosis. Findings suggestive of fibromuscular dysplasia of   the cervical ICA bilaterally.  Right Carotid: The right common carotid artery is patent without   stenosis. The right internal carotid artery is patent without stenosis.   Mild beaded appearance of the high right cervical ICA.    Left Carotid: The left common carotid arteryis patent without stenosis.   The left internal carotid artery is patent without stenosis. Mild beaded   appearance of the high left cervical ICA    ________________________________    ASSESSMENT:  Nonischemic cardiomyopathy status post dual-chamber ICD  Chronic systolic heart failure  Dizziness rule out TIA  Possible fibromuscular dysplasia cervical internal carotid arteries  Hypertension  Hyperlipidemia  Obesity  Sleep apnea  Demand ischemia    PLAN:  Elevated cardiac enzymes likely due to demand ischemia.  No cardiac contraindication to resuming anticoagulation.  If infarct on MRI, switch to Eliquis.  Continue aspirin.  Continue statin.  Appears compensated from a heart failure standpoint.  Continue Lasix.  Continue Aldactone.  Continue metoprolol.  Continue Entresto.  For fibromuscular dysplasia, medical therapy with antiplatelet and pressure control. Out patient vasc surg follow up.      ____________________________________________  (Dragon Dictation software used). Thank you for allowing me to participate in the care of your patient. Please contact me should any questions arise.    MELVA Nascimento DO, [facility]  Office: [phone]

## 2024-05-03 NOTE — ED ADULT NURSE REASSESSMENT NOTE - NS ED NURSE REASSESS COMMENT FT1
LOYDA Argueta received report from LOYDA Webb and assumed care. Pt is sitting up, appears well,  and has no complaints at this time.
Pt requesting night time medications. List given to MD Contino. Awaiting orders.
pt educted on POC awaiting bed assignment. Admitted for obs due to elevated trop. Pt has no complaints at this time. VS stable.
pt reports feeling better after medication, VS stable, no distress noted. Troponin to be repeated.
Pt report received from Effie SCALES. Pt contact made. Pt has no new complaints at this time. Pt is Aox4 and speaking in full sentences. VSS, bed locked in lowest position, side rails up, call bell within reach. Breafeast menu provided.

## 2024-05-03 NOTE — CONSULT NOTE ADULT - NS ATTEND AMEND GEN_ALL_CORE FT
53 year old man with multiple risk factors for stroke, presenting with vertigo.  Vertigo:  -Possibly peripheral vertigo. Symptoms have resolved. However, in light of multiple risk factors, would get MRI brain or (if unable to because of AICD with older leads) repeat head CT on 5/4.   -Meclizine if symptoms recur    Possible TIA:  -? of TIA last week  -Addition of aspirin 81 mg/day  -Titrate statin    Fibromuscular dysplasia:  -Xarelto + aspirin  -Management of HTN  -Statin  -f/u with cardiology    Dr. Cardona will cover on 5/4 and 5/5 and f/u as needed

## 2024-05-03 NOTE — H&P ADULT - NSHPPHYSICALEXAM_GEN_ALL_CORE
T(C): 36.7 (05-02-24 @ 21:31), Max: 36.7 (05-02-24 @ 21:31)  HR: 65 (05-02-24 @ 21:31) (65 - 79)  BP: 106/70 (05-02-24 @ 21:31) (103/66 - 106/70)  RR: 17 (05-02-24 @ 21:31) (17 - 18)  SpO2: 99% (05-02-24 @ 21:31) (99% - 99%)    General: non-toxic  HEENT: non-traumatic, perrla, eomi  Cardio: s1s2 regular rate and rhythm  Lungs: comfortable breathing, clear to auscultation  Abdomen: Soft, non-tender, non-distended  Neuro: AOx4  Ext: Pulses +2

## 2024-05-03 NOTE — H&P ADULT - ASSESSMENT
53M admitted for NSTEMI.    NSTEMI  -Trops down trending.   -Obtain serial trops and ECG.  -Increase atorvastatin from 10 to 80.  -Hold xarelto and start Hep gtt, pending cardio eval for possible cath.   -C/w BB, amlodipine, entresto.  -Cardiology consult placed.     DVT ppx: Hep Gtt.        53M admitted for NSTEMI.    NSTEMI  -Trops down trending.   -Obtain serial trops and ECG.  -Increase atorvastatin from 10 to 80.  -Last xarelto on 5/1/24  -C/w BB, amlodipine, entresto.  -Cardiology consult placed.     Vertigo   -R/o stroke, recent TIA last week.   -PT evaluation  -PRN meclizine  -Neurology consult  -Fall precautions    Obesity  -Weight loss counseling.     DVT ppx: SCDs.       53M admitted for NSTEMI.    NSTEMI vs demand ischemia  -Denies CP and Trops down trending.   -Obtain serial trops and ECG.  -Increase atorvastatin from 10 to 80.  -Last xarelto on 5/1/24, hold xarelto until further cardio recs.   -C/w BB, amlodipine, entresto.  -Cardiology consult placed - Janey    Vertigo   -R/o posterior circulation stroke, recent TIA last week.   -Neuro exam non focal.   -Ordered MRI, unsure if compatible with MRI, f/u with radiology in am.   -PT evaluation  -PRN meclizine  -Neurology consult placed  -Fall precautions    Other chronic problems:    Chronic systolic HF s/p AICD / Afib s/p cardioversion / nonischemic CMP / HTN / HLD / TRIP  -Reports compliance with meds.   -Appears compensated.   -C/w home meds  -Ins and outs and daily weights.   -Cardiology consult     Obesity  -Weight loss counseling.     DVT ppx: SCDs  Fall precautions.        53M admitted for NSTEMI.    NSTEMI vs demand ischemia  -Denies CP and Trops down trending.   -Obtain serial trops and ECG.  -Increase atorvastatin from 10 to 80.  -Last xarelto on 5/1/24, hold xarelto until further cardio recs.   -C/w BB, amlodipine, entresto.  -Cardiology consult placed - Janey    Vertigo   -R/o posterior circulation stroke, recent TIA last week.   -Neuro exam non focal.   -Ordered MRI, unsure if compatible with MRI, f/u with radiology in am.   -PT evaluation  -PRN meclizine  -Neurology consult placed  -Fall precautions    DM  -Mod-SF, w/ bedtime coverage.  -Hypoglycemia protocol.    Other chronic problems:    Chronic systolic HF s/p AICD / Afib s/p cardioversion / nonischemic CMP / HTN / HLD / TRIP  -Reports compliance with meds.   -Appears compensated.   -C/w home meds  -Ins and outs and daily weights.   -Cardiology consult     Obesity  -Weight loss counseling.     DVT ppx: SCDs  Fall precautions.

## 2024-05-03 NOTE — H&P ADULT - HISTORY OF PRESENT ILLNESS
53M was at home today when he developed sudden onset dizziness/spinning and generalized HA at4:30. Pt felt nauseous and sweaty. No CP/SOB/vision changes/AMS. Pt on Xarelto for Afib. PMD Colleen. Edgard Toth. EPS Felecia.    In ED patient with stable vitals, CBC unremarkable, CMP unremarkable, Trops 269 > 257, CTA head and neck done in ED, noted below. Admitted for cardiac evaluation.  53M was at home today when he developed sudden onset dizziness/spinning and generalized HA at4:30. Pt felt nauseous and sweaty. Patient reports he was sitting and talking to his significant other and he suddenly started to feel his "head spin, the room and everything". He felt nauseaus, diaphoresis. Reports he wasnt able to get out of his chair. The sensation lasted until he got to ED and received meclizine. Reports he had a TIA last Wednesday No CP/SOB/vision changes/AMS. Pt on Xarelto for Afib. PMD Strogach. Card Janey. EPS Felecia.    In ED patient with stable vitals, CBC unremarkable, CMP unremarkable, Trops 269 > 257, CTA head and neck done in ED, noted below. Admitted for cardiac evaluation.  53M was at home today when he developed sudden onset dizziness/spinning and generalized HA at4:30. Pt felt nauseous and sweaty. Patient reports he had been in his usual state of health, he was sitting and talking to his significant other and he suddenly started to feel his "head spin, the room and everything" associated with headache. He felt nauseas, and became diaphoretic. Reports he wasn't able to get out of his chair, and the significant other called 911. The sensation lasted until he got to ED and received meclizine. Reports he had a TIA last Wednesday. Denies CP, dyspnea, blurry vision, diplopia, AMS. Pt on Xarelto for Afib and last took dose on 5/1/24.   In ED patient with stable vitals, CBC unremarkable, CMP unremarkable, Trops 269 > 257, CTA head and neck done in ED, noted below. Admitted for cardiac evaluation.

## 2024-05-03 NOTE — CONSULT NOTE ADULT - SUBJECTIVE AND OBJECTIVE BOX
CC: TIA    HPI:  53M with h/o TIA 2006 (p/w dysarthria and one sided numbness), HTN, HLD, TRIP non-compliant with CPAP, V-Tach s/p ICD, DM, AFib on Xarelto p/w sudden onset dizziness/spinning and generalized HA, nausea, sweats while talking to his significant other. Reports he wasn't able to get out of his chair, and his significant other called 911. Sx's resolved after receiving meclizine/zofran in the ED CTA H&N was neg for acute ischemia with findings suggestive of fibromuscular dysplasia of the cervical ICA bilaterally.  Found to have +NSTEMI.  Pt. is admitted for TIA/CVA/NSTEMI w/u.  Last week he had an episode of lightheadedness and scotoma while driving, pulled over and closed each eye and in the L eye lost half of his vision for a few seconds, then all sx's resolved and he continued driving.  He did not go to the hospital.  Then on Monday he had a follow up apt with his cardiologist Dr. Toth and was told to come to the ED for TIA.  CT head on 4/29 was neg for acute findings and he was advised to follow up with a neurologist.  Denies dysarthria, diplopia, focal weakness/numbness, unsteady gait, facial droop.    PAST MEDICAL & SURGICAL HISTORY:  Hypertension, unspecified type      Hyperlipidemia, unspecified hyperlipidemia type      Ventricular tachycardia      Chronic systolic congestive heart failure      Paroxysmal atrial fibrillation      Obstructive sleep apnea syndrome  No sleep device      Morbid obesity      Type 2 diabetes mellitus without complication, without long-term current use of insulin      ASD (atrial septal defect)  history of . repaired as a child      AICD (automatic cardioverter/defibrillator) present      AICD (automatic cardioverter/defibrillator) present  5/2011      H/O heart surgery  ASD repair at 3 years old          FAMILY HISTORY:  Family history of heart disease (Father)        Social Hx:  Nonsmoker, no drug or alcohol use    MEDICATIONS  (STANDING):  amLODIPine   Tablet 5 milliGRAM(s) Oral daily  atorvastatin 10 milliGRAM(s) Oral at bedtime  dextrose 10% Bolus 125 milliLiter(s) IV Bolus once  dextrose 5%. 1000 milliLiter(s) (100 mL/Hr) IV Continuous <Continuous>  dextrose 5%. 1000 milliLiter(s) (50 mL/Hr) IV Continuous <Continuous>  dextrose 50% Injectable 25 Gram(s) IV Push once  dextrose 50% Injectable 12.5 Gram(s) IV Push once  furosemide    Tablet 40 milliGRAM(s) Oral at bedtime  glucagon  Injectable 1 milliGRAM(s) IntraMuscular once  insulin lispro (ADMELOG) corrective regimen sliding scale   SubCutaneous at bedtime  insulin lispro (ADMELOG) corrective regimen sliding scale   SubCutaneous three times a day before meals  meclizine 25 milliGRAM(s) Oral every 6 hours  metoprolol tartrate 50 milliGRAM(s) Oral two times a day  sacubitril 49 mG/valsartan 51 mG 1 Tablet(s) Oral two times a day  sodium chloride 0.9% Bolus 500 milliLiter(s) IV Bolus once  sodium chloride 0.9%. 1000 milliLiter(s) (50 mL/Hr) IV Continuous <Continuous>  spironolactone 25 milliGRAM(s) Oral daily       Allergies  No Known Allergies        ROS: Pertinent positives in HPI, all other ROS were reviewed and are negative.      Vital Signs Last 24 Hrs  T(C): 36.6 (03 May 2024 06:51), Max: 36.7 (02 May 2024 21:31)  T(F): 97.9 (03 May 2024 06:51), Max: 98.1 (02 May 2024 21:31)  HR: 60 (03 May 2024 06:51) (60 - 79)  BP: 95/65 (03 May 2024 09:08) (90/57 - 106/70)  BP(mean): 66 (03 May 2024 09:08) (66 - 75)  RR: 17 (03 May 2024 06:51) (16 - 18)  SpO2: 99% (03 May 2024 06:51) (98% - 99%)        Constitutional: awake, NAD  HEAD: Normocephalic  Neck: Supple.  Extremities:  no edema  Musculoskeletal: no abnormal movements  Skin: No rashes    Neurological exam:  HF: A x O x 3. Appropriately interactive, normal affect. Speech fluent, No Aphasia or paraphasic errors. Naming /repetition intact   CN: 3mm-2mm, MU, EOMI, VFF, no nystagmus, facial sensation normal, no NLFD, tongue midline, Palate moves equally, SCM equal bilaterally  Motor: No pronator drift, Strength 5/5 in all 4 ext, normal bulk and tone, no tremor, rigidity or bradykinesia.    Sens: Intact to light touch / PP/ VS/ JS    Reflexes: Symmetric and normal . BJ 2+, BR 2+, KJ trace+, AJ trace+, downgoing toes b/l  Coord:  No FNFA, HTS intact b/l, CHRISTINE intact   Gait/Balance: Normal, neg Romberg's    NIHSS: 0          Labs:   05-03    138  |  107  |  27<H>  ----------------------------<  110<H>  3.7   |  25  |  1.31<H>    Ca    9.1      03 May 2024 07:05  Phos  3.9     05-03  Mg     2.2     05-03    TPro  7.8  /  Alb  3.7  /  TBili  0.5  /  DBili  x   /  AST  9<L>  /  ALT  17  /  AlkPhos  40  05-03                              13.8   9.83  )-----------( 225      ( 03 May 2024 07:05 )             43.6       Radiology:  < from: CT Angio Neck w/ IV Cont (05.02.24 @ 19:24) >  IMPRESSION:    NONCONTRAST HEAD CT:  1.  No acute intracranial abnormality.    CTA HEAD AND NECK:  1.   CTA HEAD: No large vessel occlusion, aneurysm, or hemodynamically   significant stenosis. Sinusdisease.  2.   CTA NECK: No occlusion, dissection, aneurysm, or hemodynamically   significant stenosis. Findings suggestive of fibromuscular dysplasia of   the cervical ICA bilaterally.    < from: CT Head No Cont (04.29.24 @ 16:11) >    IMPRESSION:    No acute intracranial intracranial hemorrhage, midline shift or mass   effect.    Mild white matter small vessel ischemic disease.               CC: TIA    HPI:  53M with h/o TIA 2006 (p/w dysarthria and one sided numbness), HTN, HLD, TRIP non-compliant with CPAP, V-Tach s/p ICD, DM, AFib on Xarelto p/w sudden onset dizziness/spinning and generalized HA, nausea, sweats while talking to his significant other. Reports he wasn't able to get out of his chair, and his significant other called 911. Sx's resolved after receiving meclizine/zofran in the E.  CTA H&N was neg for acute ischemia with findings suggestive of fibromuscular dysplasia of the cervical ICA bilaterally.  Found to have +NSTEMI.  Pt. is admitted for TIA/CVA/NSTEMI w/u.  Last week he had an episode of lightheadedness and scotoma while driving, pulled over and closed each eye and in the L eye lost half of his vision for a few seconds, then all sx's resolved and he continued driving.  He did not go to the hospital.  Then on Monday he had a follow up apt with his cardiologist Dr. Toth and was told to come to the ED for TIA.  CT head on 4/29 was neg for acute findings and he was advised to follow up with a neurologist.  Denies dysarthria, diplopia, focal weakness/numbness, unsteady gait, facial droop.    PAST MEDICAL & SURGICAL HISTORY:  Hypertension, unspecified type    Hyperlipidemia, unspecified hyperlipidemia type    Ventricular tachycardia    Chronic systolic congestive heart failure    Paroxysmal atrial fibrillation    Obstructive sleep apnea syndrome  No sleep device      Morbid obesity      Type 2 diabetes mellitus without complication, without long-term current use of insulin      ASD (atrial septal defect)  history of . repaired as a child      AICD (automatic cardioverter/defibrillator) present      AICD (automatic cardioverter/defibrillator) present  5/2011      H/O heart surgery  ASD repair at 3 years old          FAMILY HISTORY:  Family history of heart disease (Father)        Social Hx:  Nonsmoker, no drug or alcohol use    MEDICATIONS  (STANDING):  amLODIPine   Tablet 5 milliGRAM(s) Oral daily  atorvastatin 10 milliGRAM(s) Oral at bedtime  dextrose 10% Bolus 125 milliLiter(s) IV Bolus once  dextrose 5%. 1000 milliLiter(s) (100 mL/Hr) IV Continuous <Continuous>  dextrose 5%. 1000 milliLiter(s) (50 mL/Hr) IV Continuous <Continuous>  dextrose 50% Injectable 25 Gram(s) IV Push once  dextrose 50% Injectable 12.5 Gram(s) IV Push once  furosemide    Tablet 40 milliGRAM(s) Oral at bedtime  glucagon  Injectable 1 milliGRAM(s) IntraMuscular once  insulin lispro (ADMELOG) corrective regimen sliding scale   SubCutaneous at bedtime  insulin lispro (ADMELOG) corrective regimen sliding scale   SubCutaneous three times a day before meals  meclizine 25 milliGRAM(s) Oral every 6 hours  metoprolol tartrate 50 milliGRAM(s) Oral two times a day  sacubitril 49 mG/valsartan 51 mG 1 Tablet(s) Oral two times a day  sodium chloride 0.9% Bolus 500 milliLiter(s) IV Bolus once  sodium chloride 0.9%. 1000 milliLiter(s) (50 mL/Hr) IV Continuous <Continuous>  spironolactone 25 milliGRAM(s) Oral daily       Allergies  No Known Allergies        ROS: Pertinent positives in HPI, all other ROS were reviewed and are negative.      Vital Signs Last 24 Hrs  T(C): 36.6 (03 May 2024 06:51), Max: 36.7 (02 May 2024 21:31)  T(F): 97.9 (03 May 2024 06:51), Max: 98.1 (02 May 2024 21:31)  HR: 60 (03 May 2024 06:51) (60 - 79)  BP: 95/65 (03 May 2024 09:08) (90/57 - 106/70)  BP(mean): 66 (03 May 2024 09:08) (66 - 75)  RR: 17 (03 May 2024 06:51) (16 - 18)  SpO2: 99% (03 May 2024 06:51) (98% - 99%)        Constitutional: awake, NAD  HEAD: Normocephalic  Neck: Supple.  Extremities:  no edema  Musculoskeletal: no abnormal movements  Skin: No rashes    Neurological exam:  HF: A x O x 3. Appropriately interactive, normal affect. Speech fluent, No Aphasia or paraphasic errors. Naming /repetition intact   CN: 3mm-2mm, MU, EOMI, VFF, no nystagmus, facial sensation normal, no NLFD, tongue midline, Palate moves equally, SCM equal bilaterally  Motor: No pronator drift, Strength 5/5 in all 4 ext, normal bulk and tone, no tremor, rigidity or bradykinesia.    Sens: Intact to light touch / PP/ VS/ JS    Reflexes: Symmetric and normal . BJ 2+, BR 2+, KJ trace+, AJ trace+, downgoing toes b/l  Coord:  No FNFA, HTS intact b/l, CHRISTINE intact   Gait/Balance: Normal, neg Romberg's    NIHSS: 0          Labs:   05-03    138  |  107  |  27<H>  ----------------------------<  110<H>  3.7   |  25  |  1.31<H>    Ca    9.1      03 May 2024 07:05  Phos  3.9     05-03  Mg     2.2     05-03    TPro  7.8  /  Alb  3.7  /  TBili  0.5  /  DBili  x   /  AST  9<L>  /  ALT  17  /  AlkPhos  40  05-03                              13.8   9.83  )-----------( 225      ( 03 May 2024 07:05 )             43.6       Radiology:  NONCONTRAST HEAD CT 5/2/24:  1.  No acute intracranial abnormality.    CTA HEAD AND NECK 5/2/24:  1.   CTA HEAD: No large vessel occlusion, aneurysm, or hemodynamically   significant stenosis. Sinus disease.  2.   CTA NECK: No occlusion, dissection, aneurysm, or hemodynamically   significant stenosis. Findings suggestive of fibromuscular dysplasia of   the cervical ICA bilaterally.    CT head 4/29/24:  No acute intracranial intracranial hemorrhage, midline shift or mass effect.  Mild white matter small vessel ischemic disease.

## 2024-05-03 NOTE — CONSULT NOTE ADULT - ASSESSMENT
53M with h/o TIA 2006 (p/w dysarthria and one sided numbness), HTN, HLD, TRIP non-compliant with CPAP, V-Tach s/p ICD, DM, AFib on Xarelto p/w sudden onset dizziness/spinning and generalized HA, nausea, sweats while talking to his significant other. Reports he wasn't able to get out of his chair, and his significant other called 911. Sx's resolved after receiving meclizine/zofran in the ED CTA H&N was neg for acute ischemia with findings suggestive of fibromuscular dysplasia of the cervical ICA bilaterally.  Found to have +NSTEMI.  Pt. is admitted for TIA/CVA/NSTEMI w/u.  Last week he had an episode of lightheadedness and scotoma while driving, pulled over and closed each eye and in the L eye lost half of his vision for a few seconds, then all sx's resolved and he continued driving.  He did not go to the hospital.  Then on Monday he had a follow up apt with his cardiologist Dr. Toth and was told to come to the ED for TIA.  CT head on 4/29 was neg for acute findings and he was advised to follow up with a neurologist.  Denies dysarthria, diplopia, focal weakness/numbness, unsteady gait, facial droop.  PE is non-focal.  NIHSS 0        #possible peripheral vertigo episode but cannot r/o post circulation stroke.  Pt. is high risk for stroke with TIA last week, 2006, and risk factors    #NSTEMI    #fibromuscular dysplasia b/l cervical ICA      Recommendations  -Monitor on tele  -Interrogate ICD and check for MRI compatibilty  -If ICD is compatible get MRI brain, MRA neck  -Continue Xarelto for AFib, add ASA 81mg QD  -Increase atorvastatin to 40mg QD (titrate to goal LDL <70)  -Check LDL, A1c  -DVT prophylaxis  -Dysphagia screen  -Neurochecks/VS q 4  -2 D TTE  -PT eval/ rehab eval  -will follow    D/W Dr. Diallo, patient    D/W Dr. Diallo     Mr. Aj is a 53M with h/o TIA 2006 (p/w dysarthria and one sided numbness), HTN, HLD, TRIP non-compliant with CPAP, V-Tach s/p ICD, DM, AFib on Xarelto p/w sudden onset dizziness/spinning and generalized HA, nausea, sweats while talking to his significant other. Reports he wasn't able to get out of his chair, and his significant other called 911. Sx's resolved after receiving meclizine/zofran in the ED CTA H&N was neg for acute ischemia with findings suggestive of fibromuscular dysplasia of the cervical ICA bilaterally.  Found to have +NSTEMI.  Pt. is admitted for TIA/CVA/NSTEMI w/u.  Last week he had an episode of lightheadedness and scotoma while driving, pulled over and closed each eye and in the L eye lost half of his vision for a few seconds, then all sx's resolved and he continued driving.  He did not go to the hospital.  Then on Monday he had a follow up apt with his cardiologist Dr. Toth and was told to come to the ED for TIA.  CT head on 4/29 was neg for acute findings and he was advised to follow up with a neurologist.  Denies dysarthria, diplopia, focal weakness/numbness, unsteady gait, facial droop.  PE is non-focal.  NIHSS 0        #possible peripheral vertigo episode but cannot r/o posterior circulation stroke.  Pt. is high risk for stroke due to risk factors, and possible neuro event last week.    #NSTEMI    #fibromuscular dysplasia b/l cervical ICA      Recommendations  -Monitor on tele  -Interrogate ICD and check for MRI compatibilty  -If ICD is compatible get MRI brain, MRA neck  -Continue Xarelto for AFib, add ASA 81mg QD  -Increase atorvastatin to 40mg QD (titrate to goal LDL <70)  -Check LDL, A1c  -DVT prophylaxis  -Dysphagia screen  -Neurochecks/VS q 4  -2 D TTE  -PT eval/ rehab eval  -will follow    D/W Dr. Diallo, patient

## 2024-05-03 NOTE — PATIENT PROFILE ADULT - NSPROPOAURINARYCATHETER_GEN_A_NUR
23 year old with seizure like activity, GI consulted for rule out celiac    1. Seizure rule out per neurology    2. Possible celiac. Positive HLA test. TTG reportedly negative outpt but in the setting of gluten avoidance. Has had some gluten exposure recently, as per neuro request will rpt celiac serology and perform EGD w small bowel biopsies but may require formal gluten challenge to obtain accurate diagnosis.        Advanced care planning forms were discussed. Code status including forceful chest compressions, defibrillation and intubation were discussed. The risks benefits and alternatives to pertinent gastrointestinal procedures and interventions were discussed in detail and all questions were answered. Duration: 15 Minutes.      Fredy oCte M.D.   Gastroenterology and Hepatology  [address] [place], [place]  Office: [phone]  Cell: [phone] no

## 2024-05-03 NOTE — H&P ADULT - NSHPLABSRESULTS_GEN_ALL_CORE
LABS:  cret                        13.8   8.34  )-----------( 240      ( 02 May 2024 18:50 )             43.3     05-02    139  |  108  |  17  ----------------------------<  135<H>  4.8   |  27  |  1.29    Ca    8.9      02 May 2024 18:50  Mg     2.1     05-02    TPro  7.6  /  Alb  3.7  /  TBili  0.7  /  DBili  x   /  AST  10<L>  /  ALT  15  /  AlkPhos  39<L>  05-02    PT/INR - ( 02 May 2024 18:50 )   PT: 16.1 sec;   INR: 1.44 ratio         PTT - ( 02 May 2024 18:50 )  PTT:34.6 sec

## 2024-05-04 LAB
ALBUMIN SERPL ELPH-MCNC: 3.6 G/DL — SIGNIFICANT CHANGE UP (ref 3.3–5)
ALP SERPL-CCNC: 41 U/L — SIGNIFICANT CHANGE UP (ref 40–120)
ALT FLD-CCNC: 19 U/L — SIGNIFICANT CHANGE UP (ref 12–78)
ANION GAP SERPL CALC-SCNC: 9 MMOL/L — SIGNIFICANT CHANGE UP (ref 5–17)
AST SERPL-CCNC: 20 U/L — SIGNIFICANT CHANGE UP (ref 15–37)
BILIRUB SERPL-MCNC: 0.4 MG/DL — SIGNIFICANT CHANGE UP (ref 0.2–1.2)
BUN SERPL-MCNC: 26 MG/DL — HIGH (ref 7–23)
CALCIUM SERPL-MCNC: 8.8 MG/DL — SIGNIFICANT CHANGE UP (ref 8.5–10.1)
CHLORIDE SERPL-SCNC: 109 MMOL/L — HIGH (ref 96–108)
CO2 SERPL-SCNC: 20 MMOL/L — LOW (ref 22–31)
CREAT SERPL-MCNC: 1.34 MG/DL — HIGH (ref 0.5–1.3)
EGFR: 63 ML/MIN/1.73M2 — SIGNIFICANT CHANGE UP
GLUCOSE BLDC GLUCOMTR-MCNC: 108 MG/DL — HIGH (ref 70–99)
GLUCOSE BLDC GLUCOMTR-MCNC: 111 MG/DL — HIGH (ref 70–99)
GLUCOSE BLDC GLUCOMTR-MCNC: 130 MG/DL — HIGH (ref 70–99)
GLUCOSE BLDC GLUCOMTR-MCNC: 92 MG/DL — SIGNIFICANT CHANGE UP (ref 70–99)
GLUCOSE SERPL-MCNC: 93 MG/DL — SIGNIFICANT CHANGE UP (ref 70–99)
HCT VFR BLD CALC: 38.7 % — LOW (ref 39–50)
HGB BLD-MCNC: 12.3 G/DL — LOW (ref 13–17)
MAGNESIUM SERPL-MCNC: 2.2 MG/DL — SIGNIFICANT CHANGE UP (ref 1.6–2.6)
MCHC RBC-ENTMCNC: 28.2 PG — SIGNIFICANT CHANGE UP (ref 27–34)
MCHC RBC-ENTMCNC: 31.8 GM/DL — LOW (ref 32–36)
MCV RBC AUTO: 88.8 FL — SIGNIFICANT CHANGE UP (ref 80–100)
PHOSPHATE SERPL-MCNC: 3.8 MG/DL — SIGNIFICANT CHANGE UP (ref 2.5–4.5)
PLATELET # BLD AUTO: 251 K/UL — SIGNIFICANT CHANGE UP (ref 150–400)
POTASSIUM SERPL-MCNC: 4.1 MMOL/L — SIGNIFICANT CHANGE UP (ref 3.5–5.3)
POTASSIUM SERPL-SCNC: 4.1 MMOL/L — SIGNIFICANT CHANGE UP (ref 3.5–5.3)
PROT SERPL-MCNC: 7.8 GM/DL — SIGNIFICANT CHANGE UP (ref 6–8.3)
RBC # BLD: 4.36 M/UL — SIGNIFICANT CHANGE UP (ref 4.2–5.8)
RBC # FLD: 15.1 % — HIGH (ref 10.3–14.5)
SODIUM SERPL-SCNC: 138 MMOL/L — SIGNIFICANT CHANGE UP (ref 135–145)
WBC # BLD: 7.97 K/UL — SIGNIFICANT CHANGE UP (ref 3.8–10.5)
WBC # FLD AUTO: 7.97 K/UL — SIGNIFICANT CHANGE UP (ref 3.8–10.5)

## 2024-05-04 PROCEDURE — 93010 ELECTROCARDIOGRAM REPORT: CPT

## 2024-05-04 PROCEDURE — 99232 SBSQ HOSP IP/OBS MODERATE 35: CPT

## 2024-05-04 PROCEDURE — 70450 CT HEAD/BRAIN W/O DYE: CPT | Mod: 26

## 2024-05-04 RX ADMIN — ATORVASTATIN CALCIUM 10 MILLIGRAM(S): 80 TABLET, FILM COATED ORAL at 21:34

## 2024-05-04 RX ADMIN — SODIUM CHLORIDE 50 MILLILITER(S): 9 INJECTION INTRAMUSCULAR; INTRAVENOUS; SUBCUTANEOUS at 11:35

## 2024-05-04 RX ADMIN — Medication 25 MILLIGRAM(S): at 02:59

## 2024-05-04 RX ADMIN — Medication 40 MILLIGRAM(S): at 21:34

## 2024-05-04 RX ADMIN — Medication 81 MILLIGRAM(S): at 09:34

## 2024-05-04 RX ADMIN — Medication 25 MILLIGRAM(S): at 09:34

## 2024-05-04 NOTE — DIETITIAN INITIAL EVALUATION ADULT - OTHER INFO
53M was at home today when he developed sudden onset dizziness/spinning and generalized HA at4:30. Pt felt nauseous and sweaty. Patient reports he had been in his usual state of health, he was sitting and talking to his significant other and he suddenly started to feel his "head spin, the room and everything" associated with headache. He felt nauseas, and became diaphoretic. Reports he wasn't able to get out of his chair, and the significant other called 911. The sensation lasted until he got to ED and received meclizine. Reports he had a TIA last Wednesday. Denies CP, dyspnea, blurry vision, diplopia, AMS.  Admitted for NSTEMI    Prescribed a consistent carbohydrate diet at .  Breakfast tray present upon RD arrival, patient consumed 100% while RD present.  No weight history to review.  Reports UBW ~ 325-330# with no recent weight changes.  Patient was sitting up eating breakfast, out of bed, unable to obtain bed scale weight.  EMR weight 5/3 was 327# - no weight changes.  Patient appears obese, NFPE negative for findings.  RD provided verbal education to patient for adequate energy intake and consistent carbohydrate intake r/t DM.  Agreed to trial Premier protein shake QD to optimize nutritional needs (provides 160 kcal, 30 g protein/ shake).  Recommend to c/w consistent carbohydrate diet as prescribed.  Cardiology and neurology following case.  See recommendations below.

## 2024-05-04 NOTE — PROGRESS NOTE ADULT - ASSESSMENT
53M with h/o TIA 2006 (p/w dysarthria and one sided numbness), HTN, HLD, TRIP non-compliant with CPAP, V-Tach s/p ICD, DM, AFib on Xarelto p/w sudden onset dizziness/spinning and generalized HA, nausea, sweats while talking to his significant other. Reports he wasn't able to get out of his chair, and his significant other called 911. Sx's resolved after receiving meclizine/zofran in the ED CTA H&N was neg for acute ischemia with findings suggestive of fibromuscular dysplasia of the cervical ICA bilaterally.  Found to have +NSTEMI.  Pt. is admitted for TIA/CVA/NSTEMI w/u.  Last week he had an episode of lightheadedness and scotoma while driving, pulled over and closed each eye and in the L eye lost half of his vision for a few seconds, then all sx's resolved and he continued driving.  He did not go to the hospital.  Then on Monday he had a follow up apt with his cardiologist Dr. Toth and was told to come to the ED for TIA.  CT head on 4/29 was neg for acute findings and he was advised to follow up with a neurologist.  Denies dysarthria, diplopia, focal weakness/numbness, unsteady gait, facial droop.  PE is non-focal.  NIHSS 0    # Possible peripheral vertigo episode,  cannot r/o posterior circulation stroke.  Pt. is high risk for stroke due to risk factors, and possible neuro event last week.    #NSTEMI    #Fibromuscular dysplasia b/l cervical ICA      Recommendations  -If ICD is compatible get MRI brain, MRA neck  -Continue Xarelto for AFib, add ASA 81mg QD  -Atorvastatin to 40mg QD (titrate to goal LDL <70)  -f/u TTE  -PT eval/ rehab eval

## 2024-05-04 NOTE — PROGRESS NOTE ADULT - SUBJECTIVE AND OBJECTIVE BOX
Patient has noted total resolution of vertigo, has no complaints.  Patient is awaiting MRI brain, he however informs me that he has AICD, initially implanted in 2010 may be incompatible with MRI machine    ROS: As above, other ROS Negative    MEDICATIONS  (STANDING):  amLODIPine   Tablet 5 milliGRAM(s) Oral daily  aspirin enteric coated 81 milliGRAM(s) Oral daily  atorvastatin 10 milliGRAM(s) Oral at bedtime  dextrose 10% Bolus 125 milliLiter(s) IV Bolus once  dextrose 5%. 1000 milliLiter(s) (100 mL/Hr) IV Continuous <Continuous>  dextrose 5%. 1000 milliLiter(s) (50 mL/Hr) IV Continuous <Continuous>  dextrose 50% Injectable 25 Gram(s) IV Push once  dextrose 50% Injectable 12.5 Gram(s) IV Push once  furosemide    Tablet 40 milliGRAM(s) Oral at bedtime  glucagon  Injectable 1 milliGRAM(s) IntraMuscular once  insulin lispro (ADMELOG) corrective regimen sliding scale   SubCutaneous at bedtime  insulin lispro (ADMELOG) corrective regimen sliding scale   SubCutaneous three times a day before meals  meclizine 25 milliGRAM(s) Oral every 6 hours  metoprolol tartrate 50 milliGRAM(s) Oral two times a day  sacubitril 49 mG/valsartan 51 mG 1 Tablet(s) Oral two times a day  sodium chloride 0.9%. 1000 milliLiter(s) (50 mL/Hr) IV Continuous <Continuous>  spironolactone 25 milliGRAM(s) Oral daily      Vital Signs Last 24 Hrs  T(C): 36.8 (04 May 2024 09:25), Max: 36.9 (03 May 2024 21:06)  T(F): 98.2 (04 May 2024 09:25), Max: 98.4 (03 May 2024 21:06)  HR: 72 (04 May 2024 09:25) (72 - 80)  BP: 93/51 (04 May 2024 09:25) (93/51 - 128/86)  BP(mean): --  RR: 18 (04 May 2024 09:25) (16 - 18)  SpO2: 96% (04 May 2024 09:25) (96% - 98%)    Parameters below as of 04 May 2024 09:25  Patient On (Oxygen Delivery Method): room air    Constitutional: awake, NAD  HEAD: Normocephalic  Neck: Supple.  Extremities:  no edema    Neurological exam:  HF: A x O x 3. Appropriately interactive, normal affect. Speech fluent, No Aphasia or paraphasic errors. Naming /repetition intact   CN: 3mm-2mm, MU, EOMI, VFF, no nystagmus, facial sensation normal, no NLFD, tongue midline, Palate moves equally, SCM equal bilaterally  Motor: No pronator drift, Strength 5/5 in all 4 ext, normal bulk and tone, no tremor, rigidity or bradykinesia.    Sens: Intact to light touch / PP/ VS/ JS    Reflexes: Symmetric and normal . BJ 2+, BR 2+, KJ trace+, AJ trace+, downgoing toes b/l  Coord:  No FNFA, HTS intact b/l, CHRISTINE intact   Gait/Balance: Normal, neg Romberg's    NIHSS: 0                              13.8   9.83  )-----------( 225      ( 03 May 2024 07:05 )             43.6     05-03    138  |  107  |  27<H>  ----------------------------<  110<H>  3.7   |  25  |  1.31<H>    Ca    9.1      03 May 2024 07:05  Phos  3.9     05-03  Mg     2.2     05-03    TPro  7.8  /  Alb  3.7  /  TBili  0.5  /  DBili  x   /  AST  9<L>  /  ALT  17  /  AlkPhos  40  05-03          Radiology report:  - < from: CT Head No Cont (05.04.24 @ 08:41) >  No acute intracranial hemorrhage, mass effect, or evidence of acute   vascular territorial infarction. If clinical symptoms persist or worsen,   more sensitive evaluation with brain MRI may be obtained, if no   contraindications exist.    < from: CT Angio Neck w/ IV Cont (05.02.24 @ 19:24) >  IMPRESSION:    NONCONTRAST HEAD CT:  1.  No acute intracranial abnormality.    CTA HEAD AND NECK:  1.   CTA HEAD: No large vessel occlusion, aneurysm, or hemodynamically   significant stenosis. Sinusdisease.  2.   CTA NECK: No occlusion, dissection, aneurysm, or hemodynamically   significant stenosis. Findings suggestive of fibromuscular dysplasia of   the cervical ICA bilaterally.

## 2024-05-04 NOTE — PROGRESS NOTE ADULT - ASSESSMENT
53M admitted for NSTEMI.    NSTEMI vs demand ischemia  -Denies CP and Trops down trending.   -Obtain serial trops and ECG.  -Increase atorvastatin from 10 to 80.  -Last xarelto on 5/1/24, hold xarelto until further cardio recs.   -C/w BB, amlodipine, entresto.  -Cardiology consult placed - Janey    Vertigo   -R/o posterior circulation stroke, recent TIA last week.   -Neuro exam non focal.   -Ordered MRI, unsure if compatible with MRI, f/u with radiology in am.   -PT evaluation  -PRN meclizine  -Neurology consult placed  -Fall precautions    DM  -Mod-SF, w/ bedtime coverage.  -Hypoglycemia protocol.    Other chronic problems:    Chronic systolic HF s/p AICD / Afib s/p cardioversion / nonischemic CMP / HTN / HLD / TRIP  -Reports compliance with meds.   -Appears compensated.   -C/w home meds  -Ins and outs and daily weights.   -Cardiology consult     Obesity  -Weight loss counseling.     DVT ppx: SCDs  Fall precautions.      53M admitted for dizziness      #Vertigo   -R/o posterior circulation stroke, recent TIA last week.   -Neuro exam non focal.   -Ordered MRI, unsure if compatible with MRI,   -PT evaluation  -PRN meclizine  -Neurology consulted, requested MRI  -Fall precautions      # Acute Myocardial injury 2/2  demand ischemia  -Denies CP and Trops down trending.   -Obtain serial trops and ECG.  -Increase atorvastatin from 10 to 80.  -Last xarelto on 5/1/24, hold xarelto until further cardio recs.   -C/w BB, amlodipine, entresto.  -Cardiology consulted  Dr Anderson, no further intervention   Echo done   chnically difficuly study. Not all cardiac structures were well visualized.   2. Left ventricular cavity is mildly dilated. Left ventricular systolic function is severely decreased with an ejection fraction visually estimated at 30 to 35 %. Global left ventricular hypokinesis.   3. Normal right ventricular cavity size and normal systolic function.   4. The left atrium is severely dilated.   5. Tracepulmonic regurgitation.   6. Mild mitral regurgitation.   7. Trace tricuspid regurgitation.        DM  -Mod-SF, w/ bedtime coverage.  -Hypoglycemia protocol.  Other chronic problems:    Chronic systolic HF s/p AICD / Afib s/p cardioversion / nonischemic CMP / HTN / HLD / TRIP  -Reports compliance with meds.   -Appears compensated.   -C/w home meds  -Ins and outs and daily weights.   -Cardiology consult     Obesity  -Weight loss counseling.     DVT ppx: SCDs  Fall precautions.
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
## 2024-05-04 NOTE — PHYSICAL THERAPY INITIAL EVALUATION ADULT - GENERAL OBSERVATIONS, REHAB EVAL
The Pt was received on 3N up in bedside chair, pleasant, calm, cooperative, and willing to participate with PT, +IV, +tele. Pt responded well to evaluation, patient independent in all aspects of mobility. Patient able to ambulate 200ft without AD and able to perform 10 step ups in stairwell to simulate stairs at home. Assisted back into bedside chair and adjusted for comfort, +alarm. The Pt was in NAD at end of tx.  Call bell, tray, and phone in place and within reach. NSG made aware.

## 2024-05-04 NOTE — DIETITIAN INITIAL EVALUATION ADULT - PERTINENT MEDS FT
MEDICATIONS  (STANDING):  amLODIPine   Tablet 5 milliGRAM(s) Oral daily  aspirin enteric coated 81 milliGRAM(s) Oral daily  atorvastatin 10 milliGRAM(s) Oral at bedtime  dextrose 10% Bolus 125 milliLiter(s) IV Bolus once  dextrose 5%. 1000 milliLiter(s) (50 mL/Hr) IV Continuous <Continuous>  dextrose 5%. 1000 milliLiter(s) (100 mL/Hr) IV Continuous <Continuous>  dextrose 50% Injectable 25 Gram(s) IV Push once  dextrose 50% Injectable 12.5 Gram(s) IV Push once  furosemide    Tablet 40 milliGRAM(s) Oral at bedtime  glucagon  Injectable 1 milliGRAM(s) IntraMuscular once  insulin lispro (ADMELOG) corrective regimen sliding scale   SubCutaneous three times a day before meals  insulin lispro (ADMELOG) corrective regimen sliding scale   SubCutaneous at bedtime  meclizine 25 milliGRAM(s) Oral every 6 hours  metoprolol tartrate 50 milliGRAM(s) Oral two times a day  sacubitril 49 mG/valsartan 51 mG 1 Tablet(s) Oral two times a day  sodium chloride 0.9%. 1000 milliLiter(s) (50 mL/Hr) IV Continuous <Continuous>  spironolactone 25 milliGRAM(s) Oral daily    MEDICATIONS  (PRN):  acetaminophen     Tablet .. 650 milliGRAM(s) Oral every 6 hours PRN Temp greater or equal to 38C (100.4F), Mild Pain (1 - 3)  aluminum hydroxide/magnesium hydroxide/simethicone Suspension 30 milliLiter(s) Oral every 4 hours PRN Dyspepsia  dextrose Oral Gel 15 Gram(s) Oral once PRN Blood Glucose LESS THAN 70 milliGRAM(s)/deciliter  melatonin 3 milliGRAM(s) Oral at bedtime PRN Insomnia  ondansetron Injectable 4 milliGRAM(s) IV Push every 8 hours PRN Nausea and/or Vomiting

## 2024-05-04 NOTE — DIETITIAN INITIAL EVALUATION ADULT - PERTINENT LABORATORY DATA
05-03    138  |  107  |  27<H>  ----------------------------<  110<H>  3.7   |  25  |  1.31<H>    Ca    9.1      03 May 2024 07:05  Phos  3.9     05-03  Mg     2.2     05-03    TPro  7.8  /  Alb  3.7  /  TBili  0.5  /  DBili  x   /  AST  9<L>  /  ALT  17  /  AlkPhos  40  05-03  POCT Blood Glucose.: 130 mg/dL (05-04-24 @ 11:59)  A1C with Estimated Average Glucose Result: 6.6 % (05-03-24 @ 07:05)

## 2024-05-04 NOTE — DIETITIAN INITIAL EVALUATION ADULT - ADD RECOMMEND
1) C/w consistent carbohydrate diet as prescribed.  Encourage protein-rich foods, maximize food preferences   2) Add Premier protein shake QD to optimize nutritional needs (provides 160 kcal, 30 g protein/ shake)   3) Monitor bowel movements, if no BM for >3 days, consider implementing bowel regimen.   4) Obtain weekly wt to track/trend changes   5) Obtain vitamin D 25OH level to assess nutriture   6) MVI w/ minerals daily to ensure 100% RDA met   RD will continue to monitor PO intake, labs, hydration, and wt prn.

## 2024-05-04 NOTE — PROGRESS NOTE ADULT - SUBJECTIVE AND OBJECTIVE BOX
Subjective:  Patient is a 53y old  Male who presents with a chief complaint of Dizziness and giddiness     (04 May 2024 12:25)    HPI:  53M was at home today when he developed sudden onset dizziness/spinning and generalized HA at4:30. Pt felt nauseous and sweaty. Patient reports he had been in his usual state of health, he was sitting and talking to his significant other and he suddenly started to feel his "head spin, the room and everything" associated with headache. He felt nauseas, and became diaphoretic. Reports he wasn't able to get out of his chair, and the significant other called 911. The sensation lasted until he got to ED and received meclizine. Reports he had a TIA last Wednesday. Denies CP, dyspnea, blurry vision, diplopia, AMS. Pt on Xarelto for Afib and last took dose on 24.   In ED patient with stable vitals, CBC unremarkable, CMP unremarkable, Trops 269 > 257, CTA head and neck done in ED, noted below. Admitted for cardiac evaluation.  (03 May 2024 00:20)         Patient seen and examined at bedside earlier today,     Review of system- Rest of the review of system are negative except mentioned in HPI    OBJECTIVE:   T(C): 36.8 (24 @ 09:25), Max: 36.9 (24 @ 21:06)  HR: 72 (24 @ 09:25) (72 - 80)  BP: 93/51 (24 @ 09:25) (93/51 - 128/86)  RR: 18 (24 @ 09:25) (16 - 18)  SpO2: 96% (24 @ 09:25) (96% - 98%)  Wt(kg): --  Daily     Daily Weight in k.4 (04 May 2024 02:48)    PHYSICAL EXAM:  GENERAL: NAD  NERVOUS SYSTEM:  Alert & Oriented X3, non- focal exam,  Motor Strength 5/5 B/L upper and lower extremities; DTRs 2+ intact and symmetric  HEAD:  Atraumatic, Normocephalic  EYES: EOMI, PERRLA, conjunctiva and sclera clear  HEENT: Moist mucous membranes  NECK: Supple, No JVD  CHEST/LUNG: Clear to auscultation bilaterally; No rales, no rhonchi, no wheezing  HEART: Regular rate and rhythm; No murmurs, no rubs or gallops  ABDOMEN: Soft, Nontender, Nondistended; Bowel sounds present  GENITOURINARY- Voiding, no suprapubic tenderness  EXTREMITIES:  2+ Peripheral Pulses, No clubbing, cyanosis,   edema  MUSCULOSKELETAL:- No muscle tenderness, Muscle tone normal, No joint tenderness, no Joint swelling,  Joint ROM -normal  SKIN-no rash, no lesion    LABS: all reviewed                         13.8   9.83  )-----------( 225      ( 03 May 2024 07:05 )             43.6     05-03    138  |  107  |  27<H>  ----------------------------<  110<H>  3.7   |  25  |  1.31<H>    Ca    9.1      03 May 2024 07:05  Phos  3.9     05-03  Mg     2.2     05-03    TPro  7.8  /  Alb  3.7  /  TBili  0.5  /  DBili  x   /  AST  9<L>  /  ALT  17  /  AlkPhos  40  05-03    PT/INR - ( 02 May 2024 18:50 )   PT: 16.1 sec;   INR: 1.44 ratio         PTT - ( 02 May 2024 18:50 )  PTT:34.6 sec      Urinalysis Basic - ( 03 May 2024 07:05 )    Color: x / Appearance: x / SG: x / pH: x  Gluc: 110 mg/dL / Ketone: x  / Bili: x / Urobili: x   Blood: x / Protein: x / Nitrite: x   Leuk Esterase: x / RBC: x / WBC x   Sq Epi: x / Non Sq Epi: x / Bacteria: x        CAPILLARY BLOOD GLUCOSE      POCT Blood Glucose.: 130 mg/dL (04 May 2024 11:59)  POCT Blood Glucose.: 92 mg/dL (04 May 2024 07:56)  POCT Blood Glucose.: 78 mg/dL (03 May 2024 22:42)  POCT Blood Glucose.: 132 mg/dL (03 May 2024 17:29)        RECENT CULTURES:    RADIOLOGY & ADDITIONAL TESTS: all reviewed   EKG reviewed        Current medications:  acetaminophen     Tablet .. 650 milliGRAM(s) Oral every 6 hours PRN  aluminum hydroxide/magnesium hydroxide/simethicone Suspension 30 milliLiter(s) Oral every 4 hours PRN  amLODIPine   Tablet 5 milliGRAM(s) Oral daily  aspirin enteric coated 81 milliGRAM(s) Oral daily  atorvastatin 10 milliGRAM(s) Oral at bedtime  dextrose 10% Bolus 125 milliLiter(s) IV Bolus once  dextrose 5%. 1000 milliLiter(s) IV Continuous <Continuous>  dextrose 5%. 1000 milliLiter(s) IV Continuous <Continuous>  dextrose 50% Injectable 25 Gram(s) IV Push once  dextrose 50% Injectable 12.5 Gram(s) IV Push once  dextrose Oral Gel 15 Gram(s) Oral once PRN  furosemide    Tablet 40 milliGRAM(s) Oral at bedtime  glucagon  Injectable 1 milliGRAM(s) IntraMuscular once  insulin lispro (ADMELOG) corrective regimen sliding scale   SubCutaneous at bedtime  insulin lispro (ADMELOG) corrective regimen sliding scale   SubCutaneous three times a day before meals  meclizine 25 milliGRAM(s) Oral every 6 hours  melatonin 3 milliGRAM(s) Oral at bedtime PRN  metoprolol tartrate 50 milliGRAM(s) Oral two times a day  ondansetron Injectable 4 milliGRAM(s) IV Push every 8 hours PRN  sacubitril 49 mG/valsartan 51 mG 1 Tablet(s) Oral two times a day  sodium chloride 0.9%. 1000 milliLiter(s) IV Continuous <Continuous>  spironolactone 25 milliGRAM(s) Oral daily             Subjective:  Patient is a 53y old  Male who presents with a chief complaint of Dizziness and giddiness     (04 May 2024 12:25)    HPI:  53M was at home today when he developed sudden onset dizziness/spinning and generalized HA at4:30. Pt felt nauseous and sweaty. Patient reports he had been in his usual state of health, he was sitting and talking to his significant other and he suddenly started to feel his "head spin, the room and everything" associated with headache. He felt nauseas, and became diaphoretic. Reports he wasn't able to get out of his chair, and the significant other called 911. The sensation lasted until he got to ED and received meclizine. Reports he had a TIA last Wednesday. Denies CP, dyspnea, blurry vision, diplopia, AMS. Pt on Xarelto for Afib and last took dose on 24.   In ED patient with stable vitals, CBC unremarkable, CMP unremarkable, Trops 269 > 257, CTA head and neck done in ED, noted below. Admitted for cardiac evaluation.  (03 May 2024 00:20)         Patient seen and examined at bedside earlier today, patient feeling better  no vertigo     Review of system- Rest of the review of system are negative except mentioned in HPI    OBJECTIVE:   T(C): 36.8 (24 @ 09:25), Max: 36.9 (24 @ 21:06)  HR: 72 (24 @ 09:25) (72 - 80)  BP: 93/51 (24 @ 09:25) (93/51 - 128/86)  RR: 18 (24 @ 09:25) (16 - 18)  SpO2: 96% (24 @ 09:25) (96% - 98%)  Wt(kg): --  Daily     Daily Weight in k.4 (04 May 2024 02:48)    PHYSICAL EXAM:  GENERAL: NAD  NERVOUS SYSTEM:  Alert & Oriented X3, non- focal exam,  Motor Strength 5/5 B/L upper and lower extremities; DTRs 2+ intact and symmetric  HEAD:  Atraumatic, Normocephalic  EYES: EOMI, PERRLA, conjunctiva and sclera clear  HEENT: Moist mucous membranes  NECK: Supple, No JVD  CHEST/LUNG: Clear to auscultation bilaterally; No rales, no rhonchi, no wheezing  HEART: Regular rate and rhythm; No murmurs, no rubs or gallops  ABDOMEN: Soft, Nontender, Nondistended; Bowel sounds present  GENITOURINARY- Voiding, no suprapubic tenderness  EXTREMITIES:  2+ Peripheral Pulses, No clubbing, cyanosis,   edema  MUSCULOSKELETAL:- No muscle tenderness, Muscle tone normal, No joint tenderness, no Joint swelling,  Joint ROM -normal  SKIN-no rash, no lesion    LABS: all reviewed                         13.8   9.83  )-----------( 225      ( 03 May 2024 07:05 )             43.6     05-03    138  |  107  |  27<H>  ----------------------------<  110<H>  3.7   |  25  |  1.31<H>    Ca    9.1      03 May 2024 07:05  Phos  3.9     05-03  Mg     2.2     05-03    TPro  7.8  /  Alb  3.7  /  TBili  0.5  /  DBili  x   /  AST  9<L>  /  ALT  17  /  AlkPhos  40  05-03    PT/INR - ( 02 May 2024 18:50 )   PT: 16.1 sec;   INR: 1.44 ratio         PTT - ( 02 May 2024 18:50 )  PTT:34.6 sec      Urinalysis Basic - ( 03 May 2024 07:05 )    Color: x / Appearance: x / SG: x / pH: x  Gluc: 110 mg/dL / Ketone: x  / Bili: x / Urobili: x   Blood: x / Protein: x / Nitrite: x   Leuk Esterase: x / RBC: x / WBC x   Sq Epi: x / Non Sq Epi: x / Bacteria: x        CAPILLARY BLOOD GLUCOSE      POCT Blood Glucose.: 130 mg/dL (04 May 2024 11:59)  POCT Blood Glucose.: 92 mg/dL (04 May 2024 07:56)  POCT Blood Glucose.: 78 mg/dL (03 May 2024 22:42)  POCT Blood Glucose.: 132 mg/dL (03 May 2024 17:29)        RECENT CULTURES:    RADIOLOGY & ADDITIONAL TESTS: all reviewed   EKG reviewed        Current medications:  acetaminophen     Tablet .. 650 milliGRAM(s) Oral every 6 hours PRN  aluminum hydroxide/magnesium hydroxide/simethicone Suspension 30 milliLiter(s) Oral every 4 hours PRN  amLODIPine   Tablet 5 milliGRAM(s) Oral daily  aspirin enteric coated 81 milliGRAM(s) Oral daily  atorvastatin 10 milliGRAM(s) Oral at bedtime  dextrose 10% Bolus 125 milliLiter(s) IV Bolus once  dextrose 5%. 1000 milliLiter(s) IV Continuous <Continuous>  dextrose 5%. 1000 milliLiter(s) IV Continuous <Continuous>  dextrose 50% Injectable 25 Gram(s) IV Push once  dextrose 50% Injectable 12.5 Gram(s) IV Push once  dextrose Oral Gel 15 Gram(s) Oral once PRN  furosemide    Tablet 40 milliGRAM(s) Oral at bedtime  glucagon  Injectable 1 milliGRAM(s) IntraMuscular once  insulin lispro (ADMELOG) corrective regimen sliding scale   SubCutaneous at bedtime  insulin lispro (ADMELOG) corrective regimen sliding scale   SubCutaneous three times a day before meals  meclizine 25 milliGRAM(s) Oral every 6 hours  melatonin 3 milliGRAM(s) Oral at bedtime PRN  metoprolol tartrate 50 milliGRAM(s) Oral two times a day  ondansetron Injectable 4 milliGRAM(s) IV Push every 8 hours PRN  sacubitril 49 mG/valsartan 51 mG 1 Tablet(s) Oral two times a day  sodium chloride 0.9%. 1000 milliLiter(s) IV Continuous <Continuous>  spironolactone 25 milliGRAM(s) Oral daily

## 2024-05-05 ENCOUNTER — TRANSCRIPTION ENCOUNTER (OUTPATIENT)
Age: 54
End: 2024-05-05

## 2024-05-05 VITALS
OXYGEN SATURATION: 100 % | RESPIRATION RATE: 18 BRPM | SYSTOLIC BLOOD PRESSURE: 104 MMHG | DIASTOLIC BLOOD PRESSURE: 81 MMHG | TEMPERATURE: 98 F | HEART RATE: 61 BPM

## 2024-05-05 LAB
GLUCOSE BLDC GLUCOMTR-MCNC: 103 MG/DL — HIGH (ref 70–99)
GLUCOSE BLDC GLUCOMTR-MCNC: 147 MG/DL — HIGH (ref 70–99)

## 2024-05-05 PROCEDURE — 99239 HOSP IP/OBS DSCHRG MGMT >30: CPT

## 2024-05-05 RX ORDER — MECLIZINE HCL 12.5 MG
1 TABLET ORAL
Qty: 60 | Refills: 0
Start: 2024-05-05 | End: 2024-05-19

## 2024-05-05 RX ORDER — ASPIRIN/CALCIUM CARB/MAGNESIUM 324 MG
1 TABLET ORAL
Qty: 0 | Refills: 0 | DISCHARGE
Start: 2024-05-05

## 2024-05-05 RX ORDER — ATORVASTATIN CALCIUM 80 MG/1
1 TABLET, FILM COATED ORAL
Refills: 0 | DISCHARGE

## 2024-05-05 RX ORDER — DIAZEPAM 5 MG
5 TABLET ORAL ONCE
Refills: 0 | Status: COMPLETED | OUTPATIENT
Start: 2024-05-05 | End: 2024-05-05

## 2024-05-05 RX ORDER — ATORVASTATIN CALCIUM 80 MG/1
1 TABLET, FILM COATED ORAL
Qty: 30 | Refills: 0
Start: 2024-05-05 | End: 2024-06-03

## 2024-05-05 RX ADMIN — Medication 25 MILLIGRAM(S): at 05:25

## 2024-05-05 RX ADMIN — SACUBITRIL AND VALSARTAN 1 TABLET(S): 24; 26 TABLET, FILM COATED ORAL at 09:23

## 2024-05-05 RX ADMIN — Medication 81 MILLIGRAM(S): at 09:24

## 2024-05-05 RX ADMIN — Medication 25 MILLIGRAM(S): at 00:03

## 2024-05-05 RX ADMIN — SPIRONOLACTONE 25 MILLIGRAM(S): 25 TABLET, FILM COATED ORAL at 09:24

## 2024-05-05 NOTE — DISCHARGE NOTE PROVIDER - NSDCMRMEDTOKEN_GEN_ALL_CORE_FT
amLODIPine 5 mg oral tablet: 1 tab(s) orally once a day  atorvastatin 10 mg oral tablet: 1 tab(s) orally once a day before dinner  Entresto 49 mg-51 mg oral tablet: 1 tab(s) orally 2 times a day  furosemide 40 mg oral tablet: 1 tab(s) orally once a day before dinner  metFORMIN 500 mg oral tablet: 1 tab(s) orally 2 times a day  metoprolol tartrate 50 mg oral tablet: 1 tab(s) orally 2 times a day  spironolactone 25 mg oral tablet: 1 tab(s) orally once a day before dinner  Xarelto 20 mg oral tablet: 1 tab(s) orally once a day (in the evening)   amLODIPine 5 mg oral tablet: 1 tab(s) orally once a day  aspirin 81 mg oral delayed release tablet: 1 tab(s) orally once a day  Entresto 49 mg-51 mg oral tablet: 1 tab(s) orally 2 times a day  furosemide 40 mg oral tablet: 1 tab(s) orally once a day before dinner  Lipitor 40 mg oral tablet: 1 tab(s) orally once a day (at bedtime)  meclizine 25 mg oral tablet: 1 tab(s) orally every 6 hours as needed for  dizziness  metFORMIN 500 mg oral tablet: 1 tab(s) orally 2 times a day  metoprolol tartrate 50 mg oral tablet: 1 tab(s) orally 2 times a day  spironolactone 25 mg oral tablet: 1 tab(s) orally once a day before dinner  Xarelto 20 mg oral tablet: 1 tab(s) orally once a day (in the evening)

## 2024-05-05 NOTE — DISCHARGE NOTE NURSING/CASE MANAGEMENT/SOCIAL WORK - PATIENT PORTAL LINK FT
You can access the FollowMyHealth Patient Portal offered by [facility] by registering at the following website: [email or web address]. By joining [facility]’s FollowMyHealth portal, you will also be able to view your health information using other applications (apps) compatible with our system.

## 2024-05-05 NOTE — DISCHARGE NOTE PROVIDER - CARE PROVIDERS DIRECT ADDRESSES
[email or web address]

## 2024-05-05 NOTE — DISCHARGE NOTE PROVIDER - NSDCCPCAREPLAN_GEN_ALL_CORE_FT
PRINCIPAL DISCHARGE DIAGNOSIS  Diagnosis: Vertigo  Assessment and Plan of Treatment: follow upwith neurology and cardiologyas outpatient  return to er if any signs/symptoms of stroke or worsenign vertigo  use meclizine as needed for dizziness   see dr castro regarding chanign xarelto to eliquis if he recommends when you see him in the office      SECONDARY DISCHARGE DIAGNOSES  Diagnosis: Troponin I above reference range  Assessment and Plan of Treatment:

## 2024-05-05 NOTE — DISCHARGE NOTE PROVIDER - CARE PROVIDER_API CALL
Jessee Nascimento  Cardiology  [address] [place] [address]  Phone: [phone]  Fax: [phone]  Follow Up Time:     Adrian Macias  Family Medicine  [address] [place] [address]  Phone: [phone]  Fax: [phone]  Follow Up Time:

## 2024-05-05 NOTE — DISCHARGE NOTE PROVIDER - NSDCFUSCHEDAPPT_GEN_ALL_CORE_FT
Sonu Amado  [facility]  NEUROLOGY [address]  Scheduled Appointment: 06/07/2024    [facility]  ELECTROPH [address]  Scheduled Appointment: 07/29/2024

## 2024-05-05 NOTE — DISCHARGE NOTE PROVIDER - HOSPITAL COURSE
HPI:  53M was at home today when he developed sudden onset dizziness/spinning and generalized HA at4:30. Pt felt nauseous and sweaty. Patient reports he had been in his usual state of health, he was sitting and talking to his significant other and he suddenly started to feel his "head spin, the room and everything" associated with headache. He felt nauseas, and became diaphoretic. Reports he wasn't able to get out of his chair, and the significant other called 911. The sensation lasted until he got to ED and received meclizine. Reports he had a TIA last Wednesday. Denies CP, dyspnea, blurry vision, diplopia, AMS. Pt on Xarelto for Afib and last took dose on 5/1/24.   In ED patient with stable vitals, CBC unremarkable, CMP unremarkable, Trops 269 > 257, CTA head and neck done in ED, noted below. Admitted for cardiac evaluation.  (03 May 2024 00:20)        · Assessment	  53M admitted for dizziness      #Vertigo - resolved  - case d/w dr horowitz and dr amezquita.  unable to do mri due to incompatible PPM his leads are from 2011  - okay to dc as per neuro with outpt f/u   -Neuro exam non focal.   -PT evaluation  -PRN meclizine  -Neurology consulted, requested MRI  -Fall precautions      # Acute Myocardial injury 2/2  demand ischemia  -Denies CP and Trops down trending.   -Obtain serial trops and ECG.  -Increase atorvastatin from 10 to 80.  -Last xarelto on 5/1/24, hold xarelto until further cardio recs. will d/w cardio and dc on eliquis if okay with dr castro  -C/w BB, amlodipine, entresto.  -Cardiology consulted  Dr Anderson, no further intervention   Echo done   clinically difficuly study. Not all cardiac structures were well visualized.   2. Left ventricular cavity is mildly dilated. Left ventricular systolic function is severely decreased with an ejection fraction visually estimated at 30 to 35 %. Global left ventricular hypokinesis.   3. Normal right ventricular cavity size and normal systolic function.   4. The left atrium is severely dilated.   5. Trace pulmonic regurgitation.   6. Mild mitral regurgitation.   7. Trace tricuspid regurgitation.        DM  -Mod-SF, w/ bedtime coverage.  -Hypoglycemia protocol.  Other chronic problems:    Chronic systolic HF s/p AICD / Afib s/p cardioversion / nonischemic CMP / HTN / HLD / TRIP  -Reports compliance with meds.   -Appears compensated.   -C/w home meds  -Ins and outs and daily weights.   -Cardiology consult     Obesity  -Weight loss counseling.     DVT ppx: SCDs  Fall precautions.            HPI:  53M was at home today when he developed sudden onset dizziness/spinning and generalized HA at4:30. Pt felt nauseous and sweaty. Patient reports he had been in his usual state of health, he was sitting and talking to his significant other and he suddenly started to feel his "head spin, the room and everything" associated with headache. He felt nauseas, and became diaphoretic. Reports he wasn't able to get out of his chair, and the significant other called 911. The sensation lasted until he got to ED and received meclizine. Reports he had a TIA last Wednesday. Denies CP, dyspnea, blurry vision, diplopia, AMS. Pt on Xarelto for Afib and last took dose on 5/1/24.   In ED patient with stable vitals, CBC unremarkable, CMP unremarkable, Trops 269 > 257, CTA head and neck done in ED, noted below. Admitted for cardiac evaluation.  (03 May 2024 00:20)    pt admitted and seenby neuro, cardio and EP.  neuro sx have resolved. i spoke with EP and PPM is not MRI compatible. i dw dr amezquita okay to dc home and outtp f/u with neuro and cards. left msg for dr castro regardingchanging to eliqu but as per pt he will see him in 1-2 days and discuss further and he would prefer to stay on xarelto for now. r/b/a d/w pt and all questions answered       · Assessment	  53M admitted for dizziness      #Vertigo - resolved  - case d/w dr horowitz and dr amezquita.  unable to do mri due to incompatible PPM his leads are from 2011  - okay to dc as per neuro with outpt f/u   -Neuro exam non focal.   -PT evaluation  -PRN meclizine  -Neurology consulted, requested MRI -ppm not compatible   -Fall precautions      # Acute Myocardial injury 2/2  demand ischemia  -Denies CP and Trops down trending.   -Obtain serial trops and ECG.  -Increase atorvastatin from 10 to 80.  -Last xarelto on 5/1/24, hold xarelto until further cardio recs. will d/w cardio and dc on xarelto. left msg for for dr castro and pt will d/w him at office prior to chagning to eliquis  -C/w BB, amlodipine, entresto.  -Cardiology consulted  Dr Anderson, no further intervention   Echo done   clinically difficuly study. Not all cardiac structures were well visualized.   2. Left ventricular cavity is mildly dilated. Left ventricular systolic function is severely decreased with an ejection fraction visually estimated at 30 to 35 %. Global left ventricular hypokinesis.   3. Normal right ventricular cavity size and normal systolic function.   4. The left atrium is severely dilated.   5. Trace pulmonic regurgitation.   6. Mild mitral regurgitation.   7. Trace tricuspid regurgitation.        DM  -Mod-SF, w/ bedtime coverage.  -Hypoglycemia protocol.  Other chronic problems:    Chronic systolic HF s/p AICD / Afib s/p cardioversion / nonischemic CMP / HTN / HLD / TRIP  -Reports compliance with meds.   -Appears compensated.   -C/w home meds  -Ins and outs and daily weights.   -Cardiology consult     Obesity  -Weight loss counseling.     DVT ppx: SCDs  Fall precautions.

## 2024-05-14 DIAGNOSIS — I77.3 ARTERIAL FIBROMUSCULAR DYSPLASIA: ICD-10-CM

## 2024-05-14 DIAGNOSIS — Z86.73 PERSONAL HISTORY OF TRANSIENT ISCHEMIC ATTACK (TIA), AND CEREBRAL INFARCTION WITHOUT RESIDUAL DEFICITS: ICD-10-CM

## 2024-05-14 DIAGNOSIS — I11.0 HYPERTENSIVE HEART DISEASE WITH HEART FAILURE: ICD-10-CM

## 2024-05-14 DIAGNOSIS — R42 DIZZINESS AND GIDDINESS: ICD-10-CM

## 2024-05-14 DIAGNOSIS — I21.A1 MYOCARDIAL INFARCTION TYPE 2: ICD-10-CM

## 2024-05-14 DIAGNOSIS — I48.0 PAROXYSMAL ATRIAL FIBRILLATION: ICD-10-CM

## 2024-05-14 DIAGNOSIS — Z95.810 PRESENCE OF AUTOMATIC (IMPLANTABLE) CARDIAC DEFIBRILLATOR: ICD-10-CM

## 2024-05-14 DIAGNOSIS — E66.01 MORBID (SEVERE) OBESITY DUE TO EXCESS CALORIES: ICD-10-CM

## 2024-05-14 DIAGNOSIS — E11.9 TYPE 2 DIABETES MELLITUS WITHOUT COMPLICATIONS: ICD-10-CM

## 2024-05-14 DIAGNOSIS — G47.33 OBSTRUCTIVE SLEEP APNEA (ADULT) (PEDIATRIC): ICD-10-CM

## 2024-05-14 DIAGNOSIS — I42.8 OTHER CARDIOMYOPATHIES: ICD-10-CM

## 2024-05-14 DIAGNOSIS — E78.5 HYPERLIPIDEMIA, UNSPECIFIED: ICD-10-CM

## 2024-05-14 DIAGNOSIS — Z79.84 LONG TERM (CURRENT) USE OF ORAL HYPOGLYCEMIC DRUGS: ICD-10-CM

## 2024-05-14 DIAGNOSIS — Z79.01 LONG TERM (CURRENT) USE OF ANTICOAGULANTS: ICD-10-CM

## 2024-05-14 DIAGNOSIS — I50.22 CHRONIC SYSTOLIC (CONGESTIVE) HEART FAILURE: ICD-10-CM

## 2024-06-06 ENCOUNTER — NON-APPOINTMENT (OUTPATIENT)
Age: 54
End: 2024-06-06

## 2024-06-07 ENCOUNTER — APPOINTMENT (OUTPATIENT)
Dept: NEUROLOGY | Facility: CLINIC | Age: 54
End: 2024-06-07
Payer: MEDICARE

## 2024-06-07 VITALS
TEMPERATURE: 98.2 F | SYSTOLIC BLOOD PRESSURE: 105 MMHG | BODY MASS INDEX: 49.44 KG/M2 | HEIGHT: 67 IN | HEART RATE: 67 BPM | WEIGHT: 315 LBS | DIASTOLIC BLOOD PRESSURE: 70 MMHG

## 2024-06-07 DIAGNOSIS — H54.62 UNQUALIFIED VISUAL LOSS, LEFT EYE, NORMAL VISION RIGHT EYE: ICD-10-CM

## 2024-06-07 PROCEDURE — 99214 OFFICE O/P EST MOD 30 MIN: CPT

## 2024-06-07 RX ORDER — METFORMIN HYDROCHLORIDE 500 MG/1
500 TABLET, COATED ORAL TWICE DAILY
Refills: 0 | Status: ACTIVE | COMMUNITY

## 2024-06-07 NOTE — REVIEW OF SYSTEMS
[As Noted in HPI] : as noted in HPI [Dizziness] : dizziness [Migraine Headache] : no migraine headache [Negative] : Heme/Lymph

## 2024-06-07 NOTE — DATA REVIEWED
[de-identified] :   ACC: [id number]     EXAM:  CT BRAIN   ORDERED BY: JULIANN FABIAN  PROCEDURE DATE:  05/04/2024    INTERPRETATION:  CLINICAL INFORMATION: Headache and dizziness.  COMPARISON: CT head of 5/2/2024.  PROCEDURE: Noncontrast CT of the Head was performed from the skull base to the vertex. Coronal and Sagittal reformats were obtained.  FINDINGS:  There is no acute intracranial hemorrhage, mass effect, midline shift, extra-axial collection, hydrocephalus, or evidence of acute vascular territorial infarction. Mild patchy hypodensities within the periventricular and subcortical white matter, although nonspecific, likely reflect chronic microvascular disease.  The visualized paranasal sinuses and mastoid air cells are clear. Intraorbital contents are unremarkable. Calvarium is intact.  IMPRESSION:  No acute intracranial hemorrhage, mass effect, or evidence of acute vascular territorial infarction. If clinical symptoms persist or worsen, more sensitive evaluation with brain MRI may be obtained, if no contraindications exist.  --- End of Report ---      NAKUL FINNEGAN MD; Attending Radiologist This document has been electronically signed. May  4 2024  8:50AM      ACC: [id number]     EXAM:  CT ANGIO NECK (W)AW IC   ORDERED BY: DARRIN JUNE  ACC: [id number]     EXAM:  CT ANGIO BRAIN (W)AW IC   ORDERED BY: DARIRN JUNE  PROCEDURE DATE:  05/02/2024    INTERPRETATION:  CT HEAD WITHOUT CONTRAST AND CTA OF THE HEAD AND NECK  INDICATION: sudden vertigo, HA  TECHNIQUE: Multiple axial images of the head were obtained from the skull base to the vertex without intravenous contrast.  Multiplanar reformats were created according to the standard protocol. Subsequently, following the intravenous injection of non-ionic contrast material, serial thin images were obtained with attention to the cervical and intracranial arterial vasculature.  Multiplanar reconstructions were performed including reformatting using a dedicated 3D software package with viewing on a dedicated workstation in multiple planes.  MIP image analysis was performed on a separate workstation.  CONTRAST: 90 mL Omnipaque 350 was administered. 0 mL was discarded.  COMPARISON: Head CT without contrast 4/29/2024. __________________ FINDINGS:  NONCONTRAST HEAD CT:  Intracranial Hemorrhage: None.  Infarct/Vascular: No CT evidence of acute infarct.  Intracranial Mass: No evidence of intracranial mass.  CSF Spaces: The ventricles, sulci, and cisterns are normal.  Calvarium and Scalp: Unremarkable.  Paranasal Sinuses, Mastoid Air Cells, and Orbits: Visualized paranasal sinuses are clear. Visualized mastoid air cells clear. Orbits are unremarkable.   CTA HEAD:  Right Anterior Circulation: The right petrous internal carotid artery is patent without stenosis. The right cavernous internal carotid artery is patent without stenosis. The right supraclinoid internal carotid artery is patent without stenosis. The A1 and A2 segments of the right anterior cerebral artery are patent without stenosis. The M1 and M2 segments of the right middle cerebral artery are patent without stenosis.  Left Anterior Circulation: The left petrous internal carotid artery is patent without stenosis. The left cavernous internal carotid artery is patent without stenosis. The left supraclinoid internal carotid artery is patent without stenosis. The A1 and A2 segments of the left anterior cerebral artery are patent without stenosis. Fenestration left A1 segment The M1 and M2 segments of the left middle cerebral artery are patent without stenosis.  Posterior Circulation: Posteriorly, the distal vertebral arteries are patent without stenosis. The basilar artery is patent without stenosis. The P1 and P2 segments of the posterior cerebral arteries are patent without stenosis.  Aneurysm/Vascular Malformation: No evidence of aneursym or vascular malformation.  Dural Venous Sinuses: The visualized dural venous sinuses are patent without evidence of dural venous sinus thrombosis.  Additional Findings: Mild mucosal thickening right maxillary sinus. Minimal mucosal thickening left maxillary sinus. ______  CTA NECK:  Right Carotid: The right common carotid artery is patent without stenosis. The right internal carotid artery is patent without stenosis. Mild beaded appearance of the high right cervical ICA.  Left Carotid: The left common carotid artery is patent without stenosis. The left internal carotid artery is patent without stenosis. Mild beaded appearance of the high left cervical ICA  Right Vertebral: The right cervical vertebral artery is patent without stenosis.  Left Vertebral: The left cervical vertebral artery is patent without stenosis.  Arch/Great Vessels: The brachiocephalic and subclavian arteries are patent without high grade stenosis.  Additional Findings: Left anterior chest cardiac device with 2 distal leads. Median sternotomy wires. __________________ IMPRESSION:  NONCONTRAST HEAD CT: 1.  No acute intracranial abnormality.  CTA HEAD AND NECK: 1.   CTA HEAD: No large vessel occlusion, aneurysm, or hemodynamically significant stenosis. Sinus disease. 2.   CTA NECK: No occlusion, dissection, aneurysm, or hemodynamically significant stenosis. 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  --- End of Report ---      RAYNA KHALIL MD; Attending Radiologist This document has been electronically signed. May  2 2024  8:20PM  , and became diaphoretic. Reports he wasn't able to get out of his chair, and the significant other called 911. The sensation lasted until he got to ED and received meclizine. Reports he had a TIA last Wednesday. Denies CP, dyspnea, blurry vision, diplopia, AMS. Pt on Xarelto for Afib and last took dose on 5/1/24. In ED patient with stable vitals, CBC unremarkable, CMP unremarkable, Trops 269 > 257, CTA head and neck done in ED, noted below. Admitted for cardiac evaluation. (03 May 2024 00:20)  pt admitted and seenby neuro, cardio and EP. neuro sx have resolved. i spoke with EP and PPM is not MRI compatible. i dw dr amezquita okay to dc home and outtp f/u with neuro and cards. [facility] as per pt he will see him in 1-2 days and discuss further and he would prefer to stay on xarelto for now. r/b/a d/w pt and all questions answered   - Assessment 53M admitted for dizziness   #Vertigo - resolved - case d/w dr horowitz and dr amezquita. unable to do mri due to incompatible PPM his leads are from 2011 - okay to dc as per neuro with outpt f/u -Neuro exam non focal. -PT evaluation -PRN meclizine -Neurology consulted, requested MRI -ppm not compatible -Fall precautions   # Acute Myocardial injury 2/2 demand ischemia -Denies CP and Trops down trending. -Obtain serial trops and ECG. -Increase atorvastatin from 10 to 80. -Last xarelto on 5/1/24, hold xarelto until further cardio recs. will d/w cardio and dc on xarelto. left msg for for dr castro and pt will d/w him at office prior to chagning to eliquis -C/w BB, amlodipine, entresto. -Cardiology consulted Dr Anderson, no further intervention Echo done clinically difficuly study. Not all cardiac structures were well visualized. 2. Left ventricular cavity is mildly dilated. Left ventricular systolic function is severely decreased with an ejection fraction visually estimated at 30 to 35 %. Global left ventricular hypokinesis. 3. Normal right ventricular cavity size and normal systolic function. 4. The left atrium is severely dilated. 5. Trace pulmonic regurgitation. 6. Mild mitral regurgitation. 7. Trace tricuspid regurgitation

## 2024-06-07 NOTE — CONSULT LETTER
[Dear  ___] : Dear  [unfilled], [Consult Letter:] : I had the pleasure of evaluating your patient, [unfilled]. [Please see my note below.] : Please see my note below. [Consult Closing:] : Thank you very much for allowing me to participate in the care of this patient.  If you have any questions, please do not hesitate to contact me. [Sincerely,] : Sincerely, [FreeTextEntry2] : Adrian Macias MD [FreeTextEntry3] : Sonu Amado MD

## 2024-06-07 NOTE — HISTORY OF PRESENT ILLNESS
[FreeTextEntry1] : 54-year-old man right-handed here for follow-up after being seen at [facility] in early May of this year when he presented with transient postural vertigo sensation of the room spinning lasted a few minutes.  No associated headache, ear pain, no ringing in the ears, no loss of hearing.  No numbness of the face or extremities, no weakness of the face or extremities.  No chest pain, no palpitation.  No recent fever or chills.  No upper respiratory symptoms.  He went to the emergency room, admitted to telemetry.  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  Cardiac echo no thrombus. Past medical history remarkable for paroxysmal atrial fibrillation, diabetes, hypertension, coronary artery disease, prior MI, status post AICD, history of TIA. Presently on Xarelto, low-dose aspirin, statin.  Tolerating the medication well.  No reported bleeding.  Episode. Reports 1 episode the month prior while driving noticed a left eye lower hemianopsia which resolved after a few minutes.  No associated eye pain, headache.  No numbness weakness of the face or extremities.  Patient did not think much of it since it corrected itself.  Denies any subsequent episodes of prior episodes. Episode of TIA back in 2006. Denies noncompliance with his medication.

## 2024-06-07 NOTE — DISCUSSION/SUMMARY
[FreeTextEntry1] : 54-year-old man with a history of TIA, diabetes, paroxysmal atrial fibrillation, coronary artery disease, seen last month for acute onset of posterior vertigo now resolved.  Imaging workup showed no acute stroke or large vessel disease.  Evidence of fibrodysplasia of the carotids.  Transient episode a month before of left I am neurosis fugax. No subsequent events. Stressed the importance of compliance, blood pressure control, advised to continue with the Xarelto and low-dose aspirin. Warned about increasing risk for bleeding. Recommended a ophthalmology evaluation of the left eye.  Less likely but to rule out any retinal disease. Advised to follow-up with primary care and cardiology. Return as needed

## 2024-06-07 NOTE — PHYSICAL EXAM
[General Appearance - Alert] : alert [General Appearance - In No Acute Distress] : in no acute distress [Oriented To Time, Place, And Person] : oriented to person, place, and time [Impaired Insight] : insight and judgment were intact [Affect] : the affect was normal [Person] : oriented to person [Place] : oriented to place [Time] : oriented to time [Concentration Intact] : normal concentrating ability [Visual Intact] : visual attention was ~T not ~L decreased [Writing A Sentence] : no difficulty writing a sentence [Fluency] : fluency intact [Comprehension] : comprehension intact [Reading] : reading intact [Past History] : adequate knowledge of personal past history [Cranial Nerves Optic (II)] : visual acuity intact bilaterally,  visual fields full to confrontation, pupils equal round and reactive to light [Cranial Nerves Oculomotor (III)] : extraocular motion intact [Cranial Nerves Trigeminal (V)] : facial sensation intact symmetrically [Cranial Nerves Facial (VII)] : face symmetrical [Cranial Nerves Vestibulocochlear (VIII)] : hearing was intact bilaterally [Cranial Nerves Glossopharyngeal (IX)] : tongue and palate midline [Cranial Nerves Accessory (XI - Cranial And Spinal)] : head turning and shoulder shrug symmetric [Cranial Nerves Hypoglossal (XII)] : there was no tongue deviation with protrusion [Motor Tone] : muscle tone was normal in all four extremities [Motor Strength] : muscle strength was normal in all four extremities [No Muscle Atrophy] : normal bulk in all four extremities [Motor Handedness Right-Handed] : the patient is right hand dominant [Paresis Pronator Drift Right-Sided] : no pronator drift on the right [Paresis Pronator Drift Left-Sided] : no pronator drift on the left [Sensation Tactile Decrease] : light touch was intact [Balance] : balance was intact [Past-pointing] : there was no past-pointing [Tremor] : no tremor present [Dysdiadochokinesia Bilaterally] : not present [Coordination - Dysmetria Impaired Finger-to-Nose Bilateral] : not present [2+] : Triceps right 2+ [1+] : Patella left 1+ [0] : Ankle jerk left 0 [Sclera] : the sclera and conjunctiva were normal [PERRL With Normal Accommodation] : pupils were equal in size, round, reactive to light, with normal accommodation [Extraocular Movements] : extraocular movements were intact [Full Visual Field] : full visual field [Neck Appearance] : the appearance of the neck was normal [] : the neck was supple [Arterial Pulses Carotid] : carotid pulses were normal with no bruits [Abnormal Walk] : normal gait

## 2024-06-21 RX ORDER — ASPIRIN ENTERIC COATED TABLETS 81 MG 81 MG/1
81 TABLET, DELAYED RELEASE ORAL DAILY
Refills: 0 | Status: ACTIVE | COMMUNITY

## 2024-08-01 ENCOUNTER — APPOINTMENT (OUTPATIENT)
Dept: ELECTROPHYSIOLOGY | Facility: CLINIC | Age: 54
End: 2024-08-01

## 2024-08-01 VITALS
HEIGHT: 67 IN | BODY MASS INDEX: 49.44 KG/M2 | WEIGHT: 315 LBS | OXYGEN SATURATION: 98 % | DIASTOLIC BLOOD PRESSURE: 71 MMHG | HEART RATE: 58 BPM | SYSTOLIC BLOOD PRESSURE: 108 MMHG

## 2024-08-01 DIAGNOSIS — Z95.810 PRESENCE OF AUTOMATIC (IMPLANTABLE) CARDIAC DEFIBRILLATOR: ICD-10-CM

## 2024-08-01 DIAGNOSIS — I50.22 CHRONIC SYSTOLIC (CONGESTIVE) HEART FAILURE: ICD-10-CM

## 2024-08-01 DIAGNOSIS — I48.0 PAROXYSMAL ATRIAL FIBRILLATION: ICD-10-CM

## 2024-08-01 PROCEDURE — 93290 INTERROG DEV EVAL ICPMS IP: CPT | Mod: 26

## 2024-08-01 PROCEDURE — 93283 PRGRMG EVAL IMPLANTABLE DFB: CPT

## 2024-08-02 ENCOUNTER — NON-APPOINTMENT (OUTPATIENT)
Age: 54
End: 2024-08-02

## 2024-08-02 RX ORDER — DOFETILIDE 0.25 MG/1
250 CAPSULE ORAL
Qty: 60 | Refills: 0 | Status: ACTIVE | COMMUNITY
Start: 2024-08-02 | End: 1900-01-01

## 2024-08-26 ENCOUNTER — INPATIENT (INPATIENT)
Facility: HOSPITAL | Age: 54
LOS: 3 days | Discharge: HOME | DRG: 310 | End: 2024-08-30
Attending: STUDENT IN AN ORGANIZED HEALTH CARE EDUCATION/TRAINING PROGRAM | Admitting: STUDENT IN AN ORGANIZED HEALTH CARE EDUCATION/TRAINING PROGRAM
Payer: MEDICARE

## 2024-08-26 VITALS
TEMPERATURE: 99 F | HEART RATE: 65 BPM | OXYGEN SATURATION: 96 % | RESPIRATION RATE: 18 BRPM | WEIGHT: 313.06 LBS | DIASTOLIC BLOOD PRESSURE: 76 MMHG | SYSTOLIC BLOOD PRESSURE: 110 MMHG

## 2024-08-26 DIAGNOSIS — I48.0 PAROXYSMAL ATRIAL FIBRILLATION: ICD-10-CM

## 2024-08-26 DIAGNOSIS — Z95.810 PRESENCE OF AUTOMATIC (IMPLANTABLE) CARDIAC DEFIBRILLATOR: Chronic | ICD-10-CM

## 2024-08-26 DIAGNOSIS — Z98.890 OTHER SPECIFIED POSTPROCEDURAL STATES: Chronic | ICD-10-CM

## 2024-08-26 LAB
ANION GAP SERPL CALC-SCNC: 6 MMOL/L — SIGNIFICANT CHANGE UP (ref 5–17)
ANION GAP SERPL CALC-SCNC: 6 MMOL/L — SIGNIFICANT CHANGE UP (ref 5–17)
BUN SERPL-MCNC: 18 MG/DL — SIGNIFICANT CHANGE UP (ref 7–23)
BUN SERPL-MCNC: 21 MG/DL — SIGNIFICANT CHANGE UP (ref 7–23)
CALCIUM SERPL-MCNC: 8.4 MG/DL — LOW (ref 8.5–10.1)
CALCIUM SERPL-MCNC: 8.8 MG/DL — SIGNIFICANT CHANGE UP (ref 8.5–10.1)
CHLORIDE SERPL-SCNC: 112 MMOL/L — HIGH (ref 96–108)
CHLORIDE SERPL-SCNC: 112 MMOL/L — HIGH (ref 96–108)
CO2 SERPL-SCNC: 25 MMOL/L — SIGNIFICANT CHANGE UP (ref 22–31)
CO2 SERPL-SCNC: 25 MMOL/L — SIGNIFICANT CHANGE UP (ref 22–31)
CREAT SERPL-MCNC: 1.36 MG/DL — HIGH (ref 0.5–1.3)
CREAT SERPL-MCNC: 1.45 MG/DL — HIGH (ref 0.5–1.3)
EGFR: 57 ML/MIN/1.73M2 — LOW
EGFR: 61 ML/MIN/1.73M2 — SIGNIFICANT CHANGE UP
GLUCOSE BLDC GLUCOMTR-MCNC: 116 MG/DL — HIGH (ref 70–99)
GLUCOSE SERPL-MCNC: 125 MG/DL — HIGH (ref 70–99)
GLUCOSE SERPL-MCNC: 139 MG/DL — HIGH (ref 70–99)
HCT VFR BLD CALC: 39.2 % — SIGNIFICANT CHANGE UP (ref 39–50)
HGB BLD-MCNC: 12.5 G/DL — LOW (ref 13–17)
MAGNESIUM SERPL-MCNC: 1.8 MG/DL — SIGNIFICANT CHANGE UP (ref 1.6–2.6)
MAGNESIUM SERPL-MCNC: 2 MG/DL — SIGNIFICANT CHANGE UP (ref 1.6–2.6)
MCHC RBC-ENTMCNC: 28 PG — SIGNIFICANT CHANGE UP (ref 27–34)
MCHC RBC-ENTMCNC: 31.9 GM/DL — LOW (ref 32–36)
MCV RBC AUTO: 87.7 FL — SIGNIFICANT CHANGE UP (ref 80–100)
PLATELET # BLD AUTO: 294 K/UL — SIGNIFICANT CHANGE UP (ref 150–400)
POTASSIUM SERPL-MCNC: 3.3 MMOL/L — LOW (ref 3.5–5.3)
POTASSIUM SERPL-MCNC: 3.6 MMOL/L — SIGNIFICANT CHANGE UP (ref 3.5–5.3)
POTASSIUM SERPL-SCNC: 3.3 MMOL/L — LOW (ref 3.5–5.3)
POTASSIUM SERPL-SCNC: 3.6 MMOL/L — SIGNIFICANT CHANGE UP (ref 3.5–5.3)
RBC # BLD: 4.47 M/UL — SIGNIFICANT CHANGE UP (ref 4.2–5.8)
RBC # FLD: 15.4 % — HIGH (ref 10.3–14.5)
SODIUM SERPL-SCNC: 143 MMOL/L — SIGNIFICANT CHANGE UP (ref 135–145)
SODIUM SERPL-SCNC: 143 MMOL/L — SIGNIFICANT CHANGE UP (ref 135–145)
WBC # BLD: 7.83 K/UL — SIGNIFICANT CHANGE UP (ref 3.8–10.5)
WBC # FLD AUTO: 7.83 K/UL — SIGNIFICANT CHANGE UP (ref 3.8–10.5)

## 2024-08-26 PROCEDURE — 84100 ASSAY OF PHOSPHORUS: CPT

## 2024-08-26 PROCEDURE — 71045 X-RAY EXAM CHEST 1 VIEW: CPT | Mod: 26

## 2024-08-26 PROCEDURE — 93005 ELECTROCARDIOGRAM TRACING: CPT

## 2024-08-26 PROCEDURE — 83735 ASSAY OF MAGNESIUM: CPT

## 2024-08-26 PROCEDURE — 36415 COLL VENOUS BLD VENIPUNCTURE: CPT

## 2024-08-26 PROCEDURE — 99222 1ST HOSP IP/OBS MODERATE 55: CPT

## 2024-08-26 PROCEDURE — 80048 BASIC METABOLIC PNL TOTAL CA: CPT

## 2024-08-26 PROCEDURE — 85027 COMPLETE CBC AUTOMATED: CPT

## 2024-08-26 PROCEDURE — 83036 HEMOGLOBIN GLYCOSYLATED A1C: CPT

## 2024-08-26 PROCEDURE — 93010 ELECTROCARDIOGRAM REPORT: CPT

## 2024-08-26 PROCEDURE — 84132 ASSAY OF SERUM POTASSIUM: CPT

## 2024-08-26 PROCEDURE — 82962 GLUCOSE BLOOD TEST: CPT

## 2024-08-26 PROCEDURE — 71045 X-RAY EXAM CHEST 1 VIEW: CPT

## 2024-08-26 RX ORDER — DEXTROSE 15 G/33 G
25 GEL IN PACKET (GRAM) ORAL ONCE
Refills: 0 | Status: DISCONTINUED | OUTPATIENT
Start: 2024-08-26 | End: 2024-08-28

## 2024-08-26 RX ORDER — AMLODIPINE BESYLATE 10 MG/1
5 TABLET ORAL DAILY
Refills: 0 | Status: DISCONTINUED | OUTPATIENT
Start: 2024-08-26 | End: 2024-08-30

## 2024-08-26 RX ORDER — POTASSIUM CHLORIDE 10 MEQ
20 TABLET, EXT RELEASE, PARTICLES/CRYSTALS ORAL ONCE
Refills: 0 | Status: COMPLETED | OUTPATIENT
Start: 2024-08-26 | End: 2024-08-26

## 2024-08-26 RX ORDER — DOFETILIDE 250 UG/1
250 CAPSULE ORAL EVERY 12 HOURS
Refills: 0 | Status: DISCONTINUED | OUTPATIENT
Start: 2024-08-27 | End: 2024-08-30

## 2024-08-26 RX ORDER — METOPROLOL TARTRATE 100 MG/1
50 TABLET ORAL
Refills: 0 | Status: DISCONTINUED | OUTPATIENT
Start: 2024-08-26 | End: 2024-08-30

## 2024-08-26 RX ORDER — MAGNESIUM, ALUMINUM HYDROXIDE 200-225/5
30 SUSPENSION, ORAL (FINAL DOSE FORM) ORAL EVERY 4 HOURS
Refills: 0 | Status: DISCONTINUED | OUTPATIENT
Start: 2024-08-26 | End: 2024-08-30

## 2024-08-26 RX ORDER — GLUCAGON INJECTION, SOLUTION 1 MG/.2ML
1 INJECTION, SOLUTION SUBCUTANEOUS ONCE
Refills: 0 | Status: DISCONTINUED | OUTPATIENT
Start: 2024-08-26 | End: 2024-08-28

## 2024-08-26 RX ORDER — ASPIRIN 81 MG
81 TABLET, DELAYED RELEASE (ENTERIC COATED) ORAL DAILY
Refills: 0 | Status: DISCONTINUED | OUTPATIENT
Start: 2024-08-26 | End: 2024-08-30

## 2024-08-26 RX ORDER — POTASSIUM CHLORIDE 10 MEQ
10 TABLET, EXT RELEASE, PARTICLES/CRYSTALS ORAL
Refills: 0 | Status: COMPLETED | OUTPATIENT
Start: 2024-08-26 | End: 2024-08-27

## 2024-08-26 RX ORDER — ONDANSETRON 2 MG/ML
4 INJECTION, SOLUTION INTRAMUSCULAR; INTRAVENOUS EVERY 8 HOURS
Refills: 0 | Status: DISCONTINUED | OUTPATIENT
Start: 2024-08-26 | End: 2024-08-26

## 2024-08-26 RX ORDER — ACETAMINOPHEN 325 MG/1
650 TABLET ORAL EVERY 6 HOURS
Refills: 0 | Status: DISCONTINUED | OUTPATIENT
Start: 2024-08-26 | End: 2024-08-30

## 2024-08-26 RX ORDER — MAGNESIUM OXIDE TAB 400 MG (240 MG ELEMENTAL MG) 400 (240 MG) MG
400 TAB ORAL AT BEDTIME
Refills: 0 | Status: DISCONTINUED | OUTPATIENT
Start: 2024-08-26 | End: 2024-08-30

## 2024-08-26 RX ORDER — SACUBITRIL AND VALSARTAN 49; 51 MG/1; MG/1
1 TABLET, FILM COATED ORAL
Refills: 0 | Status: DISCONTINUED | OUTPATIENT
Start: 2024-08-26 | End: 2024-08-30

## 2024-08-26 RX ORDER — DEXTROSE 15 G/33 G
12.5 GEL IN PACKET (GRAM) ORAL ONCE
Refills: 0 | Status: DISCONTINUED | OUTPATIENT
Start: 2024-08-26 | End: 2024-08-28

## 2024-08-26 RX ORDER — FUROSEMIDE 40 MG
40 TABLET ORAL DAILY
Refills: 0 | Status: DISCONTINUED | OUTPATIENT
Start: 2024-08-26 | End: 2024-08-30

## 2024-08-26 RX ORDER — POTASSIUM CHLORIDE 10 MEQ
40 TABLET, EXT RELEASE, PARTICLES/CRYSTALS ORAL ONCE
Refills: 0 | Status: COMPLETED | OUTPATIENT
Start: 2024-08-26 | End: 2024-08-26

## 2024-08-26 RX ORDER — SPIRONOLACTONE 25 MG/1
25 TABLET, FILM COATED ORAL DAILY
Refills: 0 | Status: DISCONTINUED | OUTPATIENT
Start: 2024-08-26 | End: 2024-08-30

## 2024-08-26 RX ORDER — RIVAROXABAN 10 MG/1
20 TABLET, FILM COATED ORAL DAILY
Refills: 0 | Status: DISCONTINUED | OUTPATIENT
Start: 2024-08-26 | End: 2024-08-26

## 2024-08-26 RX ORDER — RIVAROXABAN 10 MG/1
20 TABLET, FILM COATED ORAL
Refills: 0 | Status: DISCONTINUED | OUTPATIENT
Start: 2024-08-27 | End: 2024-08-30

## 2024-08-26 RX ORDER — POTASSIUM CHLORIDE 10 MEQ
10 TABLET, EXT RELEASE, PARTICLES/CRYSTALS ORAL ONCE
Refills: 0 | Status: COMPLETED | OUTPATIENT
Start: 2024-08-26 | End: 2024-08-26

## 2024-08-26 RX ORDER — DEXTROSE 15 G/33 G
15 GEL IN PACKET (GRAM) ORAL ONCE
Refills: 0 | Status: DISCONTINUED | OUTPATIENT
Start: 2024-08-26 | End: 2024-08-28

## 2024-08-26 RX ADMIN — Medication 100 MILLIEQUIVALENT(S): at 23:52

## 2024-08-26 RX ADMIN — Medication 20 MILLIEQUIVALENT(S): at 23:03

## 2024-08-26 RX ADMIN — Medication 100 GRAM(S): at 15:35

## 2024-08-26 RX ADMIN — Medication 40 MILLIGRAM(S): at 21:50

## 2024-08-26 RX ADMIN — Medication 40 MILLIEQUIVALENT(S): at 15:26

## 2024-08-26 RX ADMIN — MAGNESIUM OXIDE TAB 400 MG (240 MG ELEMENTAL MG) 400 MILLIGRAM(S): 400 (240 MG) TAB at 21:50

## 2024-08-26 RX ADMIN — Medication 100 MILLIEQUIVALENT(S): at 15:27

## 2024-08-26 NOTE — CONSULT NOTE ADULT - SUBJECTIVE AND OBJECTIVE BOX
HPI: 53 yo M with PMhx of NICMP, chronic HFrEF (LVEF 30-35%), s/p dual chamber ICD ([employer])  HTN, DM, obesity, TIA, PAF on xarelto, h/o DCCV,  TRIP non compliant with CPAP, Pt has been c/o MOMIN, ICD ahowed remains in AFIb since Feb. Admitted for tikosyn loading followed by DCCV on 3rd day.  Pt is OOB, denies chest pain/acute SOB/palpitations at rest  Has been compliant with meds including xarelto.  Pt has been lost 10 lbs recently    tele: AFib 60-70' s bpm with PVC  EKG: Afib 69bpm, V-paced, QRS 168ms, QT 480ms/QTC 514ms (corrected for paced: 446ms)  Echo ( 5/3/24) LVEF 30-35%, sev dilated LA  ST (March 2024) severe defect, no ischemia  Cardio-       PAST MEDICAL & SURGICAL HISTORY:  Hypertension, unspecified type      Hyperlipidemia, unspecified hyperlipidemia type      Ventricular tachycardia      Chronic systolic congestive heart failure      Paroxysmal atrial fibrillation      Obstructive sleep apnea syndrome  No sleep device      Morbid obesity      Type 2 diabetes mellitus without complication, without long-term current use of insulin      ASD (atrial septal defect)  history of . repaired as a child      AICD (automatic cardioverter/defibrillator) present      AICD (automatic cardioverter/defibrillator) present  5/2011      H/O heart surgery  ASD repair at 3 years old          FAMILY HISTORY:  Family history of heart disease (Father)        SOCIAL HISTORY: lives at home  Allergies    No Known Allergies        MEDICATIONS  (STANDING):  amLODIPine   Tablet 5 milliGRAM(s) Oral daily  aspirin enteric coated 81 milliGRAM(s) Oral daily  atorvastatin 40 milliGRAM(s) Oral at bedtime  dextrose 5%. 1000 milliLiter(s) (50 mL/Hr) IV Continuous <Continuous>  dextrose 5%. 1000 milliLiter(s) (100 mL/Hr) IV Continuous <Continuous>  dextrose 50% Injectable 25 Gram(s) IV Push once  dextrose 50% Injectable 12.5 Gram(s) IV Push once  dextrose 50% Injectable 25 Gram(s) IV Push once  furosemide    Tablet 40 milliGRAM(s) Oral daily  glucagon  Injectable 1 milliGRAM(s) IntraMuscular once  insulin lispro (ADMELOG) corrective regimen sliding scale   SubCutaneous three times a day before meals  metoprolol tartrate 50 milliGRAM(s) Oral two times a day  rivaroxaban 20 milliGRAM(s) Oral daily  sacubitril 49 mG/valsartan 51 mG 1 Tablet(s) Oral two times a day  spironolactone 25 milliGRAM(s) Oral daily    MEDICATIONS  (PRN):  acetaminophen     Tablet .. 650 milliGRAM(s) Oral every 6 hours PRN Temp greater or equal to 38C (100.4F), Mild Pain (1 - 3)  aluminum hydroxide/magnesium hydroxide/simethicone Suspension 30 milliLiter(s) Oral every 4 hours PRN Dyspepsia  dextrose Oral Gel 15 Gram(s) Oral once PRN Blood Glucose LESS THAN 70 milliGRAM(s)/deciliter  melatonin 3 milliGRAM(s) Oral at bedtime PRN Insomnia  ondansetron Injectable 4 milliGRAM(s) IV Push every 8 hours PRN Nausea and/or Vomiting    ROS: All other ROS is negative unless indicated above.    Physical Exam:  Vital Signs Last 24 Hrs  T(C): 37 (26 Aug 2024 13:33), Max: 37 (26 Aug 2024 13:33)  T(F): 98.6 (26 Aug 2024 13:33), Max: 98.6 (26 Aug 2024 13:33)  HR: 65 (26 Aug 2024 13:33) (65 - 65)  BP: 110/76 (26 Aug 2024 13:33) (110/76 - 110/76)  BP(mean): 85 (26 Aug 2024 13:33) (85 - 85)  RR: 18 (26 Aug 2024 13:33) (18 - 18)  SpO2: 96% (26 Aug 2024 13:33) (96% - 96%)    Parameters below as of 26 Aug 2024 13:33  Patient On (Oxygen Delivery Method): room air      Constitutional: well developed,  no deformities and no acute distress    Neurological: Alert & Oriented x 3, ROSS, no focal deficits    HEENT: NC/AT, PERRLA, EOMI,  Neck supple.    Respiratory: CTA B/L, No wheezing/crackles/rhonchi    Cardiovascular: (+) S1 & S2,     Gastrointestinal: soft, NT, nondistended, (+) BS    Genitourinary: non distended bladder, voiding freely    Extremities: No pedal edema, No clubbing, No cyanosis    Skin:  normal skin color and pigmentation, no skin lesions          LABS:                        12.5   7.83  )-----------( 294      ( 26 Aug 2024 13:55 )             39.2                HPI: 53 yo M with PMhx of NICMP, chronic HFrEF (LVEF 30-35%), s/p dual chamber ICD ([employer])  HTN, DM, obesity, TIA, PAF on xarelto, h/o DCCV,  TRIP non compliant with CPAP, Pt has been c/o MOMIN, ICD ahowed remains in AFIb since Feb. Admitted for tikosyn loading followed by DCCV on 3rd day.  Pt is OOB, denies chest pain/acute SOB/palpitations at rest  Has been compliant with meds including xarelto.  Pt has been lost 10 lbs recently    tele: AFib 60-70' s bpm with PVC  EKG: Afib 69bpm, V-paced, QRS 168ms, QT 480ms/QTC 514ms (corrected for paced: 446ms)  Echo ( 5/3/24) LVEF 30-35%, sev dilated LA  ST (March 2024) severe defect, no ischemia  Cardio-       PAST MEDICAL & SURGICAL HISTORY:  Hypertension, unspecified type      Hyperlipidemia, unspecified hyperlipidemia type      Ventricular tachycardia      Chronic systolic congestive heart failure      Paroxysmal atrial fibrillation      Obstructive sleep apnea syndrome  No sleep device      Morbid obesity      Type 2 diabetes mellitus without complication, without long-term current use of insulin      ASD (atrial septal defect)  history of . repaired as a child      AICD (automatic cardioverter/defibrillator) present      AICD (automatic cardioverter/defibrillator) present  5/2011      H/O heart surgery  ASD repair at 3 years old          FAMILY HISTORY:  Family history of heart disease (Father)        SOCIAL HISTORY: lives at home  Allergies    No Known Allergies        MEDICATIONS  (STANDING):  amLODIPine   Tablet 5 milliGRAM(s) Oral daily  aspirin enteric coated 81 milliGRAM(s) Oral daily  atorvastatin 40 milliGRAM(s) Oral at bedtime  dextrose 5%. 1000 milliLiter(s) (50 mL/Hr) IV Continuous <Continuous>  dextrose 5%. 1000 milliLiter(s) (100 mL/Hr) IV Continuous <Continuous>  dextrose 50% Injectable 25 Gram(s) IV Push once  dextrose 50% Injectable 12.5 Gram(s) IV Push once  dextrose 50% Injectable 25 Gram(s) IV Push once  furosemide    Tablet 40 milliGRAM(s) Oral daily  glucagon  Injectable 1 milliGRAM(s) IntraMuscular once  insulin lispro (ADMELOG) corrective regimen sliding scale   SubCutaneous three times a day before meals  metoprolol tartrate 50 milliGRAM(s) Oral two times a day  rivaroxaban 20 milliGRAM(s) Oral daily  sacubitril 49 mG/valsartan 51 mG 1 Tablet(s) Oral two times a day  spironolactone 25 milliGRAM(s) Oral daily    MEDICATIONS  (PRN):  acetaminophen     Tablet .. 650 milliGRAM(s) Oral every 6 hours PRN Temp greater or equal to 38C (100.4F), Mild Pain (1 - 3)  aluminum hydroxide/magnesium hydroxide/simethicone Suspension 30 milliLiter(s) Oral every 4 hours PRN Dyspepsia  dextrose Oral Gel 15 Gram(s) Oral once PRN Blood Glucose LESS THAN 70 milliGRAM(s)/deciliter  melatonin 3 milliGRAM(s) Oral at bedtime PRN Insomnia  ondansetron Injectable 4 milliGRAM(s) IV Push every 8 hours PRN Nausea and/or Vomiting    ROS: All other ROS is negative unless indicated above.    Physical Exam:  Vital Signs Last 24 Hrs  T(C): 37 (26 Aug 2024 13:33), Max: 37 (26 Aug 2024 13:33)  T(F): 98.6 (26 Aug 2024 13:33), Max: 98.6 (26 Aug 2024 13:33)  HR: 65 (26 Aug 2024 13:33) (65 - 65)  BP: 110/76 (26 Aug 2024 13:33) (110/76 - 110/76)  BP(mean): 85 (26 Aug 2024 13:33) (85 - 85)  RR: 18 (26 Aug 2024 13:33) (18 - 18)  SpO2: 96% (26 Aug 2024 13:33) (96% - 96%)    Parameters below as of 26 Aug 2024 13:33  Patient On (Oxygen Delivery Method): room air      Constitutional: well developed,  no deformities and no acute distress    Neurological: Alert & Oriented x 3, ROSS, no focal deficits    HEENT: NC/AT, PERRLA, EOMI,  Neck supple.    Respiratory: CTA B/L, No wheezing/crackles/rhonchi    Cardiovascular: (+) irregular S1 & S2,(+) Lt side ICD    Gastrointestinal: soft, NT, nondistended, (+) BS    Genitourinary: non distended bladder, voiding freely    Extremities: No pedal edema, No clubbing, No cyanosis    Skin:  normal skin color and pigmentation, no skin lesions          LABS:                        12.5   7.83  )-----------( 294      ( 26 Aug 2024 13:55 )             39.2     BMP/Mg-pending

## 2024-08-26 NOTE — CONSULT NOTE ADULT - ASSESSMENT
55 yo M with above PMHx presented for tikosyn loading followed by DCCV on 3rd day for PAF.  - start tikosyn 250mcg po q12hrs.12 leads EKG in 2 hrs post each dose for QTC (copay $47)  -call EP if QTC>500ms or >50ms from baseline.   -Avoid QT prolonging agents.   -keep K+>4, Mg>2. Pt needs 6 doses in-pt loading.  -continue metoprolol 50mg po BID  - uninterrupted xarelto   - GDMT for chronic HFrEF  - keep NPO post MN on 8/28 for DCCV on 8/29  - Pt states not using CPAP think he sleeps good, advised to repeat sleep study as outpt, pt verbalized understandings.  Plan d/w pt/hospitalist/RN 53 yo M with above PMHx presented for tikosyn loading followed by DCCV on 3rd day for PAF.  - start tikosyn 250mcg po q12hrs tonight if K>4.0, Mg >2.0 (copay $47)  -12 leads EKG in 2 hrs post each dose for QTC   -call EP if QTC>500ms or >50ms from baseline.   -Avoid QT prolonging agents.   -keep K+>4, Mg>2. Pt needs 6 doses in-pt loading.  -continue metoprolol 50mg po BID  - uninterrupted xarelto   - GDMT for chronic HFrEF  - keep NPO post MN on 8/28 for DCCV on 8/29  - Pt states not using CPAP think he sleeps good, advised to repeat sleep study as outpt, pt verbalized understandings.  Plan d/w pt/hospitalist/RN

## 2024-08-26 NOTE — PHARMACOTHERAPY INTERVENTION NOTE - COMMENTS
Confirmed home medications with patient and Dr. [facility]. Updated patient allergies and preferred outpatient pharmacy and documented in Outpatient Medication Review.

## 2024-08-26 NOTE — H&P ADULT - ASSESSMENT
55 yo M with pmh  aicd, asd, HFrEF, htn, pafib, linus sent in by Dr Atkins for Tikosyn loading.   Pt planned to 3 day load followed by DCCV 8/29  PT otherwise denies any complaints at this time  Labs pending. Pt direct admission to 3E  EKG/CXR pending      #Chronic Atrial fibrillation  #Non ischemic cardiomyopathy  #HF  - admit to tele  - Tikosyn loading  - EKG for QTC  - Monitor K/MAg  - continue BB/DOAC  - EP to monitor EKG for Tikosyn dosing  - NPO a MN wednesday for DCCV thursday 8/29        #HTN/NIDDM/LINUS/HLD  - resume home meds        dvt px: DOAC

## 2024-08-26 NOTE — H&P ADULT - NSHPPHYSICALEXAM_GEN_ALL_CORE
ICU Vital Signs Last 24 Hrs  T(C): 37 (26 Aug 2024 13:33), Max: 37 (26 Aug 2024 13:33)  T(F): 98.6 (26 Aug 2024 13:33), Max: 98.6 (26 Aug 2024 13:33)  HR: 65 (26 Aug 2024 13:33) (65 - 65)  BP: 110/76 (26 Aug 2024 13:33) (110/76 - 110/76)  BP(mean): 85 (26 Aug 2024 13:33) (85 - 85)  ABP: --  ABP(mean): --  RR: 18 (26 Aug 2024 13:33) (18 - 18)  SpO2: 96% (26 Aug 2024 13:33) (96% - 96%)    O2 Parameters below as of 26 Aug 2024 13:33  Patient On (Oxygen Delivery Method): room air            PHYSICAL EXAM:    Constitutional: NAD, awake and alert  HEENT: PERR, EOMI, Normal Hearing, MMM  Neck: Soft and supple, No LAD, No JVD  Respiratory: Breath sounds are clear bilaterally, No wheezing, rales or rhonchi  Cardiovascular: S1 and S2, regular rate and rhythm, no Murmurs, gallops or rubs  Gastrointestinal: Bowel Sounds present, soft, nontender, nondistended, no guarding, no rebound  Extremities: No peripheral edema  Vascular: 2+ peripheral pulses  Neurological: A/O x 3, no focal deficits  Musculoskeletal: 5/5 strength b/l upper and lower extremities  Skin: No rashes

## 2024-08-26 NOTE — H&P ADULT - NSHPLABSRESULTS_GEN_ALL_CORE
LABS: All Labs Reviewed:                        12.5   7.83  )-----------( 294      ( 26 Aug 2024 13:55 )             39.2                     Blood Culture:

## 2024-08-26 NOTE — H&P ADULT - IN ACCORDANCE WITH [place] STATE LAW, WE OFFER EVERY PATIENT A HEPATITIS C TEST. WOULD YOU LIKE TO BE TESTED TODAY?
One-to-one maintained Yes  Mental status assessment eating  Patient response to interventions NA  Criteria for discontinuation of 1:1 No suicidal behaviors.   Rational for continuance of 1:1 precautions Continued suicidal thoughts Opt out

## 2024-08-26 NOTE — H&P ADULT - HISTORY OF PRESENT ILLNESS
53 yo M with pmh  aicd, asd, HFrEF, htn, pafib, linus sent in by Dr Atkins for Tikosyn loading.   Pt planned to 3 day load followed by YEEV 8/29  PT otherwise denies any complaints at this time  Labs pending. Pt direct admission to   EKG/CXR pending              Patient History:    Past Medical, Past Surgical, and Family History:  PAST MEDICAL HISTORY:  AICD (automatic cardioverter/defibrillator) present     ASD (atrial septal defect) history of . repaired as a child    Chronic systolic congestive heart failure     Hyperlipidemia, unspecified hyperlipidemia type     Hypertension, unspecified type     Morbid obesity     Obstructive sleep apnea syndrome No sleep device    Paroxysmal atrial fibrillation     Type 2 diabetes mellitus without complication, without long-term current use of insulin     Ventricular tachycardia.     PAST SURGICAL HISTORY:  AICD (automatic cardioverter/defibrillator) present 5/2011    H/O heart surgery ASD repair at 3 years old.     FAMILY HISTORY:  Father  Still living? No  Family history of heart disease, Age at diagnosis: Age Unknown.     Social History:  · Substance use	No     Tobacco Screening:  · Core Measure Site	Yes  · Has the patient used tobacco in the past 30 days?	No    Risk Assessment:    Present on Admission:  Deep Venous Thrombosis	no  Pulmonary Embolus	no

## 2024-08-26 NOTE — PROVIDER CONTACT NOTE (OTHER) - ACTION/TREATMENT ORDERED:
Per TILA Hunt, give 20mEq PO and 10mEq x3 IV KCl, then repeat K. Give Tikosyn if repeat K>4. Per NP Marjan Hunt, give 20mEq PO and 10mEq x2 IV KCl, then repeat K. Give Tikosyn if repeat K>4.

## 2024-08-27 LAB
A1C WITH ESTIMATED AVERAGE GLUCOSE RESULT: 6.2 % — HIGH (ref 4–5.6)
ANION GAP SERPL CALC-SCNC: 6 MMOL/L — SIGNIFICANT CHANGE UP (ref 5–17)
BUN SERPL-MCNC: 19 MG/DL — SIGNIFICANT CHANGE UP (ref 7–23)
CALCIUM SERPL-MCNC: 8.6 MG/DL — SIGNIFICANT CHANGE UP (ref 8.5–10.1)
CHLORIDE SERPL-SCNC: 110 MMOL/L — HIGH (ref 96–108)
CO2 SERPL-SCNC: 26 MMOL/L — SIGNIFICANT CHANGE UP (ref 22–31)
CREAT SERPL-MCNC: 1.19 MG/DL — SIGNIFICANT CHANGE UP (ref 0.5–1.3)
EGFR: 72 ML/MIN/1.73M2 — SIGNIFICANT CHANGE UP
ESTIMATED AVERAGE GLUCOSE: 131 MG/DL — HIGH (ref 68–114)
GLUCOSE BLDC GLUCOMTR-MCNC: 103 MG/DL — HIGH (ref 70–99)
GLUCOSE BLDC GLUCOMTR-MCNC: 107 MG/DL — HIGH (ref 70–99)
GLUCOSE BLDC GLUCOMTR-MCNC: 127 MG/DL — HIGH (ref 70–99)
GLUCOSE BLDC GLUCOMTR-MCNC: 162 MG/DL — HIGH (ref 70–99)
GLUCOSE BLDC GLUCOMTR-MCNC: 87 MG/DL — SIGNIFICANT CHANGE UP (ref 70–99)
GLUCOSE SERPL-MCNC: 98 MG/DL — SIGNIFICANT CHANGE UP (ref 70–99)
MAGNESIUM SERPL-MCNC: 2.1 MG/DL — SIGNIFICANT CHANGE UP (ref 1.6–2.6)
POTASSIUM SERPL-MCNC: 3.6 MMOL/L — SIGNIFICANT CHANGE UP (ref 3.5–5.3)
POTASSIUM SERPL-MCNC: 4.4 MMOL/L — SIGNIFICANT CHANGE UP (ref 3.5–5.3)
POTASSIUM SERPL-SCNC: 3.6 MMOL/L — SIGNIFICANT CHANGE UP (ref 3.5–5.3)
POTASSIUM SERPL-SCNC: 4.4 MMOL/L — SIGNIFICANT CHANGE UP (ref 3.5–5.3)
SODIUM SERPL-SCNC: 142 MMOL/L — SIGNIFICANT CHANGE UP (ref 135–145)

## 2024-08-27 PROCEDURE — 99232 SBSQ HOSP IP/OBS MODERATE 35: CPT

## 2024-08-27 PROCEDURE — 93010 ELECTROCARDIOGRAM REPORT: CPT

## 2024-08-27 RX ORDER — POTASSIUM CHLORIDE 10 MEQ
40 TABLET, EXT RELEASE, PARTICLES/CRYSTALS ORAL ONCE
Refills: 0 | Status: COMPLETED | OUTPATIENT
Start: 2024-08-27 | End: 2024-08-27

## 2024-08-27 RX ORDER — POTASSIUM CHLORIDE 10 MEQ
10 TABLET, EXT RELEASE, PARTICLES/CRYSTALS ORAL
Refills: 0 | Status: COMPLETED | OUTPATIENT
Start: 2024-08-27 | End: 2024-08-27

## 2024-08-27 RX ADMIN — SACUBITRIL AND VALSARTAN 1 TABLET(S): 49; 51 TABLET, FILM COATED ORAL at 22:11

## 2024-08-27 RX ADMIN — Medication 81 MILLIGRAM(S): at 08:50

## 2024-08-27 RX ADMIN — Medication 100 MILLIEQUIVALENT(S): at 02:23

## 2024-08-27 RX ADMIN — METOPROLOL TARTRATE 50 MILLIGRAM(S): 100 TABLET ORAL at 22:11

## 2024-08-27 RX ADMIN — RIVAROXABAN 20 MILLIGRAM(S): 10 TABLET, FILM COATED ORAL at 17:45

## 2024-08-27 RX ADMIN — SPIRONOLACTONE 25 MILLIGRAM(S): 25 TABLET, FILM COATED ORAL at 08:50

## 2024-08-27 RX ADMIN — Medication 40 MILLIEQUIVALENT(S): at 06:23

## 2024-08-27 RX ADMIN — METOPROLOL TARTRATE 50 MILLIGRAM(S): 100 TABLET ORAL at 08:50

## 2024-08-27 RX ADMIN — DOFETILIDE 250 MICROGRAM(S): 250 CAPSULE ORAL at 22:09

## 2024-08-27 RX ADMIN — MAGNESIUM OXIDE TAB 400 MG (240 MG ELEMENTAL MG) 400 MILLIGRAM(S): 400 (240 MG) TAB at 22:12

## 2024-08-27 RX ADMIN — Medication 40 MILLIGRAM(S): at 22:12

## 2024-08-27 RX ADMIN — Medication 100 MILLIEQUIVALENT(S): at 08:42

## 2024-08-27 RX ADMIN — Medication 100 MILLIEQUIVALENT(S): at 06:34

## 2024-08-27 RX ADMIN — DOFETILIDE 250 MICROGRAM(S): 250 CAPSULE ORAL at 11:20

## 2024-08-27 NOTE — PROVIDER CONTACT NOTE (OTHER) - SITUATION
Pt in for Tikosyn load, however order is suspended pending  repeat labs, Mag 2, K 3.6
after receiving 20 Meq x1 PO and 2 k riders repeat K was still 3.6. Did not receive Tikosyn loading

## 2024-08-28 LAB
ANION GAP SERPL CALC-SCNC: 5 MMOL/L — SIGNIFICANT CHANGE UP (ref 5–17)
BUN SERPL-MCNC: 19 MG/DL — SIGNIFICANT CHANGE UP (ref 7–23)
CALCIUM SERPL-MCNC: 9.1 MG/DL — SIGNIFICANT CHANGE UP (ref 8.5–10.1)
CHLORIDE SERPL-SCNC: 111 MMOL/L — HIGH (ref 96–108)
CO2 SERPL-SCNC: 24 MMOL/L — SIGNIFICANT CHANGE UP (ref 22–31)
CREAT SERPL-MCNC: 1.21 MG/DL — SIGNIFICANT CHANGE UP (ref 0.5–1.3)
EGFR: 70 ML/MIN/1.73M2 — SIGNIFICANT CHANGE UP
GLUCOSE BLDC GLUCOMTR-MCNC: 100 MG/DL — HIGH (ref 70–99)
GLUCOSE SERPL-MCNC: 112 MG/DL — HIGH (ref 70–99)
MAGNESIUM SERPL-MCNC: 2.3 MG/DL — SIGNIFICANT CHANGE UP (ref 1.6–2.6)
POTASSIUM SERPL-MCNC: 4.5 MMOL/L — SIGNIFICANT CHANGE UP (ref 3.5–5.3)
POTASSIUM SERPL-SCNC: 4.5 MMOL/L — SIGNIFICANT CHANGE UP (ref 3.5–5.3)
SODIUM SERPL-SCNC: 140 MMOL/L — SIGNIFICANT CHANGE UP (ref 135–145)

## 2024-08-28 PROCEDURE — 99232 SBSQ HOSP IP/OBS MODERATE 35: CPT

## 2024-08-28 PROCEDURE — 93010 ELECTROCARDIOGRAM REPORT: CPT

## 2024-08-28 RX ADMIN — Medication 81 MILLIGRAM(S): at 09:05

## 2024-08-28 RX ADMIN — MAGNESIUM OXIDE TAB 400 MG (240 MG ELEMENTAL MG) 400 MILLIGRAM(S): 400 (240 MG) TAB at 22:05

## 2024-08-28 RX ADMIN — Medication 40 MILLIGRAM(S): at 22:06

## 2024-08-28 RX ADMIN — SACUBITRIL AND VALSARTAN 1 TABLET(S): 49; 51 TABLET, FILM COATED ORAL at 22:08

## 2024-08-28 RX ADMIN — METOPROLOL TARTRATE 50 MILLIGRAM(S): 100 TABLET ORAL at 22:06

## 2024-08-28 RX ADMIN — AMLODIPINE BESYLATE 5 MILLIGRAM(S): 10 TABLET ORAL at 09:04

## 2024-08-28 RX ADMIN — Medication 3 MILLIGRAM(S): at 22:05

## 2024-08-28 RX ADMIN — SACUBITRIL AND VALSARTAN 1 TABLET(S): 49; 51 TABLET, FILM COATED ORAL at 09:02

## 2024-08-28 RX ADMIN — METOPROLOL TARTRATE 50 MILLIGRAM(S): 100 TABLET ORAL at 09:02

## 2024-08-28 RX ADMIN — DOFETILIDE 250 MICROGRAM(S): 250 CAPSULE ORAL at 09:02

## 2024-08-28 RX ADMIN — RIVAROXABAN 20 MILLIGRAM(S): 10 TABLET, FILM COATED ORAL at 17:00

## 2024-08-28 RX ADMIN — DOFETILIDE 250 MICROGRAM(S): 250 CAPSULE ORAL at 22:07

## 2024-08-28 RX ADMIN — SPIRONOLACTONE 25 MILLIGRAM(S): 25 TABLET, FILM COATED ORAL at 09:02

## 2024-08-28 RX ADMIN — Medication 40 MILLIGRAM(S): at 09:03

## 2024-08-29 LAB
ANION GAP SERPL CALC-SCNC: 4 MMOL/L — LOW (ref 5–17)
BUN SERPL-MCNC: 20 MG/DL — SIGNIFICANT CHANGE UP (ref 7–23)
CALCIUM SERPL-MCNC: 8.7 MG/DL — SIGNIFICANT CHANGE UP (ref 8.5–10.1)
CHLORIDE SERPL-SCNC: 110 MMOL/L — HIGH (ref 96–108)
CO2 SERPL-SCNC: 25 MMOL/L — SIGNIFICANT CHANGE UP (ref 22–31)
CREAT SERPL-MCNC: 1.16 MG/DL — SIGNIFICANT CHANGE UP (ref 0.5–1.3)
EGFR: 74 ML/MIN/1.73M2 — SIGNIFICANT CHANGE UP
GLUCOSE SERPL-MCNC: 101 MG/DL — HIGH (ref 70–99)
MAGNESIUM SERPL-MCNC: 2.3 MG/DL — SIGNIFICANT CHANGE UP (ref 1.6–2.6)
PHOSPHATE SERPL-MCNC: 3.4 MG/DL — SIGNIFICANT CHANGE UP (ref 2.5–4.5)
POTASSIUM SERPL-MCNC: 4.4 MMOL/L — SIGNIFICANT CHANGE UP (ref 3.5–5.3)
POTASSIUM SERPL-SCNC: 4.4 MMOL/L — SIGNIFICANT CHANGE UP (ref 3.5–5.3)
SODIUM SERPL-SCNC: 139 MMOL/L — SIGNIFICANT CHANGE UP (ref 135–145)

## 2024-08-29 PROCEDURE — 99232 SBSQ HOSP IP/OBS MODERATE 35: CPT

## 2024-08-29 PROCEDURE — 93010 ELECTROCARDIOGRAM REPORT: CPT

## 2024-08-29 RX ADMIN — Medication 40 MILLIGRAM(S): at 21:53

## 2024-08-29 RX ADMIN — Medication 40 MILLIGRAM(S): at 09:25

## 2024-08-29 RX ADMIN — AMLODIPINE BESYLATE 5 MILLIGRAM(S): 10 TABLET ORAL at 09:25

## 2024-08-29 RX ADMIN — DOFETILIDE 250 MICROGRAM(S): 250 CAPSULE ORAL at 21:53

## 2024-08-29 RX ADMIN — SACUBITRIL AND VALSARTAN 1 TABLET(S): 49; 51 TABLET, FILM COATED ORAL at 21:53

## 2024-08-29 RX ADMIN — RIVAROXABAN 20 MILLIGRAM(S): 10 TABLET, FILM COATED ORAL at 16:37

## 2024-08-29 RX ADMIN — MAGNESIUM OXIDE TAB 400 MG (240 MG ELEMENTAL MG) 400 MILLIGRAM(S): 400 (240 MG) TAB at 21:53

## 2024-08-29 RX ADMIN — METOPROLOL TARTRATE 50 MILLIGRAM(S): 100 TABLET ORAL at 21:53

## 2024-08-29 RX ADMIN — SACUBITRIL AND VALSARTAN 1 TABLET(S): 49; 51 TABLET, FILM COATED ORAL at 09:25

## 2024-08-29 RX ADMIN — METOPROLOL TARTRATE 50 MILLIGRAM(S): 100 TABLET ORAL at 09:30

## 2024-08-29 RX ADMIN — DOFETILIDE 250 MICROGRAM(S): 250 CAPSULE ORAL at 09:25

## 2024-08-29 RX ADMIN — SPIRONOLACTONE 25 MILLIGRAM(S): 25 TABLET, FILM COATED ORAL at 09:30

## 2024-08-29 RX ADMIN — Medication 81 MILLIGRAM(S): at 09:25

## 2024-08-30 ENCOUNTER — TRANSCRIPTION ENCOUNTER (OUTPATIENT)
Age: 54
End: 2024-08-30

## 2024-08-30 VITALS
DIASTOLIC BLOOD PRESSURE: 84 MMHG | SYSTOLIC BLOOD PRESSURE: 115 MMHG | RESPIRATION RATE: 18 BRPM | OXYGEN SATURATION: 99 % | HEART RATE: 65 BPM | TEMPERATURE: 98 F

## 2024-08-30 LAB
ANION GAP SERPL CALC-SCNC: 5 MMOL/L — SIGNIFICANT CHANGE UP (ref 5–17)
BUN SERPL-MCNC: 20 MG/DL — SIGNIFICANT CHANGE UP (ref 7–23)
CALCIUM SERPL-MCNC: 9.2 MG/DL — SIGNIFICANT CHANGE UP (ref 8.5–10.1)
CHLORIDE SERPL-SCNC: 108 MMOL/L — SIGNIFICANT CHANGE UP (ref 96–108)
CO2 SERPL-SCNC: 24 MMOL/L — SIGNIFICANT CHANGE UP (ref 22–31)
CREAT SERPL-MCNC: 1.12 MG/DL — SIGNIFICANT CHANGE UP (ref 0.5–1.3)
EGFR: 77 ML/MIN/1.73M2 — SIGNIFICANT CHANGE UP
GLUCOSE SERPL-MCNC: 101 MG/DL — HIGH (ref 70–99)
MAGNESIUM SERPL-MCNC: 2.5 MG/DL — SIGNIFICANT CHANGE UP (ref 1.6–2.6)
POTASSIUM SERPL-MCNC: 4.3 MMOL/L — SIGNIFICANT CHANGE UP (ref 3.5–5.3)
POTASSIUM SERPL-SCNC: 4.3 MMOL/L — SIGNIFICANT CHANGE UP (ref 3.5–5.3)
SODIUM SERPL-SCNC: 137 MMOL/L — SIGNIFICANT CHANGE UP (ref 135–145)

## 2024-08-30 PROCEDURE — 93010 ELECTROCARDIOGRAM REPORT: CPT | Mod: 76

## 2024-08-30 PROCEDURE — 99239 HOSP IP/OBS DSCHRG MGMT >30: CPT

## 2024-08-30 RX ORDER — MAGNESIUM OXIDE TAB 400 MG (240 MG ELEMENTAL MG) 400 (240 MG) MG
1 TAB ORAL
Qty: 30 | Refills: 0
Start: 2024-08-30

## 2024-08-30 RX ORDER — DOFETILIDE 250 UG/1
1 CAPSULE ORAL
Qty: 60 | Refills: 0
Start: 2024-08-30

## 2024-08-30 RX ADMIN — METOPROLOL TARTRATE 50 MILLIGRAM(S): 100 TABLET ORAL at 09:34

## 2024-08-30 RX ADMIN — SPIRONOLACTONE 25 MILLIGRAM(S): 25 TABLET, FILM COATED ORAL at 09:32

## 2024-08-30 RX ADMIN — AMLODIPINE BESYLATE 5 MILLIGRAM(S): 10 TABLET ORAL at 09:32

## 2024-08-30 RX ADMIN — Medication 40 MILLIGRAM(S): at 09:32

## 2024-08-30 RX ADMIN — SACUBITRIL AND VALSARTAN 1 TABLET(S): 49; 51 TABLET, FILM COATED ORAL at 09:31

## 2024-08-30 RX ADMIN — DOFETILIDE 250 MICROGRAM(S): 250 CAPSULE ORAL at 09:32

## 2024-08-30 RX ADMIN — Medication 81 MILLIGRAM(S): at 09:32

## 2024-08-30 NOTE — PROGRESS NOTE ADULT - SUBJECTIVE AND OBJECTIVE BOX
HPI: 55 yo M with PMhx of NICMP, chronic HFrEF (LVEF 30-35%), s/p dual chamber ICD ([employer])  HTN, DM, obesity, TIA, PAF on xarelto, h/o DCCV,  TRIP non compliant with CPAP, Pt has been c/o MOMIN, ICD ahowed remains in AFIb since Feb. Admitted for tikosyn loading followed by DCCV on 3rd day.  Pt is OOB, denies chest pain/acute SOB/palpitations at rest  Has been compliant with meds including xarelto.  Pt has been lost 10 lbs recently    tele: AFib 60-70' s bpm with PVC  EKG: Afib 69bpm, V-paced, QRS 168ms, QT 480ms/QTC 514ms (corrected for paced: 446ms)  Echo ( 5/3/24) LVEF 30-35%, sev dilated LA  ST (March 2024) severe defect, no ischemia  Cardio-     8/27/24: pt seen OOB in chair in NAD, he denies CP, SOB, dizziness, palpitations.  Pt had additional potassium replacement overnight and has not yet started Tikosyn.  Repeat K 4.4 just now and he he will get first dose stat now.  TELE: atrial fibrillation with V-pacing 60-70 bpm, run of NSVT 7 beats  EKG 8/27/24 prior to Tikosyn:  AF 67 bpm, QRS 162ms, QT/QTc 498/526ms (corrected for paced QRS = 464ms)      MEDICATIONS  (STANDING):  amLODIPine   Tablet 5 milliGRAM(s) Oral daily  aspirin enteric coated 81 milliGRAM(s) Oral daily  atorvastatin 40 milliGRAM(s) Oral at bedtime  dextrose 5%. 1000 milliLiter(s) (50 mL/Hr) IV Continuous <Continuous>  dextrose 5%. 1000 milliLiter(s) (100 mL/Hr) IV Continuous <Continuous>  dextrose 50% Injectable 25 Gram(s) IV Push once  dextrose 50% Injectable 25 Gram(s) IV Push once  dextrose 50% Injectable 12.5 Gram(s) IV Push once  dofetilide. 250 MICROGram(s) Oral every 12 hours  furosemide    Tablet 40 milliGRAM(s) Oral daily  glucagon  Injectable 1 milliGRAM(s) IntraMuscular once  insulin lispro (ADMELOG) corrective regimen sliding scale   SubCutaneous three times a day before meals  insulin lispro (ADMELOG) corrective regimen sliding scale.   SubCutaneous at bedtime  magnesium oxide 400 milliGRAM(s) Oral at bedtime  metoprolol tartrate 50 milliGRAM(s) Oral two times a day  rivaroxaban 20 milliGRAM(s) Oral with dinner  sacubitril 49 mG/valsartan 51 mG 1 Tablet(s) Oral two times a day  spironolactone 25 milliGRAM(s) Oral daily    MEDICATIONS  (PRN):  acetaminophen     Tablet .. 650 milliGRAM(s) Oral every 6 hours PRN Temp greater or equal to 38C (100.4F), Mild Pain (1 - 3)  aluminum hydroxide/magnesium hydroxide/simethicone Suspension 30 milliLiter(s) Oral every 4 hours PRN Dyspepsia  dextrose Oral Gel 15 Gram(s) Oral once PRN Blood Glucose LESS THAN 70 milliGRAM(s)/deciliter  melatonin 3 milliGRAM(s) Oral at bedtime PRN Insomnia  Allergies  No Known Allergies               Vital Signs Last 24 Hrs  T(C): 36.7 (27 Aug 2024 08:45), Max: 37 (26 Aug 2024 13:33)  T(F): 98.1 (27 Aug 2024 08:45), Max: 98.6 (26 Aug 2024 13:33)  HR: 62 (27 Aug 2024 08:45) (62 - 65)  BP: 100/71 (27 Aug 2024 08:45) (95/58 - 110/76)  BP(mean): 81 (27 Aug 2024 08:45) (81 - 85)  RR: 18 (27 Aug 2024 08:45) (17 - 18)  SpO2: 98% (27 Aug 2024 08:45) (96% - 98%)    Parameters below as of 27 Aug 2024 08:45  Patient On (Oxygen Delivery Method): room air                 12.5   7.83  )-----------( 294      ( 26 Aug 2024 13:55 )             39.2     08-27    x   |  x   |  x   ----------------------------<  x   4.4   |  x   |  x     Ca    8.6      27 Aug 2024 04:34  Mg     2.1     08-27      PHYSICAL EXAM:    General: WN/WD NAD  Neurology: A&Ox3, nonfocal, ROSS x 4  HEENT: NC/AT, neck supple, no JVD, EOMI, PERRL  Respiratory: CTA B/L  CV: Irregular S1S2, no murmurs, rubs or gallops  Abdominal: Soft, NT, ND +BS  Extremities: No edema, + peripheral pulses  Skin: No rashes      LAST 2D echo as outpatient in 5/2024, LVEF 30-35%      
HPI: 55 yo M with PMhx of NICMP, chronic HFrEF (LVEF 30-35%), s/p dual chamber ICD (Medtronic)  HTN, DM, obesity, TIA, PAF on xarelto, h/o DCCV,  TRIP non compliant with CPAP, Pt has been c/o MOMIN, ICD showed remains in AFIb since Feb. Admitted for tikosyn loading followed by DCCV on 3rd day.  Pt is OOB, denies chest pain/acute SOB/palpitations at rest  Has been compliant with meds including xarelto.  Pt has been lost 10 lbs recently    tele: AFib 60-70' s bpm with PVC  EKG: Afib 69bpm, V-paced, QRS 168ms, QT 480ms/QTC 514ms (corrected for paced: 446ms)  Echo ( 5/3/24) LVEF 30-35%, sev dilated LA  ST (2024) severe defect, no ischemia  Cardio- DrRianna    24: pt seen OOB in chair in NAD, he denies CP, SOB, dizziness, palpitations.  Pt had additional potassium replacement overnight and has not yet started Tikosyn.  Repeat K 4.4 just now and he he will get first dose stat now.  TELE: atrial fibrillation with V-pacing 60-70 bpm, run of NSVT 7 beats  EKG 24 prior to Tikosyn:  AF 67 bpm, QRS 162ms, QT/QTc 498/526ms (corrected for paced QRS = 464ms)    24: pt seen sitting OOB in chair in NAD.  Overnight he converted to SR, now AV paced.  He reports brief episode SOB overnight, has no symptoms now.  s/p 2/6 doses Tikosyn 250mcg.  TELE: AV paced 70-80 bpm, occasional NSVTs  EKG: Afib 64 bpm, QRS 162ms, QTc corrected for pacing 464ms      24:  OOB in chair in NAD.  He remains in SR, and denies c/o.  s/p5/6 doses of Tikosyn 250mcg.  TELE: AV paced 60-70 bpm, brief NSVTs  EK24 @ 1200:  AV paced 76bpm, HI 286ms, QRS 164ms, QTc 544ms (corrected 480ms)    MEDICATIONS  (STANDING):  amLODIPine   Tablet 5 milliGRAM(s) Oral daily  aspirin enteric coated 81 milliGRAM(s) Oral daily  atorvastatin 40 milliGRAM(s) Oral at bedtime  dofetilide. 250 MICROGram(s) Oral every 12 hours  furosemide    Tablet 40 milliGRAM(s) Oral daily  magnesium oxide 400 milliGRAM(s) Oral at bedtime  metoprolol tartrate 50 milliGRAM(s) Oral two times a day  rivaroxaban 20 milliGRAM(s) Oral with dinner  sacubitril 49 mG/valsartan 51 mG 1 Tablet(s) Oral two times a day  spironolactone 25 milliGRAM(s) Oral daily    MEDICATIONS  (PRN):  acetaminophen     Tablet .. 650 milliGRAM(s) Oral every 6 hours PRN Temp greater or equal to 38C (100.4F), Mild Pain (1 - 3)  aluminum hydroxide/magnesium hydroxide/simethicone Suspension 30 milliLiter(s)  Oral every 4 hours PRN Dyspepsia  melatonin 3 milliGRAM(s) Oral at bedtime PRN Insomnia    Allergies    No Known Allergies    Intolerances      REVIEW OF SYSTEMS:    CONSTITUTIONAL: No weakness, fevers or chills  EYES/ENT: No visual changes;  No vertigo or throat pain   NECK: No pain or stiffness  RESPIRATORY: No cough, wheezing, hemoptysis; No shortness of breath  CARDIOVASCULAR: No chest pain or palpitations  GASTROINTESTINAL: No abdominal or epigastric pain. No nausea, vomiting, or hematemesis; No diarrhea or constipation. No melena or hematochezia.  GENITOURINARY: No dysuria, frequency or hematuria  NEUROLOGICAL: No numbness or weakness  SKIN: No itching, burning, rashes, or lesions   All other review of systems is negative unless indicated above    Vital Signs Last 24 Hrs  T(C): 36.8 (29 Aug 2024 07:41), Max: 36.8 (29 Aug 2024 07:41)  T(F): 98.2 (29 Aug 2024 07:41), Max: 98.2 (29 Aug 2024 07:41)  HR: 62 (29 Aug 2024 07:41) (62 - 66)  BP: 108/75 (29 Aug 2024 07:41) (100/65 - 108/75)  BP(mean): --  RR: 17 (29 Aug 2024 07:41) (17 - 17)  SpO2: 97% (29 Aug 2024 07:41) (97% - 100%)    Parameters below as of 29 Aug 2024 07:41  Patient On (Oxygen Delivery Method): room air        139  |  110<H>  |  20  ----------------------------<  101<H>  4.4   |  25  |  1.16    Ca    8.7      29 Aug 2024 07:10  Phos  3.4       Mg     2.3           PHYSICAL EXAM:    General: WN/WD NAD  Neurology: A&Ox3, nonfocal, ROSS x 4  HEENT: NC/AT, neck supple, no JVD, EOMI, PERRL  Respiratory: CTA B/L  CV: RRR, S1S2, no murmurs, rubs or gallops  Abdominal: Soft, NT, ND +BS  Extremities: No edema, + peripheral pulses  Skin: No rashes      LAST 2D echo as outpatient in 2024, LVEF 30-35%    
53 yo M with pmh  aicd, asd, HFrEF, htn, pafib, linus sent in by Dr Atkins for Tikosyn loading.   Pt planned to 3 day load followed by [facility]   PT otherwise denies any complaints at this time  Labs pending. Pt direct admission to   Pt is OOB, denies chest pain/acute SOB/palpitations at restPt has been lost 10 lbs recently  tele: sinus rhythm  with PVC  EKG: sinus rhthm 69bpm, V-paced, (corrected for paced: 480  REVIEW OF SYSTEMS:    CONSTITUTIONAL: No weakness, fevers or chills  EYES/ENT: No visual changes;  No vertigo or throat pain   NECK: No pain or stiffness  RESPIRATORY: No cough, wheezing, hemoptysis; No shortness of breath  CARDIOVASCULAR: No chest pain or palpitations  GASTROINTESTINAL: No abdominal or epigastric pain. No nausea, vomiting, or hematemesis; No diarrhea or constipation. No melena or hematochezia.  GENITOURINARY: No dysuria, frequency or hematuria  NEUROLOGICAL: No numbness or weakness  SKIN: No itching, burning, rashes, or lesions   All other review of systems is negative unless indicated above    PHYSICAL EXAM:    Daily     Daily Weight in k (29 Aug 2024 06:08)    ICU Vital Signs Last 24 Hrs  T(C): 36.8 (29 Aug 2024 07:41), Max: 36.8 (29 Aug 2024 07:41)  T(F): 98.2 (29 Aug 2024 07:41), Max: 98.2 (29 Aug 2024 07:41)  HR: 62 (29 Aug 2024 07:41) (62 - 62)  BP: 108/75 (29 Aug 2024 07:41) (108/75 - 108/75)  BP(mean): --  ABP: --  ABP(mean): --  RR: 17 (29 Aug 2024 07:41) (17 - 17)  SpO2: 97% (29 Aug 2024 07:41) (97% - 97%)    O2 Parameters below as of 29 Aug 2024 07:41  Patient On (Oxygen Delivery Method): room air    PHYSICAL EXAM:  Constitutional: NAD, awake and alert  HEENT: PERR, EOMI, Normal Hearing, MMM  Neck: Soft and supple, No LAD, No JVD  Respiratory: Breath sounds are clear bilaterally, No wheezing, rales or rhonchi  Cardiovascular: S1 and S2, regular rate and rhythm, no Murmurs, gallops or rubs  Gastrointestinal: Bowel Sounds present, soft, nontender, nondistended, no guarding, no rebound  Extremities: No peripheral edema  Vascular: 2+ peripheral pulses  Neurological: A/O x 3, no focal deficits  Musculoskeletal: 5/5 strength b/l upper and lower extremities  Skin: No rashes                        139  |  110<H>  |  20  ----------------------------<  101<H>  4.4   |  25  |  1.16    Ca    8.7      29 Aug 2024 07:10  Phos  3.4       Mg     2.3                           Urinalysis Basic - ( 29 Aug 2024 07:10 )    Color: x / Appearance: x / SG: x / pH: x  Gluc: 101 mg/dL / Ketone: x  / Bili: x / Urobili: x   Blood: x / Protein: x / Nitrite: x   Leuk Esterase: x / RBC: x / WBC x   Sq Epi: x / Non Sq Epi: x / Bacteria: x            MEDICATIONS  (STANDING):  amLODIPine   Tablet 5 milliGRAM(s) Oral daily  aspirin enteric coated 81 milliGRAM(s) Oral daily  atorvastatin 40 milliGRAM(s) Oral at bedtime  dofetilide. 250 MICROGram(s) Oral every 12 hours  furosemide    Tablet 40 milliGRAM(s) Oral daily  magnesium oxide 400 milliGRAM(s) Oral at bedtime  metoprolol tartrate 50 milliGRAM(s) Oral two times a day  rivaroxaban 20 milliGRAM(s) Oral with dinner  sacubitril 49 mG/valsartan 51 mG 1 Tablet(s) Oral two times a day  spironolactone 25 milliGRAM(s) Oral daily      
55 yo M with pmh  aicd, asd, HFrEF, htn, pafib, linus sent in by Dr Atkins for Tikosyn loading.   [facility]   PT otherwise denies any complaints at this time  Labs pending. Pt direct admission to   Pt is OOB, denies chest pain/acute SOB/palpitations at restPt has been lost 10 lbs recentlytele: AFib 60-70' s bpm with PVC  EKG: Afib 69bpm, V-paced, QRS 168ms, QT 480ms/QTC 514ms (corrected for paced: 446ms)  REVIEW OF SYSTEMS:    CONSTITUTIONAL: No weakness, fevers or chills  EYES/ENT: No visual changes;  No vertigo or throat pain   NECK: No pain or stiffness  RESPIRATORY: No cough, wheezing, hemoptysis; No shortness of breath  CARDIOVASCULAR: No chest pain or palpitations  GASTROINTESTINAL: No abdominal or epigastric pain. No nausea, vomiting, or hematemesis; No diarrhea or constipation. No melena or hematochezia.  GENITOURINARY: No dysuria, frequency or hematuria  NEUROLOGICAL: No numbness or weakness  SKIN: No itching, burning, rashes, or lesions   All other review of systems is negative unless indicated above      PHYSICAL EXAM:  Constitutional: NAD, awake and alert  HEENT: PERR, EOMI, Normal Hearing, MMM  Neck: Soft and supple, No LAD, No JVD  Respiratory: Breath sounds are clear bilaterally, No wheezing, rales or rhonchi  Cardiovascular: S1 and S2, regular rate and rhythm, no Murmurs, gallops or rubs  Gastrointestinal: Bowel Sounds present, soft, nontender, nondistended, no guarding, no rebound  Extremities: No peripheral edema  Vascular: 2+ peripheral pulses  Neurological: A/O x 3, no focal deficits  Musculoskeletal: 5/5 strength b/l upper and lower extremities  Skin: No rashes      PHYSICAL EXAM:    Daily     Daily Weight in k.8 (28 Aug 2024 06:56)    ICU Vital Signs Last 24 Hrs  T(C): 36.9 (28 Aug 2024 07:27), Max: 36.9 (28 Aug 2024 07:27)  T(F): 98.4 (28 Aug 2024 07:27), Max: 98.4 (28 Aug 2024 07:27)  HR: 80 (28 Aug 2024 07:27) (62 - 80)  BP: 114/92 (28 Aug 2024 07:27) (96/72 - 114/92)  BP(mean): --  ABP: --  ABP(mean): --  RR: 17 (28 Aug 2024 07:27) (17 - 17)  SpO2: 100% (28 Aug 2024 07:27) (95% - 100%)    O2 Parameters below as of 28 Aug 2024 07:27  Patient On (Oxygen Delivery Method): room air                            140  |  111<H>  |  19  ----------------------------<  112<H>  4.5   |  24  |  1.21    Ca    9.1      28 Aug 2024 08:01  Mg     2.3                           Urinalysis Basic - ( 28 Aug 2024 08:01 )    Color: x / Appearance: x / SG: x / pH: x  Gluc: 112 mg/dL / Ketone: x  / Bili: x / Urobili: x   Blood: x / Protein: x / Nitrite: x   Leuk Esterase: x / RBC: x / WBC x   Sq Epi: x / Non Sq Epi: x / Bacteria: x            MEDICATIONS  (STANDING):  amLODIPine   Tablet 5 milliGRAM(s) Oral daily  aspirin enteric coated 81 milliGRAM(s) Oral daily  atorvastatin 40 milliGRAM(s) Oral at bedtime  dofetilide. 250 MICROGram(s) Oral every 12 hours  furosemide    Tablet 40 milliGRAM(s) Oral daily  magnesium oxide 400 milliGRAM(s) Oral at bedtime  metoprolol tartrate 50 milliGRAM(s) Oral two times a day  rivaroxaban 20 milliGRAM(s) Oral with dinner  sacubitril 49 mG/valsartan 51 mG 1 Tablet(s) Oral two times a day  spironolactone 25 milliGRAM(s) Oral daily      
53 yo M with pmh  aicd, asd, HFrEF, htn, pafib, linus sent in by Dr Atkins for Tikosyn loading.   Pt planned to 3 day load followed by [facility] 8/29  PT otherwise denies any complaints at this time  Labs pending. Pt direct admission to   8/27Pt is OOB, denies chest pain/acute SOB/palpitations at restPt has been lost 10 lbs recentlytele: AFib 60-70' s bpm with PVC  EKG: Afib 69bpm, V-paced, QRS 168ms, QT 480ms/QTC 514ms (corrected for paced: 446ms)  REVIEW OF SYSTEMS:    CONSTITUTIONAL: No weakness, fevers or chills  EYES/ENT: No visual changes;  No vertigo or throat pain   NECK: No pain or stiffness  RESPIRATORY: No cough, wheezing, hemoptysis; No shortness of breath  CARDIOVASCULAR: No chest pain or palpitations  GASTROINTESTINAL: No abdominal or epigastric pain. No nausea, vomiting, or hematemesis; No diarrhea or constipation. No melena or hematochezia.  GENITOURINARY: No dysuria, frequency or hematuria  NEUROLOGICAL: No numbness or weakness  SKIN: No itching, burning, rashes, or lesions   All other review of systems is negative unless indicated above      PHYSICAL EXAM:    Constitutional: NAD, awake and alert  HEENT: PERR, EOMI, Normal Hearing, MMM  Neck: Soft and supple, No LAD, No JVD  Respiratory: Breath sounds are clear bilaterally, No wheezing, rales or rhonchi  Cardiovascular: S1 and S2, regular rate and rhythm, no Murmurs, gallops or rubs  Gastrointestinal: Bowel Sounds present, soft, nontender, nondistended, no guarding, no rebound  Extremities: No peripheral edema  Vascular: 2+ peripheral pulses  Neurological: A/O x 3, no focal deficits  Musculoskeletal: 5/5 strength b/l upper and lower extremities  Skin: No rashes      PHYSICAL EXAM:    Daily     Daily     ICU Vital Signs Last 24 Hrs  T(C): 36.7 (26 Aug 2024 21:08), Max: 37 (26 Aug 2024 13:33)  T(F): 98.1 (26 Aug 2024 21:08), Max: 98.6 (26 Aug 2024 13:33)  HR: 62 (26 Aug 2024 21:08) (62 - 65)  BP: 95/58 (26 Aug 2024 21:08) (95/58 - 110/76)  BP(mean): 85 (26 Aug 2024 13:33) (85 - 85)  ABP: --  ABP(mean): --  RR: 17 (26 Aug 2024 21:08) (17 - 18)  SpO2: 96% (26 Aug 2024 21:08) (96% - 96%)    O2 Parameters below as of 26 Aug 2024 21:08  Patient On (Oxygen Delivery Method): room air                                  12.5   7.83  )-----------( 294      ( 26 Aug 2024 13:55 )             39.2       CBC Full  -  ( 26 Aug 2024 13:55 )  WBC Count : 7.83 K/uL  RBC Count : 4.47 M/uL  Hemoglobin : 12.5 g/dL  Hematocrit : 39.2 %  Platelet Count - Automated : 294 K/uL  Mean Cell Volume : 87.7 fl  Mean Cell Hemoglobin : 28.0 pg  Mean Cell Hemoglobin Concentration : 31.9 gm/dL  Auto Neutrophil # : x  Auto Lymphocyte # : x  Auto Monocyte # : x  Auto Eosinophil # : x  Auto Basophil # : x  Auto Neutrophil % : x  Auto Lymphocyte % : x  Auto Monocyte % : x  Auto Eosinophil % : x  Auto Basophil % : x      08-27    142  |  110<H>  |  19  ----------------------------<  98  3.6   |  26  |  1.19    Ca    8.6      27 Aug 2024 04:34  Mg     2.1     08-27                      Urinalysis Basic - ( 27 Aug 2024 04:34 )    Color: x / Appearance: x / SG: x / pH: x  Gluc: 98 mg/dL / Ketone: x  / Bili: x / Urobili: x   Blood: x / Protein: x / Nitrite: x   Leuk Esterase: x / RBC: x / WBC x   Sq Epi: x / Non Sq Epi: x / Bacteria: x            MEDICATIONS  (STANDING):  amLODIPine   Tablet 5 milliGRAM(s) Oral daily  aspirin enteric coated 81 milliGRAM(s) Oral daily  atorvastatin 40 milliGRAM(s) Oral at bedtime  dextrose 5%. 1000 milliLiter(s) (50 mL/Hr) IV Continuous <Continuous>  dextrose 5%. 1000 milliLiter(s) (100 mL/Hr) IV Continuous <Continuous>  dextrose 50% Injectable 25 Gram(s) IV Push once  dextrose 50% Injectable 12.5 Gram(s) IV Push once  dextrose 50% Injectable 25 Gram(s) IV Push once  dofetilide. 250 MICROGram(s) Oral every 12 hours  furosemide    Tablet 40 milliGRAM(s) Oral daily  glucagon  Injectable 1 milliGRAM(s) IntraMuscular once  insulin lispro (ADMELOG) corrective regimen sliding scale   SubCutaneous three times a day before meals  insulin lispro (ADMELOG) corrective regimen sliding scale.   SubCutaneous at bedtime  magnesium oxide 400 milliGRAM(s) Oral at bedtime  metoprolol tartrate 50 milliGRAM(s) Oral two times a day  potassium chloride  10 mEq/100 mL IVPB 10 milliEquivalent(s) IV Intermittent every 1 hour  rivaroxaban 20 milliGRAM(s) Oral with dinner  sacubitril 49 mG/valsartan 51 mG 1 Tablet(s) Oral two times a day  spironolactone 25 milliGRAM(s) Oral daily      
HPI: 55 yo M with PMhx of NICMP, chronic HFrEF (LVEF 30-35%), s/p dual chamber ICD ([employer])  HTN, DM, obesity, TIA, PAF on xarelto, h/o DCCV,  TRIP non compliant with CPAP, Pt has been c/o MOMIN, ICD showed remains in AFIb since Feb. Admitted for tikosyn loading followed by DCCV on 3rd day.  Pt is OOB, denies chest pain/acute SOB/palpitations at rest  Has been compliant with meds including xarelto.  Pt has been lost 10 lbs recently    tele: AFib 60-70' s bpm with PVC  EKG: Afib 69bpm, V-paced, QRS 168ms, QT 480ms/QTC 514ms (corrected for paced: 446ms)  Echo ( 5/3/24) LVEF 30-35%, sev dilated LA  ST (2024) severe defect, no ischemia  Cardio-     24: pt seen OOB in chair in NAD, he denies CP, SOB, dizziness, palpitations.  Pt had additional potassium replacement overnight and has not yet started Tikosyn.  Repeat K 4.4 just now and he he will get first dose stat now.  TELE: atrial fibrillation with V-pacing 60-70 bpm, run of NSVT 7 beats  EKG 24 prior to Tikosyn:  AF 67 bpm, QRS 162ms, QT/QTc 498/526ms (corrected for paced QRS = 464ms)    24: pt seen sitting OOB in chair in NAD.  Overnight he converted to SR, now AV paced.  He reports brief episode SOB overnight, has no symptoms now.  s/p 2/6 doses Tikosyn 250mcg.  TELE: AV paced 70-80 bpm, occasional NSVTs  EKG: Afib 64 bpm, QRS 162ms, QTc corrected for pacing 464ms      24:  OOB in chair in NAD.  He remains in SR, and denies c/o.  s/p5/6 doses of Tikosyn 250mcg.  TELE: AV paced 60-70 bpm, brief NSVTs  EK24 @ 1200:  AV paced 76bpm, SD 286ms, QRS 164ms, QTc 544ms (corrected 480ms)    24:  pt seen sitting OOB in chair, he denies any c/o palpitations, SOB, CP, dizziness.  He has completed inpatient Tikosyn load.  He remains in SR and did not have cardioversion.  TELE: AV paced 60-70 bpm  EKG: AV paced 98 bpm, SD 276ms, QRS 160ms, QTc 536ms, (corrected for paced QRS is 476ms)      MEDICATIONS  (STANDING):  amLODIPine   Tablet 5 milliGRAM(s) Oral daily  aspirin enteric coated 81 milliGRAM(s) Oral daily  atorvastatin 40 milliGRAM(s) Oral at bedtime  dofetilide. 250 MICROGram(s) Oral every 12 hours  furosemide    Tablet 40 milliGRAM(s) Oral daily  magnesium oxide 400 milliGRAM(s) Oral at bedtime  metoprolol tartrate 50 milliGRAM(s) Oral two times a day  rivaroxaban 20 milliGRAM(s) Oral with dinner  sacubitril 49 mG/valsartan 51 mG 1 Tablet(s) Oral two times a day  spironolactone 25 milliGRAM(s) Oral daily    MEDICATIONS  (PRN):  acetaminophen     Tablet .. 650 milliGRAM(s) Oral every 6 hours PRN Temp greater or equal to 38C (100.4F), Mild Pain (1 - 3)  aluminum hydroxide/magnesium hydroxide/simethicone Suspension 30 milliLiter(s) Oral every 4 hours PRN Dyspepsia  melatonin 3 milliGRAM(s) Oral at bedtime PRN Insomnia    Allergies    No Known Allergies        REVIEW OF SYSTEMS:    CONSTITUTIONAL: No weakness, fevers or chills  EYES/ENT: No visual changes;  No vertigo or throat pain   NECK: No pain or stiffness  RESPIRATORY: No cough, wheezing, hemoptysis; No shortness of breath  CARDIOVASCULAR: No chest pain or palpitations  GASTROINTESTINAL: No abdominal or epigastric pain. No nausea, vomiting, or hematemesis; No diarrhea or constipation. No melena or hematochezia.  GENITOURINARY: No dysuria, frequency or hematuria  NEUROLOGICAL: No numbness or weakness  SKIN: No itching, burning, rashes, or lesions   All other review of systems is negative unless indicated above      Vital Signs Last 24 Hrs  T(C): 36.7 (30 Aug 2024 08:03), Max: 36.9 (29 Aug 2024 20:45)  T(F): 98 (30 Aug 2024 08:03), Max: 98.4 (29 Aug 2024 20:45)  HR: 65 (30 Aug 2024 08:03) (65 - 65)  BP: 115/84 (30 Aug 2024 08:03) (105/71 - 115/84)  BP(mean): --  RR: 18 (30 Aug 2024 08:03) (18 - 18)  SpO2: 99% (30 Aug 2024 08:03) (98% - 99%)    Parameters below as of 30 Aug 2024 08:03  Patient On (Oxygen Delivery Method): room air          137  |  108  |  20  ----------------------------<  101<H>  4.3   |  24  |  1.12    Ca    9.2      30 Aug 2024 06:58  Phos  3.4     08-29  Mg     2.5     0830          PHYSICAL EXAM:    General: WN/WD NAD  Neurology: A&Ox3, nonfocal, ROSS x 4  HEENT: NC/AT, neck supple, no JVD, EOMI, PERRL  Respiratory: CTA B/L  CV: RRR, S1S2, no murmurs, rubs or gallops  Abdominal: Soft, NT, ND +BS  Extremities: No edema, + peripheral pulses  Skin: No rashes      LAST 2D echo as outpatient in 2024, LVEF 30-35%  
HPI: 55 yo M with PMhx of NICMP, chronic HFrEF (LVEF 30-35%), s/p dual chamber ICD ([employer]tronic)  HTN, DM, obesity, TIA, PAF on xarelto, h/o DCCV,  TRIP non compliant with CPAP, Pt has been c/o MOMIN, ICD showed remains in AFIb since Feb. Admitted for tikosyn loading followed by [facility] on 3rd day.  Pt is OOB, denies chest pain/acute SOB/palpitations at rest  Has been compliant with meds including xarelto.  Pt has been lost 10 lbs recently    tele: AFib 60-70' s bpm with PVC  EKG: Afib 69bpm, V-paced, QRS 168ms, QT 480ms/QTC 514ms (corrected for paced: 446ms)  Echo ( 5/3/24) LVEF 30-35%, sev dilated LA  ST (March 2024) severe defect, no ischemia  Cardio- DrRianna    8/27/24: pt seen OOB in chair in NAD, he denies CP, SOB, dizziness, palpitations.  Pt had additional potassium replacement overnight and has not yet started Tikosyn.  Repeat K 4.4 just now and he he will get first dose stat now.  TELE: atrial fibrillation with V-pacing 60-70 bpm, run of NSVT 7 beats  EKG 8/27/24 prior to Tikosyn:  AF 67 bpm, QRS 162ms, QT/QTc 498/526ms (corrected for paced QRS = 464ms)    8/28/24: pt seen sitting OOB in chair in NAD.  Overnight he converted to SR, now AV paced.  He reports brief episode SOB overnight, has no symptoms now.  s/p 2/6 doses Tikosyn 250mcg.  TELE: AV paced 70-80 bpm, occasional NSVTs  EKG: Afib 64 bpm, QRS 162ms, QTc corrected for pacing 464ms    MEDICATIONS  (STANDING):  amLODIPine   Tablet 5 milliGRAM(s) Oral daily  aspirin enteric coated 81 milliGRAM(s) Oral daily  atorvastatin 40 milliGRAM(s) Oral at bedtime  dextrose 5%. 1000 milliLiter(s) (100 mL/Hr) IV Continuous <Continuous>  dextrose 5%. 1000 milliLiter(s) (50 mL/Hr) IV Continuous <Continuous>  dextrose 50% Injectable 25 Gram(s) IV Push once  dextrose 50% Injectable 12.5 Gram(s) IV Push once  dextrose 50% Injectable 25 Gram(s) IV Push once  dofetilide. 250 MICROGram(s) Oral every 12 hours  furosemide    Tablet 40 milliGRAM(s) Oral daily  glucagon  Injectable 1 milliGRAM(s) IntraMuscular once  insulin lispro (ADMELOG) corrective regimen sliding scale   SubCutaneous three times a day before meals  insulin lispro (ADMELOG) corrective regimen sliding scale.   SubCutaneous at bedtime  magnesium oxide 400 milliGRAM(s) Oral at bedtime  metoprolol tartrate 50 milliGRAM(s) Oral two times a day  rivaroxaban 20 milliGRAM(s) Oral with dinner  sacubitril 49 mG/valsartan 51 mG 1 Tablet(s) Oral two times a day  spironolactone 25 milliGRAM(s) Oral daily    MEDICATIONS  (PRN):  acetaminophen     Tablet .. 650 milliGRAM(s) Oral every 6 hours PRN Temp greater or equal to 38C (100.4F), Mild Pain (1 - 3)  aluminum hydroxide/magnesium hydroxide/simethicone Suspension 30 milliLiter(s) Oral every 4 hours PRN Dyspepsia  dextrose Oral Gel 15 Gram(s) Oral once PRN Blood Glucose LESS THAN 70 milliGRAM(s)/deciliter  melatonin 3 milliGRAM(s) Oral at bedtime PRN Insomnia    Allergies    No Known Allergies      REVIEW OF SYSTEMS:    CONSTITUTIONAL: No weakness, fevers or chills  EYES/ENT: No visual changes;  No vertigo or throat pain   NECK: No pain or stiffness  RESPIRATORY: Brief SOB overnight, resolved.  CARDIOVASCULAR: No chest pain or palpitations  GASTROINTESTINAL: No abdominal or epigastric pain. No nausea, vomiting, or hematemesis; No diarrhea or constipation. No melena or hematochezia.  GENITOURINARY: No dysuria, frequency or hematuria  NEUROLOGICAL: No numbness or weakness  SKIN: No itching, burning, rashes, or lesions   All other review of systems is negative unless indicated above      Vital Signs Last 24 Hrs  T(C): 36.9 (28 Aug 2024 07:27), Max: 36.9 (28 Aug 2024 07:27)  T(F): 98.4 (28 Aug 2024 07:27), Max: 98.4 (28 Aug 2024 07:27)  HR: 80 (28 Aug 2024 07:27) (62 - 80)  BP: 114/92 (28 Aug 2024 07:27) (94/51 - 114/92)  BP(mean): --  RR: 17 (28 Aug 2024 07:27) (17 - 17)  SpO2: 100% (28 Aug 2024 07:27) (95% - 100%)    Parameters below as of 28 Aug 2024 07:27  Patient On (Oxygen Delivery Method): room air                          12.5   7.83  )-----------( 294      ( 26 Aug 2024 13:55 )             39.2   08-28    140  |  111<H>  |  19  ----------------------------<  112<H>  4.5   |  24  |  1.21    Ca    9.1      28 Aug 2024 08:01  Mg     2.3     08-28      PHYSICAL EXAM:    General: WN/WD NAD  Neurology: A&Ox3, nonfocal, ROSS x 4  HEENT: NC/AT, neck supple, no JVD, EOMI, PERRL  Respiratory: CTA B/L  CV: RRR, S1S2, no murmurs, rubs or gallops  Abdominal: Soft, NT, ND +BS  Extremities: No edema, + peripheral pulses  Skin: No rashes      LAST 2D echo as outpatient in 5/2024, LVEF 30-35%

## 2024-08-30 NOTE — DISCHARGE NOTE NURSING/CASE MANAGEMENT/SOCIAL WORK - PATIENT PORTAL LINK FT
You can access the FollowMyHealth Patient Portal offered by [facility] by registering at the following website: [email or web address]. By joining [employer]’s FollowMyHealth portal, you will also be able to view your health information using other applications (apps) compatible with our system.

## 2024-08-30 NOTE — CHART NOTE - NSCHARTNOTEFT_GEN_A_CORE
I Was called by the nurse because EKG showed QTc 544.  Patient is on Tikosyn.  Communicated with EP Dr. Connors and advised that no action is needed to be done. will repeat EKG am.

## 2024-08-30 NOTE — DISCHARGE NOTE PROVIDER - PROVIDER TOKENS
PROVIDER:[TOKEN:[[id number]],FOLLOWUP:[1 week]],PROVIDER:[TOKEN:[[id number]],FOLLOWUP:[1 week]],PROVIDER:[TOKEN:[[id number]],FOLLOWUP:[1 week]]

## 2024-08-30 NOTE — DISCHARGE NOTE PROVIDER - NSDCFUSCHEDAPPT_GEN_ALL_CORE_FT
[facility] [address]  Scheduled Appointment: 09/05/2024    [facility] [address]  Scheduled Appointment: 10/31/2024

## 2024-08-30 NOTE — DISCHARGE NOTE PROVIDER - NSDCCPCAREPLAN_GEN_ALL_CORE_FT
PRINCIPAL DISCHARGE DIAGNOSIS  Diagnosis: Paroxysmal atrial fibrillation  Assessment and Plan of Treatment: WHAT IS ATRIAL FIBRILLATION? Atrial fibrillation (AFIB) is a cardiac arrhythmia caused by a disorder in the hearts electrical system. An arrhythmia is a problem with the speed or rhythm of the heartbeat. Atrial fibrillation is the most common type of arrhythmia.  THINGS TO DO: (1) Monitor your blood pressure and heart rate (2) Limit or avoid alcohol intake – alcohol can increase your risk of AFIB (3) Do not smoke – nicotine can damage your heart and make it difficult to manage your AFIB (4) Eat heart healthy foods like fruits, vegetables, and whole grains (5) Manage a healthy weight (5) Get regular physical activity  MONITOR THESE SIGNS AND SYMPTOMS: (1) Shortness of breath (2) Nausea or vomiting (3) Chest pain or pressure (4) Chest palpitations. If you experience any of these, DO alert your primary care provider, or return to the Emergency Department if you feel very sick.

## 2024-08-30 NOTE — DISCHARGE NOTE NURSING/CASE MANAGEMENT/SOCIAL WORK - NSDCPEFALRISK_GEN_ALL_CORE
For information on Fall & Injury Prevention, visit: [email or web address] OR  [email or web address] OR  https://www.cdc.gov/steadi/patient.html

## 2024-08-30 NOTE — DISCHARGE NOTE PROVIDER - CARE PROVIDER_API CALL
Ladan Toth  Cardiology  [address] [facility] [address] [place] [address]  Phone: [phone]  Fax: [phone]  Follow Up Time: 1 week    Meng Connors  Cardiac Electrophysiology  [address] [place] [address]  Phone: [phone]  Fax: [phone]  Follow Up Time: 1 week    Adrian Macias  Family Medicine  [address] [place] [address]  Phone: [phone]  Fax: [phone]  Follow Up Time: 1 week

## 2024-08-30 NOTE — DISCHARGE NOTE PROVIDER - HOSPITAL COURSE
55 yo M with pmh  aicd, asd, HFrEF, htn, pafib, linus sent in by EP for Tikosyn loading. Pt planned to 3 day load followed by DCCV 8/29  PT otherwise denies any complaints at this time. He converted to SR with Tikosyn and did not need to undergo cardioversion. Continue Tikosyn 250mcg po q12hrs now.  Avoid other QT prolonging agents. Continue metoprolol 50mg po BIDContinue uninterrupted xarelto Continue Mg supplementation. GDMT for chronic HFrEF  Pt states not using CPAP think he sleeps good, advised to repeat sleep study as outpt, pt verbalized understandings. He has appointment in EP clinic for EKG in 1 week, 9/5/24 at 3 pm.          55 yo M with above PMHx presented for tikosyn loading followed by DCCV on 3rd day for PAF.  He converted to SR with Tikosyn and did need to undergo cardioversion.    A/P: Persistent Atrial fibrillation, NICM.  s/p completion of Tikosyn loading 250 mcg PO BID   After 2-3 doses he converted to SR, now AV pacing.  Continue Tikosyn 250mcg po q12hrs now.  (copay $47).     Avoid other QT prolonging agents.   Continue metoprolol 50mg po BID  Continue uninterrupted xarelto   Continue Mg supplementation.  GDMT for chronic HFrEF  Pt states not using CPAP think he sleeps good, advised to repeat sleep study as outpt, pt verbalized understandings.  Stable for discharge home today.  He has appointment in EP clinic for EKG in 1 week, 9/5/24 at 3 pm.  Plan d/w pt, Dr. Connors, [facility] team.   55 yo M with pmh  aicd, asd, HFrEF, htn, pafib, linus sent in by EP for Tikosyn loading. Pt planned to 3 day load followed by YEEV 8/29  PT otherwise denies any complaints at this time. He converted to SR with Tikosyn and did not need to undergo cardioversion. Continue Tikosyn 250mcg po q12hrs now.  Avoid other QT prolonging agents. Continue metoprolol 50mg po BID. Continue uninterrupted xarelto Continue Mg supplementation. GDMT for chronic HFrEF  Pt states not using CPAP think he sleeps good, advised to repeat sleep study as outpt, pt verbalized understandings. He has appointment in EP clinic for EKG in 1 week, 9/5/24 at 3 pm. Patient to follow up with PCP in 1-2 weeks, advised to return to ED with any worsening symptoms chest pain shortness of breath fevers chills nausea vomiting diarrhea.

## 2024-08-30 NOTE — PROGRESS NOTE ADULT - PROVIDER SPECIALTY LIST ADULT
Electrophysiology
Hospitalist
Electrophysiology

## 2024-08-30 NOTE — PROGRESS NOTE ADULT - ASSESSMENT
55 yo M with pmh  aicd, asd, HFrEF, htn, pafib, linus sent in by Dr Atkins for Tikosyn loading.   Pt planned to 3 day load followed by DCCV 8/29  PT otherwise denies any complaints at this time  Labs pending. Pt direct admission to         #Chronic Atrial fibrillation  #Non ischemic cardiomyopathy  #HF  - admit to tele  - Tikosyn loading  - EKG for QTC  - Monitor K/MAg  - continue BB/DOAC  - EP to monitor EKG for Tikosyn dosing  - NPO a MN wednesday for DCCV thursday 8/29        #HTN/NIDDM/LINUS/HLD  - resume home meds        dvt px: DOAC    
53 yo M with above PMHx presented for tikosyn loading followed by DCCV on 3rd day for PAF.    A/P: Persistent Atrial fibrillation, NICM.  s/p 2/6 doses Tikosyn 250mcg PO BID.  Continue tele monitoring- converted to SR overnight, now AV pacing.  Continue Tikosyn 250mcg po q12hrs now.  (copay $47).   Pt needs 6 doses in-pt loading.  12 lead EKG 2 hrs post each dose for QTC   call EP team if QTC>500ms or >50ms from baseline. [phone]  Avoid other QT prolonging agents.   Daily BMP/Mg.  Keep K+>4, Mg>2.  Continue metoprolol 50mg po BID  Continue uninterrupted xarelto   GDMT for chronic HFrEF  Keep NPO post MN on 8/29 for DCCV on 8/30  Pt states not using CPAP think he sleeps good, advised to repeat sleep study as outpt, pt verbalized understandings.  Plan d/w pt/hospitalist/RN
55 yo M with above PMHx presented for tikosyn loading followed by DCCV on 3rd day for PAF.    A/P: Persistent Atrial fibrillation, NICM  Continue tele monitoring  Start Tikosyn 250mcg po q12hrs now.  (copay $47).   Pt needs 6 doses in-pt loading.  12 lead EKG in 2 hrs post each dose for QTC   call EP team if QTC>500ms or >50ms from baseline. [phone]  Avoid other QT prolonging agents.   Daily BMP/Mg.  Keep K+>4, Mg>2.  Continue metoprolol 50mg po BID  Continue uninterrupted xarelto   GDMT for chronic HFrEF  Keep NPO post MN on 8/29 for DCCV on 8/30  Pt states not using CPAP think he sleeps good, advised to repeat sleep study as outpt, pt verbalized understandings.  Plan d/w pt/hospitalist/RN
55 yo M with above PMHx presented for tikosyn loading followed by DCCV on 3rd day for PAF.    A/P: Persistent Atrial fibrillation, NICM.  s/p 5/6 doses Tikosyn 250mcg PO BID.  Continue tele monitoring- converted to SR, now AV pacing.  Continue Tikosyn 250mcg po q12hrs now.  (copay $47).   Pt needs 6 doses in-pt loading.  12 lead EKG 2 hrs post each dose for QTC   call EP team if QTC>500ms or >50ms from baseline. [phone]  Avoid other QT prolonging agents.   Daily BMP/Mg.  Keep K+>4, Mg>2.  Continue metoprolol 50mg po BID  Continue uninterrupted xarelto   GDMT for chronic HFrEF  Pt states not using CPAP think he sleeps good, advised to repeat sleep study as outpt, pt verbalized understandings.  Plan for discharge tomorrow morning.  Plan d/w pt, Dr. Connors  
55 yo M with above PMHx presented for tikosyn loading followed by DCCV on 3rd day for PAF.  He converted to SR with Tikosyn and did need to undergo cardioversion.    A/P: Persistent Atrial fibrillation, NICM.  s/p completion of Tikosyn loading 250 mcg PO BID   After 2-3 doses he converted to SR, now AV pacing.  Continue Tikosyn 250mcg po q12hrs now.  (copay $47).     Avoid other QT prolonging agents.   Continue metoprolol 50mg po BID  Continue uninterrupted xarelto   Continue Mg supplementation.  GDMT for chronic HFrEF  Pt states not using CPAP think he sleeps good, advised to repeat sleep study as outpt, pt verbalized understandings.  Stable for discharge home today.  He has appointment in EP clinic for EKG in 1 week, 9/5/24 at 3 pm.  Plan d/w pt, Dr. Connors, [facility] team.
53 yo M with pmh  aicd, asd, HFrEF, htn, pafib, linus sent in by Dr Atkins for Tikosyn loading.   Pt planned to 3 day load followed by DCCV 8/29  PT otherwise denies any complaints at this time  Labs pending. Pt direct admission to 3E        #Chronic Atrial fibrillation c converted to sinus  overnight   #Non ischemic cardiomyopathy  #HF  - admit to tele  -  on Tikosyn loading  - EKG for QTC  - Monitor K/MAg  - continue BB/DOAC  - EP to monitor EKG for Tikosyn dosing  -       #HTN/NIDDM/LINUS/HLD  - resume home meds        dvt px: DOAC    
53 yo M with pmh  aicd, asd, HFrEF, htn, pafib, linus sent in by Dr Atkins for Tikosyn loading.   Pt planned to 3 day load followed by DCCV 8/29  PT otherwise denies any complaints at this time  Labs pending. Pt direct admission to 3E        #Chronic Atrial fibrillation c converted to sinus  overnight 8/27-8/28  #Non ischemic cardiomyopathy  #HF  tele  -  on Tikosyn loading  tele: AFib 60-70' s bpm with PVC (corrected 480ms)  Continue Tikosyn 250mcg po q12hrs now. Pt needs 6 doses in-pt loading.  Continue metoprolol 50mg po BID  Continue uninterrupted xarelto   GDMT for chronic HFrEF  - EKG for QTC 2 hrs after every tikosyn dose   - Monitor K/MAg  plan to discharge 8/30/24 , last dose tikosyn tonight of 8/29 /24    #HTN/NIDDM/LINUS/HLD  - resume home meds      dvt px: DOAC

## 2024-08-30 NOTE — DISCHARGE NOTE PROVIDER - CARE PROVIDERS DIRECT ADDRESSES
,DirectAddress_Unknown,[email or web address]

## 2024-08-30 NOTE — DISCHARGE NOTE PROVIDER - NSDCMRMEDTOKEN_GEN_ALL_CORE_FT
amLODIPine 5 mg oral tablet: 1 tab(s) orally once a day  aspirin 81 mg oral delayed release tablet: 1 tab(s) orally once a day  Entresto 49 mg-51 mg oral tablet: 1 tab(s) orally 2 times a day  furosemide 40 mg oral tablet: 1 tab(s) orally once a day before dinner  Lipitor 40 mg oral tablet: 1 tab(s) orally once a day (at bedtime)  meclizine 25 mg oral tablet: 1 tab(s) orally every 6 hours as needed for  dizziness  metFORMIN 500 mg oral tablet: 1 tab(s) orally 2 times a day  metoprolol tartrate 50 mg oral tablet: 1 tab(s) orally 2 times a day  spironolactone 25 mg oral tablet: 1 tab(s) orally once a day before dinner  Xarelto 20 mg oral tablet: 1 tab(s) orally once a day (in the evening)   amLODIPine 5 mg oral tablet: 1 tab(s) orally once a day  aspirin 81 mg oral delayed release tablet: 1 tab(s) orally once a day  dofetilide 250 mcg oral capsule: 1 cap(s) orally every 12 hours  Entresto 49 mg-51 mg oral tablet: 1 tab(s) orally 2 times a day  furosemide 40 mg oral tablet: 1 tab(s) orally once a day before dinner  Lipitor 40 mg oral tablet: 1 tab(s) orally once a day (at bedtime)  magnesium oxide 400 mg oral tablet: 1 tab(s) orally once a day (at bedtime)  meclizine 25 mg oral tablet: 1 tab(s) orally every 6 hours as needed for  dizziness  metFORMIN 500 mg oral tablet: 1 tab(s) orally 2 times a day  metoprolol tartrate 50 mg oral tablet: 1 tab(s) orally 2 times a day  spironolactone 25 mg oral tablet: 1 tab(s) orally once a day before dinner  Xarelto 20 mg oral tablet: 1 tab(s) orally once a day (in the evening)

## 2024-08-30 NOTE — DISCHARGE NOTE PROVIDER - ATTENDING DISCHARGE PHYSICAL EXAMINATION:
VITALS:  T(F): 98 (08-30-24 @ 08:03), Max: 98.4 (08-29-24 @ 20:45)  HR: 65 (08-30-24 @ 08:03) (65 - 65)  BP: 115/84 (08-30-24 @ 08:03) (105/71 - 115/84)  RR: 18 (08-30-24 @ 08:03) (18 - 18)  SpO2: 99% (08-30-24 @ 08:03) (98% - 99%)  Wt(kg): --    I&O's Summary      CAPILLARY BLOOD GLUCOSE          PHYSICAL EXAM:  Gen: No acute distress   HEENT:  pupils equal and reactive, EOMI,   NECK:   supple, no carotid bruits, No JVD  CV:  +S1, +S2, regular rate rhythm, no murmurs or rubs  RESP:   lungs clear to auscultation bilaterally, no wheezing, rales, rhonchi, good air entry bilaterally  GI:  abdomen soft, non-tender, non-distended, normal BS, no bruits, no abdominal masses, no palpable masses  MSK:   normal muscle tone, no atrophy, no rigidity, no contractions  EXT:  no clubbing, no cyanosis, no edema, no calf pain, swelling or erythema  VASCULAR:  pulses equal and symmetric in the upper and lower extremities  NEURO:  AAOX3, no focal neurological deficits, follows all commands, able to move extremities spontaneously  SKIN:  no ulcers, lesions or rashes

## 2024-09-05 ENCOUNTER — APPOINTMENT (OUTPATIENT)
Dept: ELECTROPHYSIOLOGY | Facility: CLINIC | Age: 54
End: 2024-09-05

## 2024-09-05 VITALS
HEART RATE: 77 BPM | SYSTOLIC BLOOD PRESSURE: 110 MMHG | OXYGEN SATURATION: 96 % | BODY MASS INDEX: 47.71 KG/M2 | DIASTOLIC BLOOD PRESSURE: 77 MMHG | WEIGHT: 304 LBS | HEIGHT: 67 IN | RESPIRATION RATE: 16 BRPM

## 2024-09-05 PROCEDURE — 93000 ELECTROCARDIOGRAM COMPLETE: CPT | Mod: 59

## 2024-09-05 PROCEDURE — 93283 PRGRMG EVAL IMPLANTABLE DFB: CPT

## 2024-09-05 RX ORDER — MULTIVIT-MIN/IRON/FOLIC ACID/K 18-600-40
400 CAPSULE ORAL
Refills: 0 | Status: ACTIVE | COMMUNITY

## 2024-09-06 DIAGNOSIS — Z79.4 LONG TERM (CURRENT) USE OF INSULIN: ICD-10-CM

## 2024-09-06 DIAGNOSIS — E66.01 MORBID (SEVERE) OBESITY DUE TO EXCESS CALORIES: ICD-10-CM

## 2024-09-06 DIAGNOSIS — Z87.74 PERSONAL HISTORY OF (CORRECTED) CONGENITAL MALFORMATIONS OF HEART AND CIRCULATORY SYSTEM: ICD-10-CM

## 2024-09-06 DIAGNOSIS — Z79.84 LONG TERM (CURRENT) USE OF ORAL HYPOGLYCEMIC DRUGS: ICD-10-CM

## 2024-09-06 DIAGNOSIS — Z95.810 PRESENCE OF AUTOMATIC (IMPLANTABLE) CARDIAC DEFIBRILLATOR: ICD-10-CM

## 2024-09-06 DIAGNOSIS — E78.5 HYPERLIPIDEMIA, UNSPECIFIED: ICD-10-CM

## 2024-09-06 DIAGNOSIS — Z79.82 LONG TERM (CURRENT) USE OF ASPIRIN: ICD-10-CM

## 2024-09-06 DIAGNOSIS — Z91.199 PATIENT'S NONCOMPLIANCE WITH OTHER MEDICAL TREATMENT AND REGIMEN DUE TO UNSPECIFIED REASON: ICD-10-CM

## 2024-09-06 DIAGNOSIS — Z79.01 LONG TERM (CURRENT) USE OF ANTICOAGULANTS: ICD-10-CM

## 2024-09-06 DIAGNOSIS — G47.33 OBSTRUCTIVE SLEEP APNEA (ADULT) (PEDIATRIC): ICD-10-CM

## 2024-09-06 DIAGNOSIS — I49.3 VENTRICULAR PREMATURE DEPOLARIZATION: ICD-10-CM

## 2024-09-06 DIAGNOSIS — I42.8 OTHER CARDIOMYOPATHIES: ICD-10-CM

## 2024-09-06 DIAGNOSIS — Z79.899 OTHER LONG TERM (CURRENT) DRUG THERAPY: ICD-10-CM

## 2024-09-06 DIAGNOSIS — I11.0 HYPERTENSIVE HEART DISEASE WITH HEART FAILURE: ICD-10-CM

## 2024-09-06 DIAGNOSIS — Z86.73 PERSONAL HISTORY OF TRANSIENT ISCHEMIC ATTACK (TIA), AND CEREBRAL INFARCTION WITHOUT RESIDUAL DEFICITS: ICD-10-CM

## 2024-09-06 DIAGNOSIS — Z82.49 FAMILY HISTORY OF ISCHEMIC HEART DISEASE AND OTHER DISEASES OF THE CIRCULATORY SYSTEM: ICD-10-CM

## 2024-09-06 DIAGNOSIS — I48.0 PAROXYSMAL ATRIAL FIBRILLATION: ICD-10-CM

## 2024-09-06 DIAGNOSIS — I50.22 CHRONIC SYSTOLIC (CONGESTIVE) HEART FAILURE: ICD-10-CM

## 2024-09-06 DIAGNOSIS — E11.9 TYPE 2 DIABETES MELLITUS WITHOUT COMPLICATIONS: ICD-10-CM

## 2024-10-31 ENCOUNTER — APPOINTMENT (OUTPATIENT)
Dept: ELECTROPHYSIOLOGY | Facility: CLINIC | Age: 54
End: 2024-10-31

## 2024-12-12 ENCOUNTER — APPOINTMENT (OUTPATIENT)
Dept: ELECTROPHYSIOLOGY | Facility: CLINIC | Age: 54
End: 2024-12-12
Payer: MEDICARE

## 2024-12-12 ENCOUNTER — NON-APPOINTMENT (OUTPATIENT)
Age: 54
End: 2024-12-12

## 2024-12-12 VITALS
OXYGEN SATURATION: 100 % | HEIGHT: 67 IN | HEART RATE: 82 BPM | BODY MASS INDEX: 48.18 KG/M2 | WEIGHT: 307 LBS | DIASTOLIC BLOOD PRESSURE: 76 MMHG | SYSTOLIC BLOOD PRESSURE: 114 MMHG

## 2024-12-12 PROCEDURE — 93283 PRGRMG EVAL IMPLANTABLE DFB: CPT

## 2025-02-13 NOTE — ED ADULT NURSE NOTE - CAS TRG GEN SKIN COLOR
Detail Level: Detailed Quality 130: Documentation Of Current Medications In The Medical Record: Current Medications Documented Quality 226: Preventive Care And Screening: Tobacco Use: Screening And Cessation Intervention: Patient screened for tobacco use and is an ex/non-smoker Normal for race

## 2025-03-13 ENCOUNTER — APPOINTMENT (OUTPATIENT)
Dept: ELECTROPHYSIOLOGY | Facility: CLINIC | Age: 55
End: 2025-03-13
Payer: MEDICARE

## 2025-03-13 ENCOUNTER — NON-APPOINTMENT (OUTPATIENT)
Age: 55
End: 2025-03-13

## 2025-03-13 VITALS
HEIGHT: 67 IN | BODY MASS INDEX: 48.18 KG/M2 | DIASTOLIC BLOOD PRESSURE: 78 MMHG | WEIGHT: 307 LBS | HEART RATE: 77 BPM | SYSTOLIC BLOOD PRESSURE: 117 MMHG | OXYGEN SATURATION: 99 %

## 2025-03-13 PROCEDURE — 93283 PRGRMG EVAL IMPLANTABLE DFB: CPT

## 2025-05-16 NOTE — ED PROVIDER NOTE - INTERPRETATION
Daily 10mg pending if you feel appropriate    pacemaker: good capture, regular rhythm and appropriate intervals.

## 2025-06-12 ENCOUNTER — NON-APPOINTMENT (OUTPATIENT)
Age: 55
End: 2025-06-12

## 2025-06-12 ENCOUNTER — APPOINTMENT (OUTPATIENT)
Dept: ELECTROPHYSIOLOGY | Facility: CLINIC | Age: 55
End: 2025-06-12
Payer: MEDICARE

## 2025-06-12 VITALS
HEART RATE: 88 BPM | BODY MASS INDEX: 48.65 KG/M2 | WEIGHT: 310 LBS | SYSTOLIC BLOOD PRESSURE: 93 MMHG | OXYGEN SATURATION: 96 % | HEIGHT: 67 IN | DIASTOLIC BLOOD PRESSURE: 61 MMHG

## 2025-06-12 PROCEDURE — 93283 PRGRMG EVAL IMPLANTABLE DFB: CPT

## 2025-06-12 RX ORDER — WARFARIN 6 MG/1
6 TABLET ORAL DAILY
Refills: 0 | Status: ACTIVE | COMMUNITY

## 2025-07-28 ENCOUNTER — NON-APPOINTMENT (OUTPATIENT)
Age: 55
End: 2025-07-28

## 2025-09-11 ENCOUNTER — APPOINTMENT (OUTPATIENT)
Dept: ELECTROPHYSIOLOGY | Facility: CLINIC | Age: 55
End: 2025-09-11

## 2025-09-11 VITALS
BODY MASS INDEX: 48.65 KG/M2 | WEIGHT: 310 LBS | HEIGHT: 67 IN | DIASTOLIC BLOOD PRESSURE: 70 MMHG | HEART RATE: 78 BPM | SYSTOLIC BLOOD PRESSURE: 97 MMHG | OXYGEN SATURATION: 100 %